# Patient Record
Sex: FEMALE | Race: WHITE | Employment: UNEMPLOYED | URBAN - METROPOLITAN AREA
[De-identification: names, ages, dates, MRNs, and addresses within clinical notes are randomized per-mention and may not be internally consistent; named-entity substitution may affect disease eponyms.]

---

## 2019-01-14 ENCOUNTER — HOSPITAL ENCOUNTER (INPATIENT)
Facility: CLINIC | Age: 39
LOS: 4 days | Discharge: HOME HEALTH | DRG: 919 | End: 2019-01-18
Attending: SURGERY | Admitting: SURGERY
Payer: COMMERCIAL

## 2019-01-14 ENCOUNTER — TRANSFERRED RECORDS (OUTPATIENT)
Dept: HEALTH INFORMATION MANAGEMENT | Facility: CLINIC | Age: 39
End: 2019-01-14

## 2019-01-14 ENCOUNTER — APPOINTMENT (OUTPATIENT)
Dept: MRI IMAGING | Facility: CLINIC | Age: 39
DRG: 919 | End: 2019-01-14
Attending: SURGERY
Payer: COMMERCIAL

## 2019-01-14 DIAGNOSIS — R52 PAIN: ICD-10-CM

## 2019-01-14 DIAGNOSIS — S36.13XA INJURY OF BILE DUCT, INITIAL ENCOUNTER: Primary | ICD-10-CM

## 2019-01-14 LAB
ABO + RH BLD: NORMAL
ABO + RH BLD: NORMAL
ALBUMIN SERPL-MCNC: 3.3 G/DL (ref 3.4–5)
ALP SERPL-CCNC: 95 U/L (ref 40–150)
ALT SERPL W P-5'-P-CCNC: 84 U/L (ref 0–50)
ANION GAP SERPL CALCULATED.3IONS-SCNC: 8 MMOL/L (ref 3–14)
AST SERPL W P-5'-P-CCNC: 74 U/L (ref 0–45)
BILIRUB SERPL-MCNC: 0.3 MG/DL (ref 0.2–1.3)
BLD GP AB SCN SERPL QL: NORMAL
BLOOD BANK CMNT PATIENT-IMP: NORMAL
BUN SERPL-MCNC: 12 MG/DL (ref 7–30)
CALCIUM SERPL-MCNC: 8.2 MG/DL (ref 8.5–10.1)
CHLORIDE SERPL-SCNC: 104 MMOL/L (ref 94–109)
CO2 SERPL-SCNC: 26 MMOL/L (ref 20–32)
CREAT SERPL-MCNC: 0.64 MG/DL (ref 0.52–1.04)
ERYTHROCYTE [DISTWIDTH] IN BLOOD BY AUTOMATED COUNT: 12.9 % (ref 10–15)
GFR SERPL CREATININE-BSD FRML MDRD: >90 ML/MIN/{1.73_M2}
GLUCOSE SERPL-MCNC: 110 MG/DL (ref 70–99)
HCT VFR BLD AUTO: 38.3 % (ref 35–47)
HGB BLD-MCNC: 12.5 G/DL (ref 11.7–15.7)
INR PPP: 0.94 (ref 0.86–1.14)
MAGNESIUM SERPL-MCNC: 1.8 MG/DL (ref 1.6–2.3)
MCH RBC QN AUTO: 28.7 PG (ref 26.5–33)
MCHC RBC AUTO-ENTMCNC: 32.6 G/DL (ref 31.5–36.5)
MCV RBC AUTO: 88 FL (ref 78–100)
PHOSPHATE SERPL-MCNC: 3.8 MG/DL (ref 2.5–4.5)
PLATELET # BLD AUTO: 261 10E9/L (ref 150–450)
POTASSIUM SERPL-SCNC: 3.9 MMOL/L (ref 3.4–5.3)
PROT SERPL-MCNC: 7.3 G/DL (ref 6.8–8.8)
RBC # BLD AUTO: 4.35 10E12/L (ref 3.8–5.2)
SODIUM SERPL-SCNC: 138 MMOL/L (ref 133–144)
SPECIMEN EXP DATE BLD: NORMAL
WBC # BLD AUTO: 14.3 10E9/L (ref 4–11)

## 2019-01-14 PROCEDURE — 25000128 H RX IP 250 OP 636: Performed by: SURGERY

## 2019-01-14 PROCEDURE — 85610 PROTHROMBIN TIME: CPT | Performed by: STUDENT IN AN ORGANIZED HEALTH CARE EDUCATION/TRAINING PROGRAM

## 2019-01-14 PROCEDURE — 83735 ASSAY OF MAGNESIUM: CPT | Performed by: STUDENT IN AN ORGANIZED HEALTH CARE EDUCATION/TRAINING PROGRAM

## 2019-01-14 PROCEDURE — 86901 BLOOD TYPING SEROLOGIC RH(D): CPT | Performed by: STUDENT IN AN ORGANIZED HEALTH CARE EDUCATION/TRAINING PROGRAM

## 2019-01-14 PROCEDURE — 25500064 ZZH RX 255 OP 636: Performed by: SURGERY

## 2019-01-14 PROCEDURE — 80053 COMPREHEN METABOLIC PANEL: CPT | Performed by: STUDENT IN AN ORGANIZED HEALTH CARE EDUCATION/TRAINING PROGRAM

## 2019-01-14 PROCEDURE — 85027 COMPLETE CBC AUTOMATED: CPT | Performed by: STUDENT IN AN ORGANIZED HEALTH CARE EDUCATION/TRAINING PROGRAM

## 2019-01-14 PROCEDURE — 86850 RBC ANTIBODY SCREEN: CPT | Performed by: STUDENT IN AN ORGANIZED HEALTH CARE EDUCATION/TRAINING PROGRAM

## 2019-01-14 PROCEDURE — 74183 MRI ABD W/O CNTR FLWD CNTR: CPT

## 2019-01-14 PROCEDURE — 84100 ASSAY OF PHOSPHORUS: CPT | Performed by: STUDENT IN AN ORGANIZED HEALTH CARE EDUCATION/TRAINING PROGRAM

## 2019-01-14 PROCEDURE — 25000132 ZZH RX MED GY IP 250 OP 250 PS 637: Performed by: SURGERY

## 2019-01-14 PROCEDURE — 36415 COLL VENOUS BLD VENIPUNCTURE: CPT | Performed by: STUDENT IN AN ORGANIZED HEALTH CARE EDUCATION/TRAINING PROGRAM

## 2019-01-14 PROCEDURE — 12000001 ZZH R&B MED SURG/OB UMMC

## 2019-01-14 PROCEDURE — 25000128 H RX IP 250 OP 636: Performed by: STUDENT IN AN ORGANIZED HEALTH CARE EDUCATION/TRAINING PROGRAM

## 2019-01-14 PROCEDURE — 86900 BLOOD TYPING SEROLOGIC ABO: CPT | Performed by: STUDENT IN AN ORGANIZED HEALTH CARE EDUCATION/TRAINING PROGRAM

## 2019-01-14 PROCEDURE — A9581 GADOXETATE DISODIUM INJ: HCPCS | Performed by: SURGERY

## 2019-01-14 RX ORDER — HYDROMORPHONE HYDROCHLORIDE 1 MG/ML
.3-.5 INJECTION, SOLUTION INTRAMUSCULAR; INTRAVENOUS; SUBCUTANEOUS
Status: DISCONTINUED | OUTPATIENT
Start: 2019-01-14 | End: 2019-01-18 | Stop reason: HOSPADM

## 2019-01-14 RX ORDER — NALOXONE HYDROCHLORIDE 0.4 MG/ML
.1-.4 INJECTION, SOLUTION INTRAMUSCULAR; INTRAVENOUS; SUBCUTANEOUS
Status: DISCONTINUED | OUTPATIENT
Start: 2019-01-14 | End: 2019-01-18 | Stop reason: HOSPADM

## 2019-01-14 RX ORDER — ONDANSETRON 2 MG/ML
4 INJECTION INTRAMUSCULAR; INTRAVENOUS EVERY 6 HOURS PRN
Status: DISCONTINUED | OUTPATIENT
Start: 2019-01-14 | End: 2019-01-18 | Stop reason: HOSPADM

## 2019-01-14 RX ORDER — SODIUM CHLORIDE, SODIUM LACTATE, POTASSIUM CHLORIDE, CALCIUM CHLORIDE 600; 310; 30; 20 MG/100ML; MG/100ML; MG/100ML; MG/100ML
1000 INJECTION, SOLUTION INTRAVENOUS CONTINUOUS
Status: DISCONTINUED | OUTPATIENT
Start: 2019-01-14 | End: 2019-01-17

## 2019-01-14 RX ORDER — ACETAMINOPHEN 325 MG/1
650 TABLET ORAL EVERY 4 HOURS PRN
Status: DISCONTINUED | OUTPATIENT
Start: 2019-01-14 | End: 2019-01-18 | Stop reason: HOSPADM

## 2019-01-14 RX ORDER — ONDANSETRON 4 MG/1
4 TABLET, ORALLY DISINTEGRATING ORAL EVERY 6 HOURS PRN
Status: DISCONTINUED | OUTPATIENT
Start: 2019-01-14 | End: 2019-01-18 | Stop reason: HOSPADM

## 2019-01-14 RX ORDER — LIDOCAINE 40 MG/G
CREAM TOPICAL
Status: DISCONTINUED | OUTPATIENT
Start: 2019-01-14 | End: 2019-01-18 | Stop reason: HOSPADM

## 2019-01-14 RX ADMIN — SODIUM CHLORIDE 100 ML: 9 INJECTION, SOLUTION INTRAVENOUS at 21:06

## 2019-01-14 RX ADMIN — HYDROMORPHONE HYDROCHLORIDE 0.3 MG: 1 INJECTION, SOLUTION INTRAMUSCULAR; INTRAVENOUS; SUBCUTANEOUS at 17:44

## 2019-01-14 RX ADMIN — ACETAMINOPHEN 650 MG: 325 TABLET, FILM COATED ORAL at 21:06

## 2019-01-14 RX ADMIN — SODIUM CHLORIDE, POTASSIUM CHLORIDE, SODIUM LACTATE AND CALCIUM CHLORIDE 1000 ML: 600; 310; 30; 20 INJECTION, SOLUTION INTRAVENOUS at 17:44

## 2019-01-14 RX ADMIN — HYDROMORPHONE HYDROCHLORIDE 0.5 MG: 1 INJECTION, SOLUTION INTRAMUSCULAR; INTRAVENOUS; SUBCUTANEOUS at 21:06

## 2019-01-14 RX ADMIN — GADOXETATE DISODIUM 10 ML: 181.43 INJECTION, SOLUTION INTRAVENOUS at 21:07

## 2019-01-14 ASSESSMENT — ACTIVITIES OF DAILY LIVING (ADL): ADLS_ACUITY_SCORE: 12

## 2019-01-14 NOTE — H&P
Surgical Oncology Admission History and Physical    Bryanna Taylor MRN# [id number]     Date of Admission: 2019    CC: concern for bile duct injury    HPI: 37 yo F with h/o abd pain x 6 years who underwent lap sheryl today at OSH, complicated by suspected bile duct injury.  Operative note describes significant inflammation and adherence to common vs right hepatic artery.  Concern for injury prompted cholangiogram, which showed no opacification of biliary tree.  Drain placed, gallbladder removed, transferred.    Past Medical History:    Symptomatic cholelithiasis  Cholestasis during pregnancy  Gestational diabetes  Obesity BMI 41  Hx H pylori     Past Surgical History:   section x 4    Allergies:  No known drug allergies    Medications:  Zolpidem 5 mg at bedtime prn  Ibuprofen  Iron    Social History:  Non-smoker  No etoh or drug use    Family History:  No bleeding/clotting disorders or difficulties with anesthesia    ROS:  10 point ROS reviewed and negative except as above.    Exam:  /89 (BP Location: Right arm)   Pulse 99   Temp 97.2  F (36.2  C) (Oral)   Resp 16   Wt 96.3 kg (212 lb 3.2 oz)   SpO2 100%   No acute distress, interactive  Nlb, on room air  Regular rate, regular rhythm peripheral pulses 2+  Obese; RUQ drain with bile tinged serosanguinous fluid; incisions c/d/i covered with dermabond  Extremities warm, well perfused  Alert, oriented    Labs: pending    Assessment: 38 year old female with suspected bile duct injury s/p lap sheryl    Plan:   -npo, ivf, pain control; ok for ice chips  -mrcp  -gi consult for ercp  -further interventions pending imaging findings     Discussed with Dr. Morillo.    Keyshawn Redmond MD  [email or web address]  [id number]    I saw this patient and reviewed pertinent history, labs and imaging. I spoke to outside surgical team. Has bile duct injury of uncertain extent. MRCP done last night is not revealing, although does show significant leak. GI consulted for ERCP to further assess  injury and consider stent placement. I explained to patient that some minor injuries can be treated with biliary stent and drainage, while more serious injuries require PTC and future reconstructive surgery. We will discuss further once we have better understanding of the exact nature of her injury. I spent 40 minutes on this case, 20 minutes face to face and 20 minutes on coordination of care.    Will also ask maternal fetal medicine to see - patient is breast feeding a one month old baby currently.

## 2019-01-14 NOTE — LETTER
[address] [facility]  [address] [address]  Phone: [phone]    January 18, 2019        Bryanna Taylor  [address] [facility] [address]          To whom it may concern:    Ms. Bryanna Taylor was hospitalized from 1/14/2019 to 1/18/2019. She underwent a procedure requiring placement of an abdominal drain. She will require help for her medical care at home. Please excuse her  [employer] from work for 2 weeks to assist her with these needs. Thank you for your time and help.        Please contact me for questions or concerns.      Sincerely,      Caren Graves MD  Pager: [phone]

## 2019-01-15 ENCOUNTER — ANESTHESIA (OUTPATIENT)
Dept: SURGERY | Facility: CLINIC | Age: 39
DRG: 919 | End: 2019-01-15
Payer: COMMERCIAL

## 2019-01-15 ENCOUNTER — OFFICE VISIT (OUTPATIENT)
Dept: INTERPRETER SERVICES | Facility: CLINIC | Age: 39
End: 2019-01-15

## 2019-01-15 ENCOUNTER — ANESTHESIA EVENT (OUTPATIENT)
Dept: SURGERY | Facility: CLINIC | Age: 39
DRG: 919 | End: 2019-01-15
Payer: COMMERCIAL

## 2019-01-15 ENCOUNTER — APPOINTMENT (OUTPATIENT)
Dept: GENERAL RADIOLOGY | Facility: CLINIC | Age: 39
DRG: 919 | End: 2019-01-15
Attending: SURGERY
Payer: COMMERCIAL

## 2019-01-15 LAB
ALBUMIN SERPL-MCNC: 2.9 G/DL (ref 3.4–5)
ALP SERPL-CCNC: 87 U/L (ref 40–150)
ALT SERPL W P-5'-P-CCNC: 62 U/L (ref 0–50)
ANION GAP SERPL CALCULATED.3IONS-SCNC: 7 MMOL/L (ref 3–14)
AST SERPL W P-5'-P-CCNC: 31 U/L (ref 0–45)
BILIRUB SERPL-MCNC: 0.6 MG/DL (ref 0.2–1.3)
BUN SERPL-MCNC: 9 MG/DL (ref 7–30)
CALCIUM SERPL-MCNC: 8.2 MG/DL (ref 8.5–10.1)
CHLORIDE SERPL-SCNC: 104 MMOL/L (ref 94–109)
CO2 SERPL-SCNC: 29 MMOL/L (ref 20–32)
CREAT SERPL-MCNC: 0.56 MG/DL (ref 0.52–1.04)
ERCP: NORMAL
ERYTHROCYTE [DISTWIDTH] IN BLOOD BY AUTOMATED COUNT: 13.1 % (ref 10–15)
GFR SERPL CREATININE-BSD FRML MDRD: >90 ML/MIN/{1.73_M2}
GLUCOSE SERPL-MCNC: 101 MG/DL (ref 70–99)
HCT VFR BLD AUTO: 36.1 % (ref 35–47)
HGB BLD-MCNC: 11.8 G/DL (ref 11.7–15.7)
MAGNESIUM SERPL-MCNC: 1.9 MG/DL (ref 1.6–2.3)
MCH RBC QN AUTO: 28.9 PG (ref 26.5–33)
MCHC RBC AUTO-ENTMCNC: 32.7 G/DL (ref 31.5–36.5)
MCV RBC AUTO: 89 FL (ref 78–100)
PHOSPHATE SERPL-MCNC: 3.6 MG/DL (ref 2.5–4.5)
PLATELET # BLD AUTO: 254 10E9/L (ref 150–450)
POTASSIUM SERPL-SCNC: 3.4 MMOL/L (ref 3.4–5.3)
PROT SERPL-MCNC: 6.9 G/DL (ref 6.8–8.8)
RADIOLOGIST FLAGS: ABNORMAL
RBC # BLD AUTO: 4.08 10E12/L (ref 3.8–5.2)
SODIUM SERPL-SCNC: 140 MMOL/L (ref 133–144)
WBC # BLD AUTO: 9.7 10E9/L (ref 4–11)

## 2019-01-15 PROCEDURE — 36000061 ZZH SURGERY LEVEL 3 W FLUORO 1ST 30 MIN - UMMC: Performed by: INTERNAL MEDICINE

## 2019-01-15 PROCEDURE — 85027 COMPLETE CBC AUTOMATED: CPT | Performed by: SURGERY

## 2019-01-15 PROCEDURE — T1013 SIGN LANG/ORAL INTERPRETER: HCPCS | Mod: U3

## 2019-01-15 PROCEDURE — 12000001 ZZH R&B MED SURG/OB UMMC

## 2019-01-15 PROCEDURE — 25000128 H RX IP 250 OP 636: Performed by: ANESTHESIOLOGY

## 2019-01-15 PROCEDURE — 25000132 ZZH RX MED GY IP 250 OP 250 PS 637: Performed by: SURGERY

## 2019-01-15 PROCEDURE — 25000125 ZZHC RX 250: Performed by: INTERNAL MEDICINE

## 2019-01-15 PROCEDURE — 37000009 ZZH ANESTHESIA TECHNICAL FEE, EACH ADDTL 15 MIN: Performed by: INTERNAL MEDICINE

## 2019-01-15 PROCEDURE — 36415 COLL VENOUS BLD VENIPUNCTURE: CPT | Performed by: SURGERY

## 2019-01-15 PROCEDURE — 71000016 ZZH RECOVERY PHASE 1 LEVEL 3 FIRST HR: Performed by: INTERNAL MEDICINE

## 2019-01-15 PROCEDURE — 80053 COMPREHEN METABOLIC PANEL: CPT | Performed by: SURGERY

## 2019-01-15 PROCEDURE — 25000125 ZZHC RX 250: Performed by: NURSE ANESTHETIST, CERTIFIED REGISTERED

## 2019-01-15 PROCEDURE — 83735 ASSAY OF MAGNESIUM: CPT | Performed by: SURGERY

## 2019-01-15 PROCEDURE — 36000059 ZZH SURGERY LEVEL 3 EA 15 ADDTL MIN UMMC: Performed by: INTERNAL MEDICINE

## 2019-01-15 PROCEDURE — 25500064 ZZH RX 255 OP 636: Performed by: INTERNAL MEDICINE

## 2019-01-15 PROCEDURE — 84100 ASSAY OF PHOSPHORUS: CPT | Performed by: SURGERY

## 2019-01-15 PROCEDURE — 40000170 ZZH STATISTIC PRE-PROCEDURE ASSESSMENT II: Performed by: INTERNAL MEDICINE

## 2019-01-15 PROCEDURE — 25000128 H RX IP 250 OP 636: Performed by: STUDENT IN AN ORGANIZED HEALTH CARE EDUCATION/TRAINING PROGRAM

## 2019-01-15 PROCEDURE — C1769 GUIDE WIRE: HCPCS | Performed by: INTERNAL MEDICINE

## 2019-01-15 PROCEDURE — 25000128 H RX IP 250 OP 636: Performed by: NURSE ANESTHETIST, CERTIFIED REGISTERED

## 2019-01-15 PROCEDURE — 0FJB8ZZ INSPECTION OF HEPATOBILIARY DUCT, VIA NATURAL OR ARTIFICIAL OPENING ENDOSCOPIC: ICD-10-PCS | Performed by: INTERNAL MEDICINE

## 2019-01-15 PROCEDURE — 37000008 ZZH ANESTHESIA TECHNICAL FEE, 1ST 30 MIN: Performed by: INTERNAL MEDICINE

## 2019-01-15 PROCEDURE — 40000279 XR SURGERY CARM FLUORO GREATER THAN 5 MIN W STILLS: Mod: TC

## 2019-01-15 PROCEDURE — 27210794 ZZH OR GENERAL SUPPLY STERILE: Performed by: INTERNAL MEDICINE

## 2019-01-15 PROCEDURE — BF101ZZ FLUOROSCOPY OF BILE DUCTS USING LOW OSMOLAR CONTRAST: ICD-10-PCS | Performed by: INTERNAL MEDICINE

## 2019-01-15 PROCEDURE — 25000128 H RX IP 250 OP 636: Performed by: SURGERY

## 2019-01-15 PROCEDURE — 25000566 ZZH SEVOFLURANE, EA 15 MIN: Performed by: INTERNAL MEDICINE

## 2019-01-15 PROCEDURE — C1726 CATH, BAL DIL, NON-VASCULAR: HCPCS | Performed by: INTERNAL MEDICINE

## 2019-01-15 RX ORDER — NALOXONE HYDROCHLORIDE 0.4 MG/ML
.1-.4 INJECTION, SOLUTION INTRAMUSCULAR; INTRAVENOUS; SUBCUTANEOUS
Status: DISCONTINUED | OUTPATIENT
Start: 2019-01-15 | End: 2019-01-16

## 2019-01-15 RX ORDER — NALOXONE HYDROCHLORIDE 0.4 MG/ML
.1-.4 INJECTION, SOLUTION INTRAMUSCULAR; INTRAVENOUS; SUBCUTANEOUS
Status: DISCONTINUED | OUTPATIENT
Start: 2019-01-15 | End: 2019-01-15

## 2019-01-15 RX ORDER — INDOMETHACIN 50 MG/1
SUPPOSITORY RECTAL PRN
Status: DISCONTINUED | OUTPATIENT
Start: 2019-01-15 | End: 2019-01-15 | Stop reason: HOSPADM

## 2019-01-15 RX ORDER — LIDOCAINE 40 MG/G
CREAM TOPICAL
Status: DISCONTINUED | OUTPATIENT
Start: 2019-01-15 | End: 2019-01-15 | Stop reason: HOSPADM

## 2019-01-15 RX ORDER — FENTANYL CITRATE 50 UG/ML
INJECTION, SOLUTION INTRAMUSCULAR; INTRAVENOUS PRN
Status: DISCONTINUED | OUTPATIENT
Start: 2019-01-15 | End: 2019-01-15

## 2019-01-15 RX ORDER — PROPOFOL 10 MG/ML
INJECTION, EMULSION INTRAVENOUS PRN
Status: DISCONTINUED | OUTPATIENT
Start: 2019-01-15 | End: 2019-01-15

## 2019-01-15 RX ORDER — ERTAPENEM 1 G/1
INJECTION, POWDER, LYOPHILIZED, FOR SOLUTION INTRAMUSCULAR; INTRAVENOUS PRN
Status: DISCONTINUED | OUTPATIENT
Start: 2019-01-15 | End: 2019-01-15

## 2019-01-15 RX ORDER — FLUMAZENIL 0.1 MG/ML
0.2 INJECTION, SOLUTION INTRAVENOUS
Status: ACTIVE | OUTPATIENT
Start: 2019-01-15 | End: 2019-01-16

## 2019-01-15 RX ORDER — LIDOCAINE 40 MG/G
CREAM TOPICAL
Status: CANCELLED | OUTPATIENT
Start: 2019-01-15

## 2019-01-15 RX ORDER — ONDANSETRON 2 MG/ML
4 INJECTION INTRAMUSCULAR; INTRAVENOUS EVERY 30 MIN PRN
Status: DISCONTINUED | OUTPATIENT
Start: 2019-01-15 | End: 2019-01-15 | Stop reason: HOSPADM

## 2019-01-15 RX ORDER — DEXAMETHASONE SODIUM PHOSPHATE 4 MG/ML
INJECTION, SOLUTION INTRA-ARTICULAR; INTRALESIONAL; INTRAMUSCULAR; INTRAVENOUS; SOFT TISSUE PRN
Status: DISCONTINUED | OUTPATIENT
Start: 2019-01-15 | End: 2019-01-15

## 2019-01-15 RX ORDER — FENTANYL CITRATE 50 UG/ML
25-50 INJECTION, SOLUTION INTRAMUSCULAR; INTRAVENOUS
Status: DISCONTINUED | OUTPATIENT
Start: 2019-01-15 | End: 2019-01-15 | Stop reason: HOSPADM

## 2019-01-15 RX ORDER — ONDANSETRON 2 MG/ML
INJECTION INTRAMUSCULAR; INTRAVENOUS PRN
Status: DISCONTINUED | OUTPATIENT
Start: 2019-01-15 | End: 2019-01-15

## 2019-01-15 RX ORDER — INDOMETHACIN 50 MG/1
100 SUPPOSITORY RECTAL
Status: CANCELLED | OUTPATIENT
Start: 2019-01-15

## 2019-01-15 RX ORDER — SODIUM CHLORIDE, SODIUM LACTATE, POTASSIUM CHLORIDE, CALCIUM CHLORIDE 600; 310; 30; 20 MG/100ML; MG/100ML; MG/100ML; MG/100ML
INJECTION, SOLUTION INTRAVENOUS CONTINUOUS
Status: DISCONTINUED | OUTPATIENT
Start: 2019-01-15 | End: 2019-01-15 | Stop reason: HOSPADM

## 2019-01-15 RX ORDER — ONDANSETRON 4 MG/1
4 TABLET, ORALLY DISINTEGRATING ORAL EVERY 30 MIN PRN
Status: DISCONTINUED | OUTPATIENT
Start: 2019-01-15 | End: 2019-01-15 | Stop reason: HOSPADM

## 2019-01-15 RX ORDER — HYDRALAZINE HYDROCHLORIDE 20 MG/ML
10 INJECTION INTRAMUSCULAR; INTRAVENOUS EVERY 6 HOURS PRN
Status: DISCONTINUED | OUTPATIENT
Start: 2019-01-15 | End: 2019-01-18 | Stop reason: HOSPADM

## 2019-01-15 RX ORDER — IOPAMIDOL 510 MG/ML
INJECTION, SOLUTION INTRAVASCULAR PRN
Status: DISCONTINUED | OUTPATIENT
Start: 2019-01-15 | End: 2019-01-15 | Stop reason: HOSPADM

## 2019-01-15 RX ORDER — SODIUM CHLORIDE, SODIUM LACTATE, POTASSIUM CHLORIDE, CALCIUM CHLORIDE 600; 310; 30; 20 MG/100ML; MG/100ML; MG/100ML; MG/100ML
INJECTION, SOLUTION INTRAVENOUS CONTINUOUS PRN
Status: DISCONTINUED | OUTPATIENT
Start: 2019-01-15 | End: 2019-01-15

## 2019-01-15 RX ADMIN — SODIUM CHLORIDE, POTASSIUM CHLORIDE, SODIUM LACTATE AND CALCIUM CHLORIDE: 600; 310; 30; 20 INJECTION, SOLUTION INTRAVENOUS at 19:08

## 2019-01-15 RX ADMIN — ACETAMINOPHEN 650 MG: 325 TABLET, FILM COATED ORAL at 08:35

## 2019-01-15 RX ADMIN — MIDAZOLAM 2 MG: 1 INJECTION INTRAMUSCULAR; INTRAVENOUS at 17:03

## 2019-01-15 RX ADMIN — ERTAPENEM SODIUM 1 G: 1 INJECTION, POWDER, LYOPHILIZED, FOR SOLUTION INTRAMUSCULAR; INTRAVENOUS at 17:35

## 2019-01-15 RX ADMIN — ONDANSETRON 4 MG: 2 INJECTION INTRAMUSCULAR; INTRAVENOUS at 17:57

## 2019-01-15 RX ADMIN — ROCURONIUM BROMIDE 50 MG: 10 INJECTION INTRAVENOUS at 17:17

## 2019-01-15 RX ADMIN — ACETAMINOPHEN 650 MG: 325 TABLET, FILM COATED ORAL at 12:34

## 2019-01-15 RX ADMIN — HYDROMORPHONE HYDROCHLORIDE 0.5 MG: 1 INJECTION, SOLUTION INTRAMUSCULAR; INTRAVENOUS; SUBCUTANEOUS at 08:35

## 2019-01-15 RX ADMIN — SUGAMMADEX 200 MG: 100 INJECTION, SOLUTION INTRAVENOUS at 18:00

## 2019-01-15 RX ADMIN — DEXAMETHASONE SODIUM PHOSPHATE 4 MG: 4 INJECTION, SOLUTION INTRA-ARTICULAR; INTRALESIONAL; INTRAMUSCULAR; INTRAVENOUS; SOFT TISSUE at 17:38

## 2019-01-15 RX ADMIN — PROPOFOL 120 MG: 10 INJECTION, EMULSION INTRAVENOUS at 17:17

## 2019-01-15 RX ADMIN — SODIUM CHLORIDE, POTASSIUM CHLORIDE, SODIUM LACTATE AND CALCIUM CHLORIDE: 600; 310; 30; 20 INJECTION, SOLUTION INTRAVENOUS at 17:02

## 2019-01-15 RX ADMIN — FENTANYL CITRATE 50 MCG: 50 INJECTION, SOLUTION INTRAMUSCULAR; INTRAVENOUS at 17:17

## 2019-01-15 RX ADMIN — SODIUM CHLORIDE, POTASSIUM CHLORIDE, SODIUM LACTATE AND CALCIUM CHLORIDE 1000 ML: 600; 310; 30; 20 INJECTION, SOLUTION INTRAVENOUS at 05:07

## 2019-01-15 RX ADMIN — HYDROMORPHONE HYDROCHLORIDE 0.5 MG: 1 INJECTION, SOLUTION INTRAMUSCULAR; INTRAVENOUS; SUBCUTANEOUS at 00:03

## 2019-01-15 ASSESSMENT — ENCOUNTER SYMPTOMS
DYSRHYTHMIAS: 0
SEIZURES: 0

## 2019-01-15 ASSESSMENT — ACTIVITIES OF DAILY LIVING (ADL)
ADLS_ACUITY_SCORE: 12
ADLS_ACUITY_SCORE: 13
ADLS_ACUITY_SCORE: 13
ADLS_ACUITY_SCORE: 12
ADLS_ACUITY_SCORE: 13

## 2019-01-15 ASSESSMENT — PAIN DESCRIPTION - DESCRIPTORS
DESCRIPTORS: ACHING
DESCRIPTORS: ACHING

## 2019-01-15 ASSESSMENT — LIFESTYLE VARIABLES: TOBACCO_USE: 0

## 2019-01-15 NOTE — PLAN OF CARE
Time:3882-0872   Reason for admission: POD #1 lap sheryl at OSH, c/b suspected common bile duct injury. Transferred from OSH to here for care.   VS: VSS on RA. Afebrile.   Activity: Up with assist x1, steady on feet. Calls appropriately.   Neuros: A&Ox4. Neuros intact. Upper sorbian speaking. C/o incisional pain managed with PRN PO Tylenol & PRN IV dilaudid.   Cardiac: WDL. Denies chest pain.   Respiratory: WDL. LS clear. Denies SOB. IS encouraged.   GI/: Voiding without difficulty. No BM on this shift, hypoactive BS, -gas. Denies nausea or vomiting.   Diet: NPO except ice chips and meds.   Skin: x4 lap sites closed with derma bond, no drainage noted, BRUNO. Scopolamine patch behind right ear.   Lines: Left PIV infusing NS at 100 mL/hr. Right LOREN with bile OP.   Plan: GI consult. Keep NPO. Pain management.   Will continue to monitor & follow POC.

## 2019-01-15 NOTE — PLAN OF CARE
VSS. Pain managed with tylenol and IV dilaudid. Went to OR for ERCP at 1400. Pumping and saving right now, per pharmacy okay to feed baby with IV dilaudid due to low dose, but Ilia said to wait for gynecology consult. Lap sites intact with dermabond. LOREN with bilious output. Voiding spontaneously. Indonesian speaking. Continue current POC.

## 2019-01-15 NOTE — PROGRESS NOTES
Admitted with bile duct injury during lap sheryl  MRCP unclear as to site of injury but non-visualization of distal duct on prelim reading  GI to see today for ERCP planning  Depending on site of injury, will plan further treatment options - either biliary stent and drainage, or PTC and future surgical correction in 6-8 weeks.  Discussed with patient in presence of .

## 2019-01-15 NOTE — ANESTHESIA PREPROCEDURE EVALUATION
Anesthesia Pre-Procedure Evaluation    Patient: Bryanna Taylor   MRN:     [id number] Gender:   female   Age:    38 year old :      1980        Preoperative Diagnosis: Bile Leak   Procedure(s):  ENDOSCOPIC RETROGRADE CHOLANGIOPANCREATOGRAM     No past medical history on file.   History reviewed. No pertinent surgical history.       Anesthesia Evaluation     . Pt has had prior anesthetic.     No history of anesthetic complications          ROS/MED HX    ENT/Pulmonary:  - neg pulmonary ROS    (-) tobacco use and asthma   Neurologic:  - neg neurologic ROS    (-) seizures   Cardiovascular:  - neg cardiovascular ROS      (-) arrhythmias and irregular heartbeat/palpitations   METS/Exercise Tolerance:  4 - Raking leaves, gardening   Hematologic:         Musculoskeletal:         GI/Hepatic:     (+) cholecystitis/cholelithiasis (c/b bile duct injury),      (-) GERD   Renal/Genitourinary:  - ROS Renal section negative    (-) renal disease   Endo:         Psychiatric:         Infectious Disease:         Malignancy:         Other:                         PHYSICAL EXAM:   Mental Status/Neuro:    Airway: Facies: Feasible  Mallampati: II  Mouth/Opening: Full  TM distance: > 6 cm  Neck ROM: Full   Respiratory: Auscultation: CTAB     Resp. Rate: Normal     Resp. Effort: Normal      CV: Rhythm: Regular  Heart: Normal Sounds  Pulses: Normal   Comments:      Dental: Normal                  Lab Results   Component Value Date    WBC 9.7 01/15/2019    HGB 11.8 01/15/2019    HCT 36.1 01/15/2019     01/15/2019     01/15/2019    POTASSIUM 3.4 01/15/2019    CHLORIDE 104 01/15/2019    CO2 29 01/15/2019    BUN 9 01/15/2019    CR 0.56 01/15/2019     (H) 01/15/2019    AZEB 8.2 (L) 01/15/2019    PHOS 3.6 01/15/2019    MAG 1.9 01/15/2019    ALBUMIN 2.9 (L) 01/15/2019    PROTTOTAL 6.9 01/15/2019    ALT 62 (H) 01/15/2019    AST 31 01/15/2019    ALKPHOS 87 01/15/2019    BILITOTAL 0.6 01/15/2019    INR 0.94 2019        Preop Vitals  BP Readings from Last 3 Encounters:   01/15/19 145/79    Pulse Readings from Last 3 Encounters:   01/15/19 70      Resp Readings from Last 3 Encounters:   01/15/19 16    SpO2 Readings from Last 3 Encounters:   01/15/19 96%      Temp Readings from Last 1 Encounters:   01/15/19 36.7  C (98  F) (Oral)    Ht Readings from Last 1 Encounters:   No data found for Ht      Wt Readings from Last 1 Encounters:   01/14/19 96.3 kg (212 lb 3.2 oz)    There is no height or weight on file to calculate BMI.     LDA:  Peripheral IV 01/14/19 Left Hand (Active)   Site Assessment WDL 1/15/2019 11:00 AM   Line Status Saline locked 1/15/2019 11:00 AM   Phlebitis Scale 0-->no symptoms 1/15/2019 11:00 AM   Infiltration Scale 0 1/15/2019 11:00 AM   Infiltration Site Treatment Method  None 1/15/2019 11:00 AM   Extravasation? No 1/15/2019 11:00 AM   Number of days: 1       Closed/Suction Drain 1 Right Abdomen Bulb (Active)   Site Description WD 1/15/2019  8:45 AM   Dressing Status Normal: Clean, Dry & Intact 1/15/2019  8:45 AM   Drainage Appearance Bile 1/15/2019 12:00 PM   Status To bulb suction 1/15/2019 12:00 PM   Output (ml) 15 ml 1/15/2019 12:00 PM   Number of days: 1            Assessment:   ASA SCORE: 1       Documentation: H&P complete; Consents complete   Proceeding: Proceed without further delay     Plan:   Anes. Type:  General                          PONV Management:Adult Risk Factors: Female     CONSENT: Direct conversation   Plan and risks discussed with: Patient          Comments for Plan/Consent:  GETA. PIVx1. Standard ASA monitors. IV opioids. PACU postop    Risks and benefits of anesthetic discussed with patient including sore throat, voice hoarseness, injury to vocal cords, throat, mouth, teeth, tongue, and lips from intubation; nausea/vomiting.    Presented opportunity to answer patient and family questions. Questions addressed.                           Lionel Roberts MD

## 2019-01-15 NOTE — PROGRESS NOTES
Brief Consult Note    Patient is a 39 yo approximately 1 month postpartum and breastfeeding admitted from an outside hospital POD#1 s/p laparoscopic cholecystectomy at an outside hospital that was complicated by a suspected bile duct injury.  Patient is undergoing ERCP with general anesthesia and had questions regarding breastfeeding.    Would recommended that the patient pump and dump her breastmilk for 24 hours after undergoing general anesthesia.  Spoke with team regarding postoperative medications - uncertain now but will be dictated based on findings in surgery (antibiotics).  If there are further questions regarding postop medications feel free to page if you have additional concerns.    Heydi Dean MD   OB/GYN PGY4    GYN Pager [phone]  After hours pager [phone]

## 2019-01-15 NOTE — PLAN OF CARE
/81 (BP Location: Right arm)   Pulse 89   Temp 99.2  F (37.3  C) (Oral)   Resp 16   Wt 96.3 kg (212 lb 3.2 oz)   SpO2 99%     Time: 6561-8992.   Reason for admission: POD #0 lap sheryl at OSH, c/b suspected common bile duct injury. Transferred from OSH to here for care.   VS: VSS on RA with O2 sats in high 90s. Afebrile.   Activity: Up with assist x1, steady on feet. Calls appropriately.   Neuros: A&Ox4. Neuros intact. Yoruba speaking. C/o incisional pain managed with PRN PO Tylenol & PRN IV dilaudid.   Cardiac: WDL. HR stable in 80s. Denies chest pain.   Respiratory: WDL. LS clear. Denies SOB. IS encouraged.   GI/: Voiding without difficulty. No BM on this shift, hypoactive BS, -gas. Denies nausea or vomiting.   Diet: NPO except ice chips and meds.   Skin: x4 lap sites closed with derma bond, no drainage noted, BRUNO. Scopolamine patch behind right ear.   Lines: Left PIV infusing NS at 100 mL/hr. Right LOREN with bile OP.   Labs: WBC 14.3, ALT 84, AST 74. MRI completed.   New changes this shift: Admitted from OSH for common bile duct injury. MRI completed on this shift. Admissions completed with .   Plan: GI consult. Keep NPO. Pain management.   Will continue to monitor & follow POC.

## 2019-01-15 NOTE — PROGRESS NOTES
Surgical Oncology Progress Note    Bryanna Taylor  [id number]    No acute overnight events. Afebrile. Doing well on room air. Pain control adequate. No nausea or emesis. Up with assistance. Voiding independently.     Temp:  [97.2  F (36.2  C)-99.2  F (37.3  C)] 97.4  F (36.3  C)  Pulse:  [74-99] 74  Resp:  [14-18] 14  BP: (115-165)/(69-91) 115/69  SpO2:  [96 %-100 %] 96 %    Intake/Output Summary (Last 24 hours) at 1/15/2019 0631  Last data filed at 1/15/2019 0507  Gross per 24 hour   Intake 526.67 ml   Output 1810 ml   Net -1283.33 ml     NAD, resting comfortably  non-labored breathing on room air  soft, appropriately tender, non-distended  incisions CDI without erythema/drainage  LOREN drain with bilious output   warm and well perfused   alert, oriented    Yesterday: WBC 14.3, Tbili 0.3, AST 74, ALT 84, Cr 0.64  Labs pending today    38 year old female POD 1 s/p lap sheryl for symptomatic cholelithiasis complicated by likely bile leak, controlled with LOREN drain. MRCP confirms suspicion for bile leak, potentially of the CBD at the level of cystic duct.     GI consult to consider ERCP   LOREN drain to bulb suction  Follow up AM labs, pending  NPO, maintenance IV fluids  PO and IV pain medications  Ambulate QID, SCDs in bed    Seen with chief who will discuss with staff.    Caren Graves MD  General Surgery PGY-3

## 2019-01-16 ENCOUNTER — ANESTHESIA EVENT (OUTPATIENT)
Dept: SURGERY | Facility: CLINIC | Age: 39
DRG: 919 | End: 2019-01-16
Payer: COMMERCIAL

## 2019-01-16 ENCOUNTER — APPOINTMENT (OUTPATIENT)
Dept: INTERVENTIONAL RADIOLOGY/VASCULAR | Facility: CLINIC | Age: 39
DRG: 919 | End: 2019-01-16
Attending: SURGERY
Payer: COMMERCIAL

## 2019-01-16 ENCOUNTER — OFFICE VISIT (OUTPATIENT)
Dept: INTERPRETER SERVICES | Facility: CLINIC | Age: 39
End: 2019-01-16

## 2019-01-16 ENCOUNTER — ANESTHESIA (OUTPATIENT)
Dept: SURGERY | Facility: CLINIC | Age: 39
DRG: 919 | End: 2019-01-16
Payer: COMMERCIAL

## 2019-01-16 ENCOUNTER — OFFICE VISIT (OUTPATIENT)
Dept: INTERPRETER SERVICES | Facility: CLINIC | Age: 39
End: 2019-01-16
Payer: COMMERCIAL

## 2019-01-16 LAB
GLUCOSE BLDC GLUCOMTR-MCNC: 91 MG/DL (ref 70–99)
HCG SERPL QL: NEGATIVE

## 2019-01-16 PROCEDURE — 25000128 H RX IP 250 OP 636: Performed by: NURSE ANESTHETIST, CERTIFIED REGISTERED

## 2019-01-16 PROCEDURE — 37000008 ZZH ANESTHESIA TECHNICAL FEE, 1ST 30 MIN

## 2019-01-16 PROCEDURE — 25000128 H RX IP 250 OP 636: Performed by: SURGERY

## 2019-01-16 PROCEDURE — C1769 GUIDE WIRE: HCPCS

## 2019-01-16 PROCEDURE — 25500064 ZZH RX 255 OP 636: Performed by: RADIOLOGY

## 2019-01-16 PROCEDURE — 27210905 ZZH KIT CR7

## 2019-01-16 PROCEDURE — 40000170 ZZH STATISTIC PRE-PROCEDURE ASSESSMENT II

## 2019-01-16 PROCEDURE — 36481 INSERTION OF CATHETER VEIN: CPT

## 2019-01-16 PROCEDURE — 84703 CHORIONIC GONADOTROPIN ASSAY: CPT | Performed by: RADIOLOGY

## 2019-01-16 PROCEDURE — T1013 SIGN LANG/ORAL INTERPRETER: HCPCS | Mod: U3

## 2019-01-16 PROCEDURE — 25000128 H RX IP 250 OP 636: Performed by: RADIOLOGY

## 2019-01-16 PROCEDURE — 71000014 ZZH RECOVERY PHASE 1 LEVEL 2 FIRST HR

## 2019-01-16 PROCEDURE — 25000132 ZZH RX MED GY IP 250 OP 250 PS 637: Performed by: SURGERY

## 2019-01-16 PROCEDURE — 25000566 ZZH SEVOFLURANE, EA 15 MIN

## 2019-01-16 PROCEDURE — 47532 INJECTION FOR CHOLANGIOGRAM: CPT

## 2019-01-16 PROCEDURE — 37000009 ZZH ANESTHESIA TECHNICAL FEE, EACH ADDTL 15 MIN

## 2019-01-16 PROCEDURE — 25000132 ZZH RX MED GY IP 250 OP 250 PS 637: Performed by: ANESTHESIOLOGY

## 2019-01-16 PROCEDURE — 27210912 ZZH NEEDLE CR8

## 2019-01-16 PROCEDURE — 25000125 ZZHC RX 250: Performed by: NURSE ANESTHETIST, CERTIFIED REGISTERED

## 2019-01-16 PROCEDURE — 25000128 H RX IP 250 OP 636: Performed by: STUDENT IN AN ORGANIZED HEALTH CARE EDUCATION/TRAINING PROGRAM

## 2019-01-16 PROCEDURE — BF101ZZ FLUOROSCOPY OF BILE DUCTS USING LOW OSMOLAR CONTRAST: ICD-10-PCS | Performed by: RADIOLOGY

## 2019-01-16 PROCEDURE — 99231 SBSQ HOSP IP/OBS SF/LOW 25: CPT | Performed by: PHYSICIAN ASSISTANT

## 2019-01-16 PROCEDURE — 25000128 H RX IP 250 OP 636: Performed by: ANESTHESIOLOGY

## 2019-01-16 PROCEDURE — 27210907 ZZH KIT CR9

## 2019-01-16 PROCEDURE — 36415 COLL VENOUS BLD VENIPUNCTURE: CPT | Performed by: RADIOLOGY

## 2019-01-16 PROCEDURE — 00000146 ZZHCL STATISTIC GLUCOSE BY METER IP

## 2019-01-16 PROCEDURE — 12000001 ZZH R&B MED SURG/OB UMMC

## 2019-01-16 PROCEDURE — 27210732 ZZH ACCESSORY CR1

## 2019-01-16 RX ORDER — OXYCODONE HYDROCHLORIDE 5 MG/1
5 TABLET ORAL EVERY 4 HOURS PRN
Status: DISCONTINUED | OUTPATIENT
Start: 2019-01-16 | End: 2019-01-17

## 2019-01-16 RX ORDER — SODIUM CHLORIDE, SODIUM LACTATE, POTASSIUM CHLORIDE, CALCIUM CHLORIDE 600; 310; 30; 20 MG/100ML; MG/100ML; MG/100ML; MG/100ML
INJECTION, SOLUTION INTRAVENOUS CONTINUOUS
Status: DISCONTINUED | OUTPATIENT
Start: 2019-01-16 | End: 2019-01-16 | Stop reason: HOSPADM

## 2019-01-16 RX ORDER — GABAPENTIN 300 MG/1
300 CAPSULE ORAL ONCE
Status: COMPLETED | OUTPATIENT
Start: 2019-01-16 | End: 2019-01-16

## 2019-01-16 RX ORDER — ACETAMINOPHEN 325 MG/1
975 TABLET ORAL ONCE
Status: COMPLETED | OUTPATIENT
Start: 2019-01-16 | End: 2019-01-16

## 2019-01-16 RX ORDER — MEPERIDINE HYDROCHLORIDE 25 MG/ML
12.5 INJECTION INTRAMUSCULAR; INTRAVENOUS; SUBCUTANEOUS EVERY 5 MIN PRN
Status: DISCONTINUED | OUTPATIENT
Start: 2019-01-16 | End: 2019-01-16 | Stop reason: HOSPADM

## 2019-01-16 RX ORDER — NICOTINE POLACRILEX 4 MG
15-30 LOZENGE BUCCAL
Status: DISCONTINUED | OUTPATIENT
Start: 2019-01-16 | End: 2019-01-16 | Stop reason: HOSPADM

## 2019-01-16 RX ORDER — NICOTINE POLACRILEX 4 MG
15-30 LOZENGE BUCCAL
Status: DISCONTINUED | OUTPATIENT
Start: 2019-01-16 | End: 2019-01-18 | Stop reason: HOSPADM

## 2019-01-16 RX ORDER — DEXAMETHASONE SODIUM PHOSPHATE 4 MG/ML
INJECTION, SOLUTION INTRA-ARTICULAR; INTRALESIONAL; INTRAMUSCULAR; INTRAVENOUS; SOFT TISSUE PRN
Status: DISCONTINUED | OUTPATIENT
Start: 2019-01-16 | End: 2019-01-16

## 2019-01-16 RX ORDER — FENTANYL CITRATE 50 UG/ML
25-50 INJECTION, SOLUTION INTRAMUSCULAR; INTRAVENOUS
Status: DISCONTINUED | OUTPATIENT
Start: 2019-01-16 | End: 2019-01-16 | Stop reason: HOSPADM

## 2019-01-16 RX ORDER — LIDOCAINE HYDROCHLORIDE 20 MG/ML
INJECTION, SOLUTION INFILTRATION; PERINEURAL PRN
Status: DISCONTINUED | OUTPATIENT
Start: 2019-01-16 | End: 2019-01-16

## 2019-01-16 RX ORDER — DEXTROSE MONOHYDRATE 25 G/50ML
25-50 INJECTION, SOLUTION INTRAVENOUS
Status: DISCONTINUED | OUTPATIENT
Start: 2019-01-16 | End: 2019-01-16 | Stop reason: HOSPADM

## 2019-01-16 RX ORDER — HYDROMORPHONE HYDROCHLORIDE 1 MG/ML
.3-.5 INJECTION, SOLUTION INTRAMUSCULAR; INTRAVENOUS; SUBCUTANEOUS EVERY 5 MIN PRN
Status: DISCONTINUED | OUTPATIENT
Start: 2019-01-16 | End: 2019-01-16 | Stop reason: HOSPADM

## 2019-01-16 RX ORDER — IODIXANOL 320 MG/ML
100 INJECTION, SOLUTION INTRAVASCULAR ONCE
Status: COMPLETED | OUTPATIENT
Start: 2019-01-16 | End: 2019-01-16

## 2019-01-16 RX ORDER — NALOXONE HYDROCHLORIDE 0.4 MG/ML
.1-.4 INJECTION, SOLUTION INTRAMUSCULAR; INTRAVENOUS; SUBCUTANEOUS
Status: DISCONTINUED | OUTPATIENT
Start: 2019-01-16 | End: 2019-01-16

## 2019-01-16 RX ORDER — ONDANSETRON 4 MG/1
4 TABLET, ORALLY DISINTEGRATING ORAL EVERY 30 MIN PRN
Status: DISCONTINUED | OUTPATIENT
Start: 2019-01-16 | End: 2019-01-16 | Stop reason: HOSPADM

## 2019-01-16 RX ORDER — ESMOLOL HYDROCHLORIDE 10 MG/ML
INJECTION INTRAVENOUS PRN
Status: DISCONTINUED | OUTPATIENT
Start: 2019-01-16 | End: 2019-01-16

## 2019-01-16 RX ORDER — ONDANSETRON 2 MG/ML
4 INJECTION INTRAMUSCULAR; INTRAVENOUS EVERY 30 MIN PRN
Status: DISCONTINUED | OUTPATIENT
Start: 2019-01-16 | End: 2019-01-16 | Stop reason: HOSPADM

## 2019-01-16 RX ORDER — AMPICILLIN AND SULBACTAM 2; 1 G/1; G/1
3 INJECTION, POWDER, FOR SOLUTION INTRAMUSCULAR; INTRAVENOUS
Status: COMPLETED | OUTPATIENT
Start: 2019-01-16 | End: 2019-01-16

## 2019-01-16 RX ORDER — PROPOFOL 10 MG/ML
INJECTION, EMULSION INTRAVENOUS PRN
Status: DISCONTINUED | OUTPATIENT
Start: 2019-01-16 | End: 2019-01-16

## 2019-01-16 RX ORDER — DEXTROSE MONOHYDRATE 25 G/50ML
25-50 INJECTION, SOLUTION INTRAVENOUS
Status: DISCONTINUED | OUTPATIENT
Start: 2019-01-16 | End: 2019-01-18 | Stop reason: HOSPADM

## 2019-01-16 RX ORDER — LABETALOL HYDROCHLORIDE 5 MG/ML
10 INJECTION, SOLUTION INTRAVENOUS
Status: DISCONTINUED | OUTPATIENT
Start: 2019-01-16 | End: 2019-01-16 | Stop reason: HOSPADM

## 2019-01-16 RX ORDER — FENTANYL CITRATE 50 UG/ML
INJECTION, SOLUTION INTRAMUSCULAR; INTRAVENOUS PRN
Status: DISCONTINUED | OUTPATIENT
Start: 2019-01-16 | End: 2019-01-16

## 2019-01-16 RX ADMIN — LIDOCAINE HYDROCHLORIDE 100 MG: 20 INJECTION, SOLUTION INFILTRATION; PERINEURAL at 14:43

## 2019-01-16 RX ADMIN — PROPOFOL 200 MG: 10 INJECTION, EMULSION INTRAVENOUS at 14:45

## 2019-01-16 RX ADMIN — SODIUM CHLORIDE, POTASSIUM CHLORIDE, SODIUM LACTATE AND CALCIUM CHLORIDE: 600; 310; 30; 20 INJECTION, SOLUTION INTRAVENOUS at 14:26

## 2019-01-16 RX ADMIN — FENTANYL CITRATE 50 MCG: 50 INJECTION, SOLUTION INTRAMUSCULAR; INTRAVENOUS at 17:55

## 2019-01-16 RX ADMIN — ACETAMINOPHEN 650 MG: 325 TABLET, FILM COATED ORAL at 08:55

## 2019-01-16 RX ADMIN — HYDROMORPHONE HYDROCHLORIDE 0.3 MG: 1 INJECTION, SOLUTION INTRAMUSCULAR; INTRAVENOUS; SUBCUTANEOUS at 05:33

## 2019-01-16 RX ADMIN — IODIXANOL 60 ML: 320 INJECTION, SOLUTION INTRAVASCULAR at 17:48

## 2019-01-16 RX ADMIN — SODIUM CHLORIDE, POTASSIUM CHLORIDE, SODIUM LACTATE AND CALCIUM CHLORIDE: 600; 310; 30; 20 INJECTION, SOLUTION INTRAVENOUS at 17:45

## 2019-01-16 RX ADMIN — GABAPENTIN 300 MG: 300 CAPSULE ORAL at 14:18

## 2019-01-16 RX ADMIN — FENTANYL CITRATE 50 MCG: 50 INJECTION, SOLUTION INTRAMUSCULAR; INTRAVENOUS at 15:50

## 2019-01-16 RX ADMIN — ONDANSETRON 4 MG: 2 INJECTION INTRAMUSCULAR; INTRAVENOUS at 15:03

## 2019-01-16 RX ADMIN — FENTANYL CITRATE 50 MCG: 50 INJECTION, SOLUTION INTRAMUSCULAR; INTRAVENOUS at 15:14

## 2019-01-16 RX ADMIN — FENTANYL CITRATE 50 MCG: 50 INJECTION, SOLUTION INTRAMUSCULAR; INTRAVENOUS at 17:29

## 2019-01-16 RX ADMIN — SODIUM CHLORIDE, POTASSIUM CHLORIDE, SODIUM LACTATE AND CALCIUM CHLORIDE 1000 ML: 600; 310; 30; 20 INJECTION, SOLUTION INTRAVENOUS at 05:31

## 2019-01-16 RX ADMIN — AMPICILLIN SODIUM AND SULBACTAM SODIUM 3 G: 2; 1 INJECTION, POWDER, FOR SOLUTION INTRAMUSCULAR; INTRAVENOUS at 14:37

## 2019-01-16 RX ADMIN — FENTANYL CITRATE 50 MCG: 50 INJECTION, SOLUTION INTRAMUSCULAR; INTRAVENOUS at 16:43

## 2019-01-16 RX ADMIN — ROCURONIUM BROMIDE 50 MG: 10 INJECTION INTRAVENOUS at 14:45

## 2019-01-16 RX ADMIN — ACETAMINOPHEN 975 MG: 325 TABLET, FILM COATED ORAL at 14:19

## 2019-01-16 RX ADMIN — FENTANYL CITRATE 50 MCG: 50 INJECTION, SOLUTION INTRAMUSCULAR; INTRAVENOUS at 14:43

## 2019-01-16 RX ADMIN — ESMOLOL HYDROCHLORIDE 30 MG: 10 INJECTION, SOLUTION INTRAVENOUS at 17:54

## 2019-01-16 RX ADMIN — DEXAMETHASONE SODIUM PHOSPHATE 6 MG: 4 INJECTION, SOLUTION INTRA-ARTICULAR; INTRALESIONAL; INTRAMUSCULAR; INTRAVENOUS; SOFT TISSUE at 15:03

## 2019-01-16 RX ADMIN — SUGAMMADEX 200 MG: 100 INJECTION, SOLUTION INTRAVENOUS at 17:43

## 2019-01-16 ASSESSMENT — ENCOUNTER SYMPTOMS
DYSRHYTHMIAS: 0
SEIZURES: 0

## 2019-01-16 ASSESSMENT — ACTIVITIES OF DAILY LIVING (ADL)
ADLS_ACUITY_SCORE: 13
ADLS_ACUITY_SCORE: 12

## 2019-01-16 ASSESSMENT — LIFESTYLE VARIABLES: TOBACCO_USE: 0

## 2019-01-16 ASSESSMENT — PAIN DESCRIPTION - DESCRIPTORS: DESCRIPTORS: ACHING

## 2019-01-16 NOTE — PLAN OF CARE
[id number] Pt arrived from PACU at 2005. Healing abdominal incision with dermabond. Denies pain and nausea. LOREN with bilious output. PIV infusing LR at 100 ml/hr. Voids spontaneously, has not voided since arrival to floor from surgery. On clear liquid diet, NPO at 0000 for possible IR procedure tomorrow.  Up with assist x1. VSS on 2 L O2 NC. Lithuanian speaking, bedside  used after arrival to .

## 2019-01-16 NOTE — PROGRESS NOTES
Documentation of Face to Face and Certification for Home Health Services    I certify that patient: Bryanna Taylor is under my care and that I, or a nurse practitioner or physician's assistant working with me, had a face-to-face encounter that meets the physician face-to-face encounter requirements with this patient on: January 16, 2019.    This encounter with the patient was in whole, or in part, for the following medical condition, which is the primary reason for home health care: Bile duct injury during a Lap Choley surgical procedure at [facility] resulting in a transfer to the [facility] for repair.    I certify that, based on my findings, the following services are medically necessary home health services: Skilled RN cares in home setting to assist with multiple drain cares including recording of drain outputs and drain cares per MD orders and prescribed lab draws.    My clinical findings support the need for the above services because: patient is overwhelmed with hospitalization in two [facility] and will benefit form ongoing skilled education for prescribed drain placements to assist with healing.      Further, I certify that my clinical findings support that this patient is homebound deconditioned from multiple hospitalizations.     Based on the above findings. I certify that this patient is confined to the home and needs intermittent skilled nursing.      The patient is under my care, and I have initiated the establishment of the plan of care.  This patient will be followed by a physician who will periodically review the plan of care.  Physician/Provider to provide follow up care: No Ref-Primary, Physician/Primary Care MD to be determined prior to discharge from the acute care hospital setting.  The discharging MD team will sign home care orders until primary MD care is established.    Attending hospital physician (the Medicare certified [facility] provider): Dr. Michael Flanagan  MARGOT Morillo.Physician Signature: See electronic signature associated with these discharge orders.    Date: 1/16/2019

## 2019-01-16 NOTE — PROGRESS NOTES
Surgical Oncology Progress Note    Interval History:  No acute events overnight. Afebrile. Pain controlled.     Physical Exam:   Temp:  [96.8  F (36  C)-99.7  F (37.6  C)] 96.8  F (36  C)  Pulse:  [65-80] 73  Heart Rate:  [64-76] 67  Resp:  [14-24] 20  BP: (112-135)/(6-89) 120/68  SpO2:  [95 %-100 %] 97 %  General: Alert, oriented, appears comfortable, NAD.  Respiratory: breathing non labored  Abdomen: Abdomen is soft, non-tender, non-distended. Drain with bilious output.     Data:   All laboratory and imaging data in the past 24 hours reviewed  I/O last 3 completed shifts:  In: 3086.67 [P.O.:280; I.V.:2806.67]  Out: 2165 [Urine:1800; Drains:365]  Recent Labs   Lab Test 01/15/19  0654 01/14/19  1745   WBC 9.7 14.3*   HGB 11.8 12.5    261   INR  --  0.94      Recent Labs   Lab Test 01/15/19  0654 01/14/19  1745    138   POTASSIUM 3.4 3.9   CHLORIDE 104 104   CO2 29 26   BUN 9 12   CR 0.56 0.64   ANIONGAP 7 8   AZEB 8.2* 8.2*   * 110*     Recent Labs   Lab Test 01/15/19  0654   PROTTOTAL 6.9   ALBUMIN 2.9*   BILITOTAL 0.6   ALKPHOS 87   AST 31   ALT 62*     Assessment and Plan:     38 year old female POD 2 s/p lap sheryl for symptomatic cholelithiasis at OSH complicated by likely bile leak, controlled with LOREN drain. Transferred to [facility] 1/14. MRCP 1/14 confirms suspicion for bile leak. ERCP 1/15 demonstrated transected common bile duct with distal end clipped.     -IR PTC today  -LOREN drain to bulb suction  -NPO, maintenance IV fluids  -IV pain medications  -Ambulate QID, SCDs in bed  -pump and dump for 24 hours after general anesthesia.     Seen with Chief resident who will discuss with Staff.     Alycia Patel PA-C   Surgical Oncology

## 2019-01-16 NOTE — OP NOTE
ERCP 01/15/2019  5:16 PM [facility], [address] [place] [address] [phone]     Endoscopy Department   _______________________________________________________________________________   Patient Name: Bryanna Taylor     Procedure Date: 1/15/2019 5:16 PM   MRN: [id number]                       Account Number: [id number]   YOB: 1980              Admit Type: Inpatient   Age: 38                                Gender: Female   Note Status: Finalized                Attending MD: Ryan Costa MD   Total Sedation Time:                     _______________________________________________________________________________       Procedure:           ERCP   Indications:         Bile leak, Treatment of bile leak   Providers:           Ryan Costa MD, Jennifer Vanderheyden   Patient Profile:     Ms Taylor is a 39yo woman with evidence of a bile duct                        injury and or leak on MRCP obtained following complex                        cholecystectomy. With ongiong drain output, she proceeds                        to management by ERCP.   Referring MD:        Michael Morillo MD   Requesting Provider: Nestor Reis MD   Medicines:           General Anesthesia, Invanz 1 g IV   Complications:       No immediate complications.   _______________________________________________________________________________   Procedure:           Pre-Anesthesia Assessment:                        - Prior to the procedure, a History and Physical was                        performed, and patient medications and allergies were                        reviewed. The patient is competent. The risks and                        benefits of the procedure and the sedation options and                        risks were discussed with the patient. All questions                        were answered and informed consent was obtained. Patient                        identification  and proposed procedure were verified by                        the nurse in the pre-procedure area. Mental Status                        Examination: alert and oriented. Airway Examination:                        Mallampati Class II (the uvula but not tonsillar pillars                        visualized). Respiratory Examination: clear to                        auscultation. CV Examination: normal. ASA Grade                        Assessment: II - A patient with mild systemic disease.                        After reviewing the risks and benefits, the patient was                        deemed in satisfactory condition to undergo the                        procedure. The anesthesia plan was to use general                        anesthesia. Immediately prior to administration of                        medications, the patient was re-assessed for adequacy to                        receive sedatives. The heart rate, respiratory rate,                        oxygen saturations, blood pressure, adequacy of                        pulmonary ventilation, and response to care were                        monitored throughout the procedure. The physical status                        of the patient was re-assessed after the procedure.                        After obtaining informed consent, the scope was passed                        under direct vision. Throughout the procedure, the                        patient's blood pressure, pulse, and oxygen saturations                        were monitored continuously. The duodenoscope was                        introduced through the mouth, and used to inject                        contrast into and used to inject contrast into the bile                        duct. The ERCP was accomplished without difficulty. The                        patient tolerated the procedure well.                                                                                     Findings:        A  film  "demonstrated numerous surgical clips as well as the biliary        drain. Limited white light views of the foregut were unremarkable        including those of the ampulla. Initial shallow cannulation and gentle        contrast infusion with the sphincterotome began to fill pancreatic duct        in the head and was discontinued. With this orientation in mind the bile        duct was deeply cannulated with the short-nosed traction sphincterotome        in concert with an 0.025\" Visiglide wire. Contrast was injected and I        personally interpreted the bile duct images. Ductal flow of contrast was        adequate, image quality was adequate and contrast extended only to the        common duct at the level of surgical clips. A 4 mm biliary        sphincterotomy was made with a monofilament traction (standard)        sphincterotome using ERBE electrocautery. There was no        post-sphincterotomy bleeding. The bile duct was deeply cannulated with        the 12 mm balloon over the wire and contrast was injected under        pressure. Again no contrast was found either extravsating or reaching        the upstream biliary system.                                                                                     Impression:          - Uncomplicated biliary sphincterotomy                        - Complete common duct obstruction by surgical clips                        perhaps following upstream intraoperative transection   Recommendation:      - Standard inpatient general anesthesia recovery with                        return to the floor when appropriate                        - Case reviewed with our pancreaticobiliary surgeron Dr Morillo; IR consultation for consideration of                        percutaneous transhepatic internal external drain                        - Avoid antithrombotics for at least three days                        - The findings and recommendations were discussed with "                        the patient and their family                                                                                       electronically signed by SUSHMA Costa

## 2019-01-16 NOTE — PROGRESS NOTES
1/16/2019 Gastroenterology Brief Note    Chart reviewed  ERCP showing transected CBD with distal clip therefore stent could not be placed  Patient not seen, down in IR getting PTC  Per notes doing well    Discussed with primary team  GI will follow peripherally, call with questions    Above in collaboration with Dr. Liu Cuellar PA-C  Advanced Endoscopy/Pancreaticobiliary Service  Pager *[phone]

## 2019-01-16 NOTE — PLAN OF CARE
VSS. Pain managed with tylenol. Went to OR for drain placement at 1200. Can breastfeed baby 24 hours after anesthesia. Lap sites intact with dermabond. LOREN with bilious output. Voiding spontaneously. Israeli speaking. In OR as of 1530. Continue current POC.

## 2019-01-16 NOTE — PLAN OF CARE
A&O. VSS on 2L. Denied pain until 0530 and then controlled with PRN dilaudid. Denies nausea. NPO at 0000 for possible IR procedure in AM. LOREN with moderate output and stripped. Good urine output overnight. PIV infusing LR at 100ml/hr. Japanese speaking with  apt in AM. Up with assist X1. Continue with plan of care.

## 2019-01-16 NOTE — ANESTHESIA PREPROCEDURE EVALUATION
Anesthesia Pre-Procedure Evaluation    Patient: Bryanna Taylor   MRN:     [id number] Gender:   female   Age:    38 year old :      1980        Preoperative Diagnosis: Bile Leak   Procedure(s):  ENDOSCOPIC RETROGRADE CHOLANGIOPANCREATOGRAM     No past medical history on file.   Past Surgical History:   Procedure Laterality Date     ENDOSCOPIC RETROGRADE CHOLANGIOPANCREATOGRAM N/A 1/15/2019    Procedure: ENDOSCOPIC RETROGRADE CHOLANGIOPANCREATOGRAM, biliary sphincterotomy;  Surgeon: Ryan Costa MD;  Location: [facility] OR          Anesthesia Evaluation     . Pt has had prior anesthetic.     No history of anesthetic complications          ROS/MED HX    ENT/Pulmonary:  - neg pulmonary ROS    (-) tobacco use and asthma   Neurologic:  - neg neurologic ROS    (-) seizures   Cardiovascular:  - neg cardiovascular ROS      (-) arrhythmias and irregular heartbeat/palpitations   METS/Exercise Tolerance:  4 - Raking leaves, gardening   Hematologic:         Musculoskeletal:         GI/Hepatic:     (+) cholecystitis/cholelithiasis (c/b bile duct injury),      (-) GERD   Renal/Genitourinary:  - ROS Renal section negative    (-) renal disease   Endo:         Psychiatric:         Infectious Disease:         Malignancy:         Other:                         PHYSICAL EXAM:   Mental Status/Neuro: A/A/O   Airway: Facies: Feasible  Mallampati: II  Mouth/Opening: Full  TM distance: > 6 cm  Neck ROM: Full   Respiratory: Auscultation: CTAB     Resp. Rate: Normal     Resp. Effort: Normal      CV: Rhythm: Regular  Heart: Normal Sounds  Pulses: Normal   Comments:      Dental: Normal                  Lab Results   Component Value Date    WBC 9.7 01/15/2019    HGB 11.8 01/15/2019    HCT 36.1 01/15/2019     01/15/2019     01/15/2019    POTASSIUM 3.4 01/15/2019    CHLORIDE 104 01/15/2019    CO2 29 01/15/2019    BUN 9 01/15/2019    CR 0.56 01/15/2019     (H) 01/15/2019    AZEB 8.2 (L) 01/15/2019    PHOS 3.6  01/15/2019    MAG 1.9 01/15/2019    ALBUMIN 2.9 (L) 01/15/2019    PROTTOTAL 6.9 01/15/2019    ALT 62 (H) 01/15/2019    AST 31 01/15/2019    ALKPHOS 87 01/15/2019    BILITOTAL 0.6 01/15/2019    INR 0.94 01/14/2019       Preop Vitals  BP Readings from Last 3 Encounters:   01/16/19 120/68    Pulse Readings from Last 3 Encounters:   01/15/19 73      Resp Readings from Last 3 Encounters:   01/16/19 20    SpO2 Readings from Last 3 Encounters:   01/16/19 97%      Temp Readings from Last 1 Encounters:   01/16/19 36  C (96.8  F) (Oral)    Ht Readings from Last 1 Encounters:   No data found for Ht      Wt Readings from Last 1 Encounters:   01/14/19 96.3 kg (212 lb 3.2 oz)    There is no height or weight on file to calculate BMI.     LDA:  Peripheral IV 01/14/19 Left Hand (Active)   Site Assessment WDL 1/16/2019 12:00 AM   Line Status Infusing 1/16/2019 12:00 AM   Phlebitis Scale 0-->no symptoms 1/16/2019 12:00 AM   Infiltration Scale 0 1/16/2019 12:00 AM   Infiltration Site Treatment Method  None 1/15/2019 11:00 AM   Extravasation? No 1/16/2019 12:00 AM   Number of days: 2       Closed/Suction Drain 1 Right Abdomen Bulb (Active)   Site Description UTV 1/16/2019  1:00 AM   Dressing Status Normal: Clean, Dry & Intact 1/16/2019  1:00 AM   Dressing Change Due 01/15/19 1/15/2019  8:20 PM   Drainage Appearance Bile 1/16/2019  5:40 AM   Status To bulb suction 1/16/2019  5:40 AM   Output (ml) 60 ml 1/16/2019  5:40 AM   Number of days: 2            Assessment:   ASA SCORE: 1       Documentation: H&P complete; Consents complete   Proceeding: Proceed without further delay  Tobacco Use:  NO Active use of Tobacco/UNKNOWN Tobacco use status     Plan:   Anes. Type:  General   Pre-Induction: Midazolam IV   Induction:  IV (Standard)   Airway: Oral ETT   Access/Monitoring: PIV   Maintenance: Balanced   Emergence: Recovery Site (PACU/ICU)   Logistics: Observation/Admission     PONV Management:  Adult Risk Factors: Female,  Non-Smoker  Prevention: Ondansetron; Dexamethasone     CONSENT: Direct conversation   Plan and risks discussed with: Patient          Comments for Plan/Consent:  GETA. PIVx1. Standard ASA monitors. IV opioids. PACU postop    Risks and benefits of anesthetic discussed with patient including sore throat, voice hoarseness, injury to vocal cords, throat, mouth, teeth, tongue, and lips from intubation; nausea/vomiting.    Presented opportunity to answer patient and family questions. Questions addressed.      MD Jeremiah Qureshi MD

## 2019-01-16 NOTE — ANESTHESIA CARE TRANSFER NOTE
Patient: Bryanna Taylor    Procedure(s):  ENDOSCOPIC RETROGRADE CHOLANGIOPANCREATOGRAM, biliary sphincterotomy    Diagnosis: Bile Leak  Diagnosis Additional Information: No value filed.    Anesthesia Type:   No value filed.     Note:  Airway :Nasal Cannula  Patient transferred to:PACU  Comments: Pt to recovery room.  Spontaneous respirations.   Report given to RN.  Handoff Report: Identifed the Patient, Identified the Reponsible Provider, Reviewed the pertinent medical history, Discussed the surgical course, Reviewed Intra-OP anesthesia mangement and issues during anesthesia, Set expectations for post-procedure period and Allowed opportunity for questions and acknowledgement of understanding      Vitals: (Last set prior to Anesthesia Care Transfer)    CRNA VITALS  1/15/2019 1736 - 1/15/2019 1811      1/15/2019             Resp Rate (observed):  31  (Abnormal)                 Electronically Signed By: PERLA Haile CRNA  January 15, 2019  6:11 PM

## 2019-01-16 NOTE — OR NURSING
Dr. Nation called and notified patient is ready to transition to inpatient bed. Dr. Nation okay with patient moving up to bed.

## 2019-01-16 NOTE — CONSULTS
"Social Work Services Progress Note    Hospital Day: 2  Date of Initial Social Work Evaluation:  Not completed  Collaborated with:  Pt at bedside, ,  Financial Services, and bedside nurse.    Data:  SW consulted for financial/insurance questions. Pt is 38 year old Bolivian speaking female who was admitted following lap sheryl procedure w/ suspected bile duct injury.      SW met with Pt, professional , and bedside nurse to discuss current insurance. Pt got on [employer] when she became pregnant. Pt had her baby in 2017 and was informed by [facility] that her insurance will term \"2 months\" following the birth of her baby. Pt anticipates she will not have insurance at the end of 2019. Pt continues to have discharge and follow up needs that will likely succeed February due to likely bile duct injury. SW made referral to  Financial Services to assess for insurance options. Per  Counseling, [employer] confirmed Pt's MA will term 2-28-19 and per [employer] policy individuals cannot apply until after the policy has . Pt will be advised to follow up with [facility] office (pt states she has meet with financial counselors off [facility] office) when in the community.     Intervention:  Financial Counseling    Assessment:  Pt was pleasant and open to SW visit. Pt has been in contact with [facility] assistance primarily at the [facility] office.     Plan:    Anticipated Disposition:  Home with services    Barriers to d/c plan:  Medical stability    Follow Up:  SW to f/u & assist PRN.    NETO Muhammad, DIANNE  7C Surgical/Oncology Unit   Phone: [phone]  Pager: [phone]        "

## 2019-01-16 NOTE — ANESTHESIA POSTPROCEDURE EVALUATION
Anesthesia POST Procedure Evaluation    Patient: Bryanna Taylor   MRN:     [id number] Gender:   female   Age:    38 year old :      1980        Preoperative Diagnosis: Bile Leak   Procedure(s):  ENDOSCOPIC RETROGRADE CHOLANGIOPANCREATOGRAM, biliary sphincterotomy   Postop Comments: No value filed.       Anesthesia Type:  General    Reportable Event: NO     PAIN: Uncomplicated   Sign Out status: Comfortable, Well controlled pain     PONV: No PONV   Sign Out status:  No Nausea or Vomiting     Neuro/Psych: Uneventful perioperative course   Sign Out Status: Preoperative baseline; Age appropriate mentation     Airway/Resp.: Uneventful perioperative course   Sign Out Status: Non labored breathing, age appropriate RR; Resp. Status within EXPECTED Parameters     CV: Uneventful perioperative course   Sign Out status: Appropriate BP and perfusion indices; Appropriate HR/Rhythm     Disposition:   Sign Out in:  PACU  Disposition:  Floor  Recovery Course: Uneventful  Follow-Up: Not required           Last Anesthesia Record Vitals:  CRNA VITALS  1/15/2019 1736 - 1/15/2019 1836      1/15/2019             Resp Rate (observed):  12          Last PACU/Preop Vitals:  Vitals:    01/15/19 1820 01/15/19 1830 01/15/19 1900   BP: 124/74 125/67 135/89   Pulse:   80   Resp: 14 16 20   Temp:  37.5  C (99.5  F) 37.6  C (99.7  F)   SpO2: 100% 100% 100%         Electronically Signed By: Yesica Nation MD, January 15, 2019, 7:13 PM

## 2019-01-16 NOTE — PROGRESS NOTES
No acute events overnight  ERCP shows transected CBD with distal clip - therefor no stent able to be placed    I spoke to YUDY Paredes this morning to arrange PTC placement    All discussed with patient with     Plan is for PTC and delayed reconstruction. Will likely delay 6-8 weeks to let inflammation settle down. Also asked socia work to see to discus insurance questions and possible need for home nurse visits to help with drain management, labs, an possible fluids.    Patient voiced clear understanding.

## 2019-01-17 ENCOUNTER — OFFICE VISIT (OUTPATIENT)
Dept: INTERPRETER SERVICES | Facility: CLINIC | Age: 39
End: 2019-01-17
Payer: COMMERCIAL

## 2019-01-17 LAB
ALBUMIN SERPL-MCNC: 2.4 G/DL (ref 3.4–5)
ALP SERPL-CCNC: 108 U/L (ref 40–150)
ALT SERPL W P-5'-P-CCNC: 82 U/L (ref 0–50)
ANION GAP SERPL CALCULATED.3IONS-SCNC: 7 MMOL/L (ref 3–14)
AST SERPL W P-5'-P-CCNC: 52 U/L (ref 0–45)
BILIRUB SERPL-MCNC: 0.9 MG/DL (ref 0.2–1.3)
BUN SERPL-MCNC: 12 MG/DL (ref 7–30)
CALCIUM SERPL-MCNC: 8.1 MG/DL (ref 8.5–10.1)
CHLORIDE SERPL-SCNC: 106 MMOL/L (ref 94–109)
CO2 SERPL-SCNC: 28 MMOL/L (ref 20–32)
CREAT SERPL-MCNC: 0.55 MG/DL (ref 0.52–1.04)
ERYTHROCYTE [DISTWIDTH] IN BLOOD BY AUTOMATED COUNT: 13.1 % (ref 10–15)
GFR SERPL CREATININE-BSD FRML MDRD: >90 ML/MIN/{1.73_M2}
GLUCOSE SERPL-MCNC: 82 MG/DL (ref 70–99)
HCT VFR BLD AUTO: 33.1 % (ref 35–47)
HGB BLD-MCNC: 10.6 G/DL (ref 11.7–15.7)
MAGNESIUM SERPL-MCNC: 1.8 MG/DL (ref 1.6–2.3)
MCH RBC QN AUTO: 28.6 PG (ref 26.5–33)
MCHC RBC AUTO-ENTMCNC: 32 G/DL (ref 31.5–36.5)
MCV RBC AUTO: 90 FL (ref 78–100)
PHOSPHATE SERPL-MCNC: 3.8 MG/DL (ref 2.5–4.5)
PLATELET # BLD AUTO: 259 10E9/L (ref 150–450)
POTASSIUM SERPL-SCNC: 3.3 MMOL/L (ref 3.4–5.3)
PROT SERPL-MCNC: 6.2 G/DL (ref 6.8–8.8)
RBC # BLD AUTO: 3.7 10E12/L (ref 3.8–5.2)
SODIUM SERPL-SCNC: 141 MMOL/L (ref 133–144)
WBC # BLD AUTO: 9.6 10E9/L (ref 4–11)

## 2019-01-17 PROCEDURE — 84100 ASSAY OF PHOSPHORUS: CPT | Performed by: STUDENT IN AN ORGANIZED HEALTH CARE EDUCATION/TRAINING PROGRAM

## 2019-01-17 PROCEDURE — 25000128 H RX IP 250 OP 636: Performed by: SURGERY

## 2019-01-17 PROCEDURE — 36415 COLL VENOUS BLD VENIPUNCTURE: CPT | Performed by: STUDENT IN AN ORGANIZED HEALTH CARE EDUCATION/TRAINING PROGRAM

## 2019-01-17 PROCEDURE — 80053 COMPREHEN METABOLIC PANEL: CPT | Performed by: STUDENT IN AN ORGANIZED HEALTH CARE EDUCATION/TRAINING PROGRAM

## 2019-01-17 PROCEDURE — 25000132 ZZH RX MED GY IP 250 OP 250 PS 637: Performed by: ANESTHESIOLOGY

## 2019-01-17 PROCEDURE — 25000132 ZZH RX MED GY IP 250 OP 250 PS 637: Performed by: SURGERY

## 2019-01-17 PROCEDURE — 83735 ASSAY OF MAGNESIUM: CPT | Performed by: STUDENT IN AN ORGANIZED HEALTH CARE EDUCATION/TRAINING PROGRAM

## 2019-01-17 PROCEDURE — 12000001 ZZH R&B MED SURG/OB UMMC

## 2019-01-17 PROCEDURE — T1013 SIGN LANG/ORAL INTERPRETER: HCPCS | Mod: U3

## 2019-01-17 PROCEDURE — 85027 COMPLETE CBC AUTOMATED: CPT | Performed by: STUDENT IN AN ORGANIZED HEALTH CARE EDUCATION/TRAINING PROGRAM

## 2019-01-17 PROCEDURE — 25000128 H RX IP 250 OP 636: Performed by: STUDENT IN AN ORGANIZED HEALTH CARE EDUCATION/TRAINING PROGRAM

## 2019-01-17 RX ORDER — SODIUM CHLORIDE, SODIUM LACTATE, POTASSIUM CHLORIDE, CALCIUM CHLORIDE 600; 310; 30; 20 MG/100ML; MG/100ML; MG/100ML; MG/100ML
1000 INJECTION, SOLUTION INTRAVENOUS CONTINUOUS
Status: DISCONTINUED | OUTPATIENT
Start: 2019-01-17 | End: 2019-01-18 | Stop reason: HOSPADM

## 2019-01-17 RX ORDER — OXYCODONE HYDROCHLORIDE 5 MG/1
5-10 TABLET ORAL EVERY 4 HOURS PRN
Status: DISCONTINUED | OUTPATIENT
Start: 2019-01-17 | End: 2019-01-18 | Stop reason: HOSPADM

## 2019-01-17 RX ADMIN — SODIUM CHLORIDE, POTASSIUM CHLORIDE, SODIUM LACTATE AND CALCIUM CHLORIDE 1000 ML: 600; 310; 30; 20 INJECTION, SOLUTION INTRAVENOUS at 01:07

## 2019-01-17 RX ADMIN — HYDROMORPHONE HYDROCHLORIDE 0.3 MG: 1 INJECTION, SOLUTION INTRAMUSCULAR; INTRAVENOUS; SUBCUTANEOUS at 20:55

## 2019-01-17 RX ADMIN — SODIUM CHLORIDE, POTASSIUM CHLORIDE, SODIUM LACTATE AND CALCIUM CHLORIDE 1000 ML: 600; 310; 30; 20 INJECTION, SOLUTION INTRAVENOUS at 10:51

## 2019-01-17 RX ADMIN — ACETAMINOPHEN 650 MG: 325 TABLET, FILM COATED ORAL at 13:08

## 2019-01-17 RX ADMIN — ACETAMINOPHEN 650 MG: 325 TABLET, FILM COATED ORAL at 01:38

## 2019-01-17 RX ADMIN — ACETAMINOPHEN 650 MG: 325 TABLET, FILM COATED ORAL at 09:03

## 2019-01-17 RX ADMIN — HYDROMORPHONE HYDROCHLORIDE 0.3 MG: 1 INJECTION, SOLUTION INTRAMUSCULAR; INTRAVENOUS; SUBCUTANEOUS at 01:35

## 2019-01-17 RX ADMIN — ACETAMINOPHEN 650 MG: 325 TABLET, FILM COATED ORAL at 19:54

## 2019-01-17 RX ADMIN — OXYCODONE HYDROCHLORIDE 5 MG: 5 TABLET ORAL at 13:08

## 2019-01-17 RX ADMIN — OXYCODONE HYDROCHLORIDE 5 MG: 5 TABLET ORAL at 09:03

## 2019-01-17 RX ADMIN — OXYCODONE HYDROCHLORIDE 5 MG: 5 TABLET ORAL at 19:54

## 2019-01-17 ASSESSMENT — PAIN DESCRIPTION - DESCRIPTORS
DESCRIPTORS: ACHING;SORE
DESCRIPTORS: SHARP;SORE
DESCRIPTORS: ACHING;SORE
DESCRIPTORS: BURNING
DESCRIPTORS: BURNING;DISCOMFORT

## 2019-01-17 ASSESSMENT — ACTIVITIES OF DAILY LIVING (ADL)
ADLS_ACUITY_SCORE: 14
ADLS_ACUITY_SCORE: 12
ADLS_ACUITY_SCORE: 13
ADLS_ACUITY_SCORE: 12
ADLS_ACUITY_SCORE: 13
ADLS_ACUITY_SCORE: 15

## 2019-01-17 NOTE — ANESTHESIA CARE TRANSFER NOTE
Patient: Bryanna Taylor    Procedure(s):  Percutaneous Transhepatic Cholangiography    Diagnosis: Bile Duct Injury  Diagnosis Additional Information: No value filed.    Anesthesia Type:   No value filed.     Note:  Airway :Nasal Cannula  Patient transferred to:PACU  Comments: VSS Report to Rn  Handoff Report: Identifed the Patient, Identified the Reponsible Provider, Reviewed the pertinent medical history, Discussed the surgical course, Reviewed Intra-OP anesthesia mangement and issues during anesthesia, Set expectations for post-procedure period and Allowed opportunity for questions and acknowledgement of understanding      Vitals: (Last set prior to Anesthesia Care Transfer)    CRNA VITALS  1/16/2019 1740 - 1/16/2019 1810      1/16/2019             Pulse:  85    Ht Rate:  84    SpO2:  97 %    Resp Rate (observed):  13                Electronically Signed By: PERLA Ventura CRNA  January 16, 2019  6:10 PM

## 2019-01-17 NOTE — PROGRESS NOTES
Care Coordinator - Discharge Planning    Admission Date/Time:  1/14/2019  Attending MD:  Michael Morillo MD     Data  Date of initial CC assessment:  Patient has been followed since admission upon transfer from H.  Per Dr. Morillo, he is requesting skilled home care RN follow up to assist patient with drain care management in home setting for patient with a bile duct injury during a lap choley procedure.  Patient's primary language is Paraguayan and a  was used by this writer for all transition planning which including arrangement of home care services and arrangement of establishment of a Primary Care Provider.  Chart reviewed, discussed with interdisciplinary team.   Patient was admitted for:   1. Injury of bile duct, initial encounter         Assessment   After meeting with Ms. Azeem Taylor at bedside with the use of a  and inquiring with her about a choice of home care agency in her home area, the following home care plans of care were arranged [facility] and includes establishment of primary care:      Please fax discharge orders to [facility] in place of [facility]    Ph: [phone]    Fax: [phone]    Skilled home care RN for initial home safety evaluation and 1-3 times a week to evaluate medication management, nutrition and hydration evaluation, endurance evaluation, and general status evaluation after discharge from the acute care hospital setting.       Skilled home care RN to assist with management and education reinforcement with abdominal drain cares per MD orders.  Please reinforce with patient to record drain outputs and to bring the recorded outputs with her to her follow up doctors appointments.     Skilled home care agency to provide a  with all home care visits.  Thank you.     Establishment of Primary Care Provider Appointment     [facility]  [facility]   [id number]     Clinic phone number- [phone]     Name of new Primary Care Provider: JOSSE Roman     Clinic Appointment Date:  Monday January 21, 2019     Time: 3:00 p.m.     Please bring your ID, Insurance Card and Medication List with you to your appointment.    Coordination of Care and Referrals: Provided patient/family with options for Home care agency of choice in home area and under patients insurance plan.  Plan  Anticipated Discharge Date:  To be determined, likely tomorrow per MD team.  Anticipated Discharge Plan:  As above and all discharge information to be reviewed with patient by her discharging bedside RN with the use of a  on the day of discharge.    CTS Handoff completed:  (Clinic Letter) To be sent.    Juanita Cartagena, B.S.N., P.H.N.,R.N.         Pager     Addendum:  Discharging MD team will have to sign home care paperwork until establishment of Primary Provider is established.    Addendum:  [facility] phoned to state that secondary to staffing, they have referred patient to [facility].

## 2019-01-17 NOTE — PROGRESS NOTES
Surgical Oncology Progress Note    Bryanna Taylor  [id number]    PTC unsuccessful yesterday due to non-dilated biliary ducts. No acute issues overnight. Afebrile. Pain is near LOREN drain site, adequately controlled. No nausea or emesis. Voiding and ambulating independently.    Temp:  [96.2  F (35.7  C)-98.2  F (36.8  C)] 97  F (36.1  C)  Pulse:  [61-91] 61  Heart Rate:  [60-92] 60  Resp:  [16-26] 16  BP: (112-134)/(57-85) 122/74  SpO2:  [92 %-97 %] 97 %    Intake/Output Summary (Last 24 hours) at 1/17/2019 0842  Last data filed at 1/17/2019 0659  Gross per 24 hour   Intake 3690 ml   Output 1791 ml   Net 1899 ml     NAD, resting comfortably  non-labored breathing on 1 L   soft, non tender, non distended   LOREN drain with bilious/serosanguinous output  warm and well perfused, no edema  alert, oriented, non-focal    WBC 9.6  TBILI 0.9  AST 52  ALT 82  CR 0.55  K 3.3    38 year old female POD 3 s/p lap sheryl for symptomatic cholelithiasis at outside facility c/b transected common bile duct, controlled with drain.     Advance diet as tolerated  Wean 1 L oxygen, encourage IS  LOREN drain to bulb suction - will switch to larger bag  PRN pain medications (encourage PO)  Ambulate QID, SCDs in bed  Discharge home tomorrow  Repeat MRCP in 10 days      Caren Graves MD  General Surgery PGY-3

## 2019-01-17 NOTE — PLAN OF CARE
VSS. Pain managed with tylenol. Went to OR for drain placement at 1200. Can breastfeed baby 24 hours after anesthesia. Lap sites intact with dermabond. LOREN with copious bilious output. Voiding spontaneously. Romansh speaking. Returned from OR at 1900. Declining pain medication. Ambulated from cart to bed. Clear liquid diet. Continue current POC.

## 2019-01-17 NOTE — ANESTHESIA POSTPROCEDURE EVALUATION
Anesthesia POST Procedure Evaluation    Patient: Bryanna Taylor   MRN:     [id number] Gender:   female   Age:    38 year old :      1980        Preoperative Diagnosis: Bile Duct Injury   Procedure(s):  Percutaneous Transhepatic Cholangiography   Postop Comments: No value filed.       Anesthesia Type:  General    Reportable Event: NO     PAIN: Uncomplicated   Sign Out status: Comfortable, Well controlled pain     PONV: No PONV   Sign Out status:  No Nausea or Vomiting     Neuro/Psych: Uneventful perioperative course   Sign Out Status: Preoperative baseline; Age appropriate mentation     Airway/Resp.: Uneventful perioperative course   Sign Out Status: Non labored breathing, age appropriate RR; Resp. Status within EXPECTED Parameters     CV: Uneventful perioperative course   Sign Out status: Appropriate BP and perfusion indices; Appropriate HR/Rhythm     Disposition:   Sign Out in:  PACU  Disposition:  Floor  Recovery Course: Uneventful  Follow-Up: Not required           Last Anesthesia Record Vitals:  CRNA VITALS  2019 1740 - 2019 1823      2019             Pulse:  85    Ht Rate:  84    SpO2:  97 %    Resp Rate (observed):  13          Last PACU/Preop Vitals:  Vitals:    19 0626 19 1320 19 1810   BP: 120/68 127/85 119/81   Pulse:   91   Resp:  16   Temp: 36  C (96.8  F) 36.7  C (98.1  F) 36.8  C (98.2  F)   SpO2: 97% 97% 92%         Electronically Signed By: Bob Jenkins MD, 2019, 6:23 PM

## 2019-01-17 NOTE — PLAN OF CARE
POD #3 lap sheryl from OSH, POD #1 S/P ERCP. East Timorese speaking patient,  present this morning.  phone at bedside. Up with assist of 1. Ambulating with staff. Pain managed with prn oxycodone and tylenol, with good relief. Lap site CDI/BRUNO. Bowel sound audible. Tolerating regular diet-denies nausea and vomiting. Lap site mid umbilical area. IVF  to 30 ml/hr. Right drain to gravity, putting large bilious drainage. PLAN: to continue with the care plan. Pain and drain management. Possible discharge to home tomorrow if medically stable.

## 2019-01-17 NOTE — PROGRESS NOTES
Documentation of Face to Face and Certification for Home Health Services     I certify that patient: Bryanna Taylor is under my care and that I, or a nurse practitioner or physician's assistant working with me, had a face-to-face encounter that meets the physician face-to-face encounter requirements with this patient on: January 17, 2019.     This encounter with the patient was in whole, or in part, for the following medical condition, which is the primary reason for home health care: Bile duct injury during a Lap Choley surgical procedure at [facility] resulting in a transfer to the [facility] for repair.     I certify that, based on my findings, the following services are medically necessary home health services: Skilled RN cares in home setting to assist with multiple drain cares including recording of drain outputs and drain cares per MD orders and prescribed lab draws.     My clinical findings support the need for the above services because: patient is overwhelmed with hospitalization in two [facility] and will benefit form ongoing skilled education for prescribed drain placements to assist with healing.       Further, I certify that my clinical findings support that this patient is homebound deconditioned from multiple hospitalizations.      Based on the above findings. I certify that this patient is confined to the home and needs intermittent skilled nursing.       The patient is under my care, and I have initiated the establishment of the plan of care.  This patient will be followed by a physician who will periodically review the plan of care.  Physician/Provider to provide follow up care: No Ref-Primary, Physician/Primary Care MD to be determined prior to discharge from the acute care hospital setting.  The discharging MD team will sign home care orders until primary MD care is established.  New Primary Provider, JOSSE Roman.  Appointment date to establish primary care  will be Monday January 21, 2109 at 3:00 p.m.     Attending hospital physician (the Medicare certified [facility] provider): Dr. Michael Morillo M.D.Physician Signature: See electronic signature associated with these discharge orders.     Date: 1/17/2019

## 2019-01-17 NOTE — PROGRESS NOTES
No acute events  PTC not possible last night despite significant effort  Remains AVSS  Drain with 390 bile yesterday    Will allow diet today  discontinue planning for tomorrow with drain in place  WIll plan for Follow-up in 2 weeks with repeat MRCP to assess if intrahepatic bile ducts are more apparent - if so, will re-attempt PTC effort at that time    Discussed plan for delayed repair of bile duct injury approximately 8 weeks from now. Magdy pictures to demonstrate current injury. She voiced clear understanding.

## 2019-01-17 NOTE — PLAN OF CARE
Healing abdominal incision CDI with dermabond. +bowel sounds. Pt denies passing gas, no nausea. LOREN with large amounts of bilious output. LOREN dressing saturated, changed x1. Pt reporting right abdominal pain near the LOREN, pain managed with Dilaudid x1 and PO Tylenol. Voiding spontaneously adequate amounts. On clear liquid diet. SBA. VSS on 1 L O2 NC. Luxembourgish speaking,  phone used overnight.  appointment at 0830 today.

## 2019-01-18 VITALS
DIASTOLIC BLOOD PRESSURE: 85 MMHG | BODY MASS INDEX: 49.11 KG/M2 | HEIGHT: 55 IN | TEMPERATURE: 97.8 F | OXYGEN SATURATION: 98 % | SYSTOLIC BLOOD PRESSURE: 140 MMHG | RESPIRATION RATE: 18 BRPM | WEIGHT: 212.2 LBS | HEART RATE: 64 BPM

## 2019-01-18 LAB
ALBUMIN SERPL-MCNC: 2.5 G/DL (ref 3.4–5)
ALP SERPL-CCNC: 137 U/L (ref 40–150)
ALT SERPL W P-5'-P-CCNC: 117 U/L (ref 0–50)
ANION GAP SERPL CALCULATED.3IONS-SCNC: 6 MMOL/L (ref 3–14)
AST SERPL W P-5'-P-CCNC: 79 U/L (ref 0–45)
BILIRUB SERPL-MCNC: 0.9 MG/DL (ref 0.2–1.3)
BUN SERPL-MCNC: 12 MG/DL (ref 7–30)
CALCIUM SERPL-MCNC: 8.1 MG/DL (ref 8.5–10.1)
CHLORIDE SERPL-SCNC: 104 MMOL/L (ref 94–109)
CO2 SERPL-SCNC: 28 MMOL/L (ref 20–32)
CREAT SERPL-MCNC: 0.64 MG/DL (ref 0.52–1.04)
ERYTHROCYTE [DISTWIDTH] IN BLOOD BY AUTOMATED COUNT: 13.5 % (ref 10–15)
GFR SERPL CREATININE-BSD FRML MDRD: >90 ML/MIN/{1.73_M2}
GLUCOSE SERPL-MCNC: 96 MG/DL (ref 70–99)
HCT VFR BLD AUTO: 36.9 % (ref 35–47)
HGB BLD-MCNC: 11.7 G/DL (ref 11.7–15.7)
MAGNESIUM SERPL-MCNC: 1.8 MG/DL (ref 1.6–2.3)
MCH RBC QN AUTO: 28.7 PG (ref 26.5–33)
MCHC RBC AUTO-ENTMCNC: 31.7 G/DL (ref 31.5–36.5)
MCV RBC AUTO: 91 FL (ref 78–100)
PHOSPHATE SERPL-MCNC: 4.2 MG/DL (ref 2.5–4.5)
PLATELET # BLD AUTO: 285 10E9/L (ref 150–450)
POTASSIUM SERPL-SCNC: 4.1 MMOL/L (ref 3.4–5.3)
PROT SERPL-MCNC: 6.5 G/DL (ref 6.8–8.8)
RBC # BLD AUTO: 4.07 10E12/L (ref 3.8–5.2)
SODIUM SERPL-SCNC: 138 MMOL/L (ref 133–144)
WBC # BLD AUTO: 9.8 10E9/L (ref 4–11)

## 2019-01-18 PROCEDURE — 25000132 ZZH RX MED GY IP 250 OP 250 PS 637: Performed by: STUDENT IN AN ORGANIZED HEALTH CARE EDUCATION/TRAINING PROGRAM

## 2019-01-18 PROCEDURE — 84100 ASSAY OF PHOSPHORUS: CPT | Performed by: STUDENT IN AN ORGANIZED HEALTH CARE EDUCATION/TRAINING PROGRAM

## 2019-01-18 PROCEDURE — 80053 COMPREHEN METABOLIC PANEL: CPT | Performed by: STUDENT IN AN ORGANIZED HEALTH CARE EDUCATION/TRAINING PROGRAM

## 2019-01-18 PROCEDURE — 83735 ASSAY OF MAGNESIUM: CPT | Performed by: STUDENT IN AN ORGANIZED HEALTH CARE EDUCATION/TRAINING PROGRAM

## 2019-01-18 PROCEDURE — 85027 COMPLETE CBC AUTOMATED: CPT | Performed by: STUDENT IN AN ORGANIZED HEALTH CARE EDUCATION/TRAINING PROGRAM

## 2019-01-18 PROCEDURE — 36415 COLL VENOUS BLD VENIPUNCTURE: CPT | Performed by: STUDENT IN AN ORGANIZED HEALTH CARE EDUCATION/TRAINING PROGRAM

## 2019-01-18 PROCEDURE — 99231 SBSQ HOSP IP/OBS SF/LOW 25: CPT | Performed by: PHYSICIAN ASSISTANT

## 2019-01-18 PROCEDURE — 25000132 ZZH RX MED GY IP 250 OP 250 PS 637: Performed by: SURGERY

## 2019-01-18 RX ORDER — OXYCODONE HYDROCHLORIDE 5 MG/1
5-10 TABLET ORAL EVERY 4 HOURS PRN
Qty: 30 TABLET | Refills: 0 | Status: ON HOLD | OUTPATIENT
Start: 2019-01-18 | End: 2019-02-06

## 2019-01-18 RX ORDER — AMOXICILLIN 250 MG
1 CAPSULE ORAL 2 TIMES DAILY PRN
Qty: 30 TABLET | Refills: 0 | Status: ON HOLD | OUTPATIENT
Start: 2019-01-18 | End: 2019-03-16

## 2019-01-18 RX ORDER — ACETAMINOPHEN 325 MG/1
650 TABLET ORAL EVERY 4 HOURS PRN
Qty: 50 TABLET | Refills: 0 | Status: ON HOLD | OUTPATIENT
Start: 2019-01-18 | End: 2019-03-16

## 2019-01-18 RX ADMIN — ACETAMINOPHEN 650 MG: 325 TABLET, FILM COATED ORAL at 09:12

## 2019-01-18 RX ADMIN — OXYCODONE HYDROCHLORIDE 10 MG: 5 TABLET ORAL at 01:06

## 2019-01-18 RX ADMIN — OXYCODONE HYDROCHLORIDE 10 MG: 5 TABLET ORAL at 11:59

## 2019-01-18 RX ADMIN — OXYCODONE HYDROCHLORIDE 10 MG: 5 TABLET ORAL at 06:58

## 2019-01-18 ASSESSMENT — ACTIVITIES OF DAILY LIVING (ADL)
ADLS_ACUITY_SCORE: 14

## 2019-01-18 NOTE — PLAN OF CARE
Right side abdominal drain CDI with brown drainage. Samoan speaking. Pt and  taught to empty, record drainage, and care for drain with  present. Pt's questions were answered and she verbalized understanding. Pain well controlled with oxycodone. VSS. Tolerating regular diet. Voiding without difficulty. Ambulating independently. Discharge instructions given via . Pt discharged home with .

## 2019-01-18 NOTE — PROGRESS NOTES
Surgical Oncology Progress Note    Interval History:  No acute events overnight. Denies pain this morning. Denies nausea.      Physical Exam:   Temp:  [97.5  F (36.4  C)-99.1  F (37.3  C)] 97.8  F (36.6  C)  Pulse:  [64-79] 64  Heart Rate:  [64-77] 64  Resp:  [18-29] 18  BP: (118-140)/(67-85) 140/85  SpO2:  [85 %-99 %] 98 %  General: Alert, oriented, appears comfortable, NAD.  Respiratory: breathing non labored  Abdomen: Abdomen is soft, non-tender, non-distended. Drain with bilious output.     Data:   All laboratory and imaging data in the past 24 hours reviewed  I/O last 3 completed shifts:  In: 1888 [P.O.:860; I.V.:1028]  Out: 2085 [Urine:1500; Drains:585]  Recent Labs   Lab Test 01/17/19  0740 01/15/19  0654 01/14/19  1745   WBC 9.6 9.7 14.3*   HGB 10.6* 11.8 12.5    254 261   INR  --   --  0.94      Recent Labs   Lab Test 01/17/19  0740 01/15/19  0654 01/14/19  1745    140 138   POTASSIUM 3.3* 3.4 3.9   CHLORIDE 106 104 104   CO2 28 29 26   BUN 12 9 12   CR 0.55 0.56 0.64   ANIONGAP 7 7 8   AZEB 8.1* 8.2* 8.2*   GLC 82 101* 110*     Recent Labs   Lab Test 01/17/19  0740   PROTTOTAL 6.2*   ALBUMIN 2.4*   BILITOTAL 0.9   ALKPHOS 108   AST 52*   ALT 82*     Assessment and Plan:     38 year old female POD 4 s/p lap sheryl for symptomatic cholelithiasis at outside facility c/b transected common bile duct, controlled with drain.      Regular diet  Wean oxygen, encourage IS  LOREN drain to bulb suction  PRN oxycodone and tylenol for pain  Ambulate QID, SCDs in bed  Discharge home today  Repeat MRCP and follow up with Dr. Morillo in 10 days    Seen with Chief resident who will discuss with Staff.     Alycia Patel PA-C   Surgical Oncology

## 2019-01-18 NOTE — PLAN OF CARE
Assumed care of pt [id number]. VSS. Pt denies pain. Tolerating regular diet. Abdominal incision CDI. R abdominal drain with bilious output draining to gravity. Up with SBA. Pt resting comfortably. Possible discharge home tomorrow.

## 2019-01-18 NOTE — PLAN OF CARE
1930-2330:  Slovenian speaking, utilized phone  for communication this shift.  Patient complained of burning pain in upper abdomen.  Tachypnea (RR 29) and shallow breathing, likely due to pain.  Oxycodone, Tylenol, and IV Dilaudid administered with reported decrease, but RR only down to 24.  MD notified, order for increased oxycodone dose received.  Patient would like to receive 2 tabs next time oxycodone is due.  Incentive spirometer performed with fair effort, but only able to get it up to 500 due to pain.  Education and encouragement provided.  Right drain with clear brown drainage.  Up in room with stand-by assist.  Voided adequate amount.  Reports positive flatus.  Tolerating small amounts of regular diet, denies nausea.  PIV infusing.  Breast pump at bedside, but patient declined to pump stating she didn't need to.

## 2019-01-18 NOTE — DISCHARGE SUMMARY
[facility] Discharge Summary    Bryanna Taylor MRN# [id number]   Age: 38 year old YOB: 1980     Date of Admission:  1/14/2019  Date of Discharge::  1/18/2019  Admitting Physician:  Michael Morillo MD  Discharge Physician:  Michael Morillo MD     PCP:  No Ref-Primary, Physician    Disposition: Patient discharged to home in stable condition.    Admission Diagnosis:  Bile duct injury  Symptomatic cholelithiasis  Gestational diabetes  Obesity  H. Pylori    Discharge Diagnosis:  Common bile duct leak   Symptomatic cholelithiasis  Gestational diabetes  Obesity  H. Pylori    Discharge medications  Current Discharge Medication List      START taking these medications    Details   acetaminophen (TYLENOL) 325 MG tablet Take 2 tablets (650 mg) by mouth every 4 hours as needed for mild pain  Qty: 50 tablet, Refills: 0    Associated Diagnoses: Pain      oxyCODONE (ROXICODONE) 5 MG tablet Take 1-2 tablets (5-10 mg) by mouth every 4 hours as needed for moderate to severe pain  Qty: 30 tablet, Refills: 0    Associated Diagnoses: Pain           Follow up, Special Instructions:  After Care     Future Labs/Procedures    Diet     Comments:    Follow this diet upon discharge: regular diet    Discharge Instructions     Comments:    If your travel plans upon discharge include prolonged periods of sitting (a lengthy car or plane ride), it is highly beneficial to get up and walk at least once per hour to help prevent swelling and blood clots.     You may shower, let warm soapy water run over incision and pat dry. Do not submerge, soak, or scrub.  Take incentive spirometer home for continued frequent use    You will be discharged with narcotic pain medications. Please take them only as needed and do not operate a car or heavy machinery for 24 hours after taking them.  Narcotic pain medications like oxycodone are constipating. Please ensure that you are drinking adequate amounts of fluids  "and taking stool softeners while you are taking narcotics. Take Miralax as needed for constipation.     Do not drive until you can with stand pressing the brake pedal quickly and fully without pain and you are not distracted by pain.     Call for fever greater than 101.5, chills, red skin around incision, drainage from incision, increased swelling from the incision, bleeding from the incision that is not controlled with light pressure, inability to tolerate diet,new nausea/vomiting or other new/worsening symptoms.   You may also call the surgical oncology nurse care coordinator from 8:00am-4:30pm ONI Perez at [phone]. For after hours questions or concerns you can contact the on-call surgical oncology resident (nights and weekends [phone] and ask \"I would like to page the Surgical Oncology Resident on call.\"). In emergencies, call [phone]    Follow Up:  Follow up in clinic with Dr. Morillo in 10-14 day. Will need MRCP and labs. You should be called to make an appointment within 3 business days. If you are not contacted, call [phone] to make an appointment.    Tubes and drains     Comments:    You are going home with the following tubes or drains: LOREN drain. Record daily output.        Procedures:  none    Consultations:  GI PANCREATICOBILIARY ADULT IP CONSULT  GYNECOLOGY IP CONSULT  INTERVENTIONAL RADIOLOGY ADULT/PEDS IP CONSULT  SOCIAL WORK IP CONSULT    Brief HPI:  Bryanna Taylor is a 38 year old female who underwent a laparoscopic cholecystectomy 1/14/19 at an outside hospital that was complicated by a suspected bile duct injury. Cholangiogram showed no opacification of the biliary tree. A drain was placed, the gallbladder was removed and she was transferred to [facility].     Hospital Course:  The patient was admitted and had an MRCP on 1/14 that demonstrated a bile leak from the common bile duct at the level of the cystic duct. She had an ERCP on 1/15 that demonstrated a transected common bile " duct with the distal end clipped. On 1/16 Interventional Radiology attempted biliary drain placement but this was unsuccessful on several attempts due to nondilated system. She was advanced to a regular diet. Her leak was well managed with her surgical drain. She was discharged with the surgical drain and will follow up in 10-14 days with Dr. Morillo with an MRCP.     Alycia Patel PA-C

## 2019-01-18 NOTE — PROVIDER NOTIFICATION
Notified Dr. Maria Guadalupe Guthrie at 2128 regarding tachypnea.  RR 29 at the start of my shift.  Patient seems to be taking shallow breaths due to pain. Reports pain in upper abdomen, described as burning.  Treated her with oxycodone and Tylenol, then later IV Dilaudid.  Now RR 24.  Attempted to have patient use incentive spirometer, with fair effort but was only able to get it up to 500 due to pain in upper abdomen.  Patient does not report sudden increase or change in pain.    Orders were obtained for increase in oxycodone dose.

## 2019-01-18 NOTE — PLAN OF CARE
D/I: Pt is alert and oriented x 4, vitally stable on room air. Cook Islander speaking.  Abdominal pain is controlled with Oxycodone x3 this shift. Pt was desating to 89% when sleeping. Was placed on 1.5L and then sats 95%. IS use was enforce and after IS use oxygen saturation increased to 99%. IS education reinforced.  Right drain with clear brown drainage.  Up in room with stand-by assist. Reports positive flatus.  Tolerating small amounts of regular diet, denies nausea.  PIV infusing. Pt is a breastfeeding mother. Breast pump available.  P: Continue to monitor pt and follow plan of care

## 2019-01-21 ENCOUNTER — CARE COORDINATION (OUTPATIENT)
Dept: SURGERY | Facility: CLINIC | Age: 39
End: 2019-01-21

## 2019-01-21 DIAGNOSIS — S36.13XD INJURY OF BILE DUCT, SUBSEQUENT ENCOUNTER: Primary | ICD-10-CM

## 2019-01-21 NOTE — PROGRESS NOTES
Post Op Discharge Call    Surgery: Gallbladder drain placed.     Discharge Date:  1/18/19    Immediate Concerns: None at this time.     Pain: No issues with pain at this time.     Drains: Draining well, no concerns at this time. She reports drain is still draining and she is flushing.     Diet: Regular, tolerating well. Denies nausea, vomiting.     Activity: No difficulty with ADLs reported. Patient up independently at home.     Post op/follow up plans: Post op appointment to be scheduled with MRI prior to appointment with Dr. Morillo. Informed her she will be called to confirm these appointments.     Patient has our direct number for any questions or concerns that may arise.      Oriana Perez, RN, BSN  Care Coordinator - Dr. Morillo  [phone]

## 2019-02-02 ENCOUNTER — APPOINTMENT (OUTPATIENT)
Dept: MRI IMAGING | Facility: CLINIC | Age: 39
DRG: 446 | End: 2019-02-02
Payer: COMMERCIAL

## 2019-02-02 ENCOUNTER — HOSPITAL ENCOUNTER (INPATIENT)
Facility: CLINIC | Age: 39
LOS: 4 days | Discharge: HOME HEALTH | DRG: 446 | End: 2019-02-06
Attending: EMERGENCY MEDICINE | Admitting: SURGERY
Payer: COMMERCIAL

## 2019-02-02 ENCOUNTER — TELEPHONE (OUTPATIENT)
Dept: SURGERY | Facility: CLINIC | Age: 39
End: 2019-02-02

## 2019-02-02 DIAGNOSIS — K83.1 BILIARY OBSTRUCTION (H): ICD-10-CM

## 2019-02-02 DIAGNOSIS — S36.13XA INJURY OF BILE DUCT, INITIAL ENCOUNTER: Primary | ICD-10-CM

## 2019-02-02 PROBLEM — K91.81 INJURY OF COMMON BILE DUCT DURING OPERATIVE PROCEDURE: Status: ACTIVE | Noted: 2019-02-02

## 2019-02-02 LAB
ALBUMIN SERPL-MCNC: 3.3 G/DL (ref 3.4–5)
ALP SERPL-CCNC: 781 U/L (ref 40–150)
ALT SERPL W P-5'-P-CCNC: 533 U/L (ref 0–50)
ANION GAP SERPL CALCULATED.3IONS-SCNC: 9 MMOL/L (ref 3–14)
AST SERPL W P-5'-P-CCNC: 384 U/L (ref 0–45)
BASOPHILS # BLD AUTO: 0 10E9/L (ref 0–0.2)
BASOPHILS NFR BLD AUTO: 0.6 %
BILIRUB SERPL-MCNC: 3.3 MG/DL (ref 0.2–1.3)
BUN SERPL-MCNC: 7 MG/DL (ref 7–30)
CALCIUM SERPL-MCNC: 9.1 MG/DL (ref 8.5–10.1)
CHLORIDE SERPL-SCNC: 104 MMOL/L (ref 94–109)
CO2 SERPL-SCNC: 26 MMOL/L (ref 20–32)
CREAT SERPL-MCNC: 0.59 MG/DL (ref 0.52–1.04)
DIFFERENTIAL METHOD BLD: NORMAL
EOSINOPHIL # BLD AUTO: 0.1 10E9/L (ref 0–0.7)
EOSINOPHIL NFR BLD AUTO: 1.5 %
ERYTHROCYTE [DISTWIDTH] IN BLOOD BY AUTOMATED COUNT: 13.2 % (ref 10–15)
GFR SERPL CREATININE-BSD FRML MDRD: >90 ML/MIN/{1.73_M2}
GLUCOSE SERPL-MCNC: 100 MG/DL (ref 70–99)
HCT VFR BLD AUTO: 38.3 % (ref 35–47)
HGB BLD-MCNC: 12.8 G/DL (ref 11.7–15.7)
IMM GRANULOCYTES # BLD: 0 10E9/L (ref 0–0.4)
IMM GRANULOCYTES NFR BLD: 0.4 %
LIPASE SERPL-CCNC: 237 U/L (ref 73–393)
LYMPHOCYTES # BLD AUTO: 1.4 10E9/L (ref 0.8–5.3)
LYMPHOCYTES NFR BLD AUTO: 18.9 %
MCH RBC QN AUTO: 29 PG (ref 26.5–33)
MCHC RBC AUTO-ENTMCNC: 33.4 G/DL (ref 31.5–36.5)
MCV RBC AUTO: 87 FL (ref 78–100)
MONOCYTES # BLD AUTO: 0.5 10E9/L (ref 0–1.3)
MONOCYTES NFR BLD AUTO: 7.1 %
NEUTROPHILS # BLD AUTO: 5.1 10E9/L (ref 1.6–8.3)
NEUTROPHILS NFR BLD AUTO: 71.5 %
NRBC # BLD AUTO: 0 10*3/UL
NRBC BLD AUTO-RTO: 0 /100
PLATELET # BLD AUTO: 361 10E9/L (ref 150–450)
POTASSIUM SERPL-SCNC: 3.1 MMOL/L (ref 3.4–5.3)
PROT SERPL-MCNC: 8.2 G/DL (ref 6.8–8.8)
RBC # BLD AUTO: 4.42 10E12/L (ref 3.8–5.2)
SODIUM SERPL-SCNC: 139 MMOL/L (ref 133–144)
WBC # BLD AUTO: 7.2 10E9/L (ref 4–11)

## 2019-02-02 PROCEDURE — 25000132 ZZH RX MED GY IP 250 OP 250 PS 637: Performed by: EMERGENCY MEDICINE

## 2019-02-02 PROCEDURE — 40000559 ZZH STATISTIC FAILED PERIPHERAL IV START

## 2019-02-02 PROCEDURE — 74183 MRI ABD W/O CNTR FLWD CNTR: CPT

## 2019-02-02 PROCEDURE — 25500064 ZZH RX 255 OP 636: Performed by: RADIOLOGY

## 2019-02-02 PROCEDURE — 25800025 ZZH RX 258: Performed by: NEUROLOGICAL SURGERY

## 2019-02-02 PROCEDURE — 25000128 H RX IP 250 OP 636: Performed by: NEUROLOGICAL SURGERY

## 2019-02-02 PROCEDURE — 40000141 ZZH STATISTIC PERIPHERAL IV START W/O US GUIDANCE

## 2019-02-02 PROCEDURE — 99285 EMERGENCY DEPT VISIT HI MDM: CPT | Mod: 25 | Performed by: EMERGENCY MEDICINE

## 2019-02-02 PROCEDURE — 80053 COMPREHEN METABOLIC PANEL: CPT | Performed by: EMERGENCY MEDICINE

## 2019-02-02 PROCEDURE — 25000132 ZZH RX MED GY IP 250 OP 250 PS 637: Performed by: NEUROLOGICAL SURGERY

## 2019-02-02 PROCEDURE — 85025 COMPLETE CBC W/AUTO DIFF WBC: CPT | Performed by: EMERGENCY MEDICINE

## 2019-02-02 PROCEDURE — A9585 GADOBUTROL INJECTION: HCPCS | Performed by: RADIOLOGY

## 2019-02-02 PROCEDURE — 83690 ASSAY OF LIPASE: CPT | Performed by: EMERGENCY MEDICINE

## 2019-02-02 PROCEDURE — 99285 EMERGENCY DEPT VISIT HI MDM: CPT | Mod: Z6 | Performed by: EMERGENCY MEDICINE

## 2019-02-02 PROCEDURE — 12000001 ZZH R&B MED SURG/OB UMMC

## 2019-02-02 RX ORDER — ACETAMINOPHEN 325 MG/1
650 TABLET ORAL EVERY 4 HOURS PRN
Status: DISCONTINUED | OUTPATIENT
Start: 2019-02-02 | End: 2019-02-02

## 2019-02-02 RX ORDER — DEXTROSE MONOHYDRATE, SODIUM CHLORIDE, AND POTASSIUM CHLORIDE 50; 1.49; 4.5 G/1000ML; G/1000ML; G/1000ML
INJECTION, SOLUTION INTRAVENOUS CONTINUOUS
Status: DISCONTINUED | OUTPATIENT
Start: 2019-02-02 | End: 2019-02-06 | Stop reason: HOSPADM

## 2019-02-02 RX ORDER — PIPERACILLIN SODIUM, TAZOBACTAM SODIUM 4; .5 G/20ML; G/20ML
4.5 INJECTION, POWDER, LYOPHILIZED, FOR SOLUTION INTRAVENOUS EVERY 6 HOURS
Status: DISCONTINUED | OUTPATIENT
Start: 2019-02-02 | End: 2019-02-05

## 2019-02-02 RX ORDER — GADOBUTROL 604.72 MG/ML
0.1 INJECTION INTRAVENOUS ONCE
Status: COMPLETED | OUTPATIENT
Start: 2019-02-02 | End: 2019-02-02

## 2019-02-02 RX ORDER — OXYCODONE HYDROCHLORIDE 5 MG/1
5-10 TABLET ORAL
Status: DISCONTINUED | OUTPATIENT
Start: 2019-02-02 | End: 2019-02-06 | Stop reason: HOSPADM

## 2019-02-02 RX ORDER — OXYCODONE HYDROCHLORIDE 5 MG/1
5 TABLET ORAL ONCE
Status: COMPLETED | OUTPATIENT
Start: 2019-02-02 | End: 2019-02-02

## 2019-02-02 RX ORDER — HYDROMORPHONE HCL/0.9% NACL/PF 0.2MG/0.2
0.2 SYRINGE (ML) INTRAVENOUS
Status: DISCONTINUED | OUTPATIENT
Start: 2019-02-02 | End: 2019-02-06 | Stop reason: HOSPADM

## 2019-02-02 RX ORDER — NALOXONE HYDROCHLORIDE 0.4 MG/ML
.1-.4 INJECTION, SOLUTION INTRAMUSCULAR; INTRAVENOUS; SUBCUTANEOUS
Status: DISCONTINUED | OUTPATIENT
Start: 2019-02-02 | End: 2019-02-06 | Stop reason: HOSPADM

## 2019-02-02 RX ORDER — ONDANSETRON 4 MG/1
4 TABLET, ORALLY DISINTEGRATING ORAL EVERY 6 HOURS PRN
Status: DISCONTINUED | OUTPATIENT
Start: 2019-02-02 | End: 2019-02-06 | Stop reason: HOSPADM

## 2019-02-02 RX ORDER — ONDANSETRON 2 MG/ML
4 INJECTION INTRAMUSCULAR; INTRAVENOUS EVERY 6 HOURS PRN
Status: DISCONTINUED | OUTPATIENT
Start: 2019-02-02 | End: 2019-02-06 | Stop reason: HOSPADM

## 2019-02-02 RX ADMIN — POTASSIUM CHLORIDE, DEXTROSE MONOHYDRATE AND SODIUM CHLORIDE: 150; 5; 450 INJECTION, SOLUTION INTRAVENOUS at 20:19

## 2019-02-02 RX ADMIN — Medication 0.2 MG: at 20:27

## 2019-02-02 RX ADMIN — OXYCODONE HYDROCHLORIDE 5 MG: 5 TABLET ORAL at 15:59

## 2019-02-02 RX ADMIN — OXYCODONE HYDROCHLORIDE 5 MG: 5 TABLET ORAL at 22:36

## 2019-02-02 RX ADMIN — GADOBUTROL 9.6 ML: 604.72 INJECTION INTRAVENOUS at 14:10

## 2019-02-02 RX ADMIN — PIPERACILLIN AND TAZOBACTAM 4.5 G: 4; .5 INJECTION, POWDER, FOR SOLUTION INTRAVENOUS at 20:21

## 2019-02-02 ASSESSMENT — ENCOUNTER SYMPTOMS
ABDOMINAL PAIN: 1
NAUSEA: 0
FEVER: 0
DIARRHEA: 0
CHILLS: 0
VOMITING: 0

## 2019-02-02 ASSESSMENT — MIFFLIN-ST. JEOR: SCORE: 1332.7

## 2019-02-02 ASSESSMENT — ACTIVITIES OF DAILY LIVING (ADL): ADLS_ACUITY_SCORE: 14

## 2019-02-02 NOTE — ED NOTES
[facility], [place]   ED Nurse to Floor Handoff     Bryanna Taylor is a 38 year old female who speaks Arabic and lives with a spouse,  in a home  They arrived in the ED by car from home    ED Chief Complaint: Catheter Problem    ED Dx;   Final diagnoses:   Biliary obstruction         Needed?: Yes    Allergies: No Known Allergies.  Past Medical Hx: History reviewed. No pertinent past medical history.   Baseline Mental status: WDL  Current Mental Status changes: at basesline    Infection present or suspected this encounter: no  Sepsis suspected: No  Isolation type: No active isolations     Activity level - Baseline/Home:  Independent  Activity Level - Current:   Stand with Assist    Bariatric equipment needed?: No    In the ED these meds were given:   Medications   gadobutrol (GADAVIST) injection 9.62 mL (9.6 mLs Intravenous Given 2/2/19 1410)   oxyCODONE (ROXICODONE) tablet 5 mg (5 mg Oral Given 2/2/19 4699)       Drips running?  No    Home pump  No    Current LDAs  Peripheral IV 02/02/19 Right Lower forearm (Active)   Site Assessment WDL 2/2/2019 11:36 AM   Line Status Checked every 1-2 hour;Saline locked 2/2/2019 11:36 AM   Number of days: 0       Closed/Suction Drain 1 Right Abdomen Bulb (Active)   Number of days: 19       Incision/Surgical Site 01/14/19 Midline Abdomen (Active)   Number of days: 19       Labs results:   Labs Ordered and Resulted from Time of ED Arrival Up to the Time of Departure from the ED   COMPREHENSIVE METABOLIC PANEL - Abnormal; Notable for the following components:       Result Value    Potassium 3.1 (*)     Glucose 100 (*)     Bilirubin Total 3.3 (*)     Albumin 3.3 (*)     Alkaline Phosphatase 781 (*)      (*)      (*)     All other components within normal limits   CBC WITH PLATELETS DIFFERENTIAL   LIPASE       Imaging Studies:   Recent Results (from the past 24 hour(s))   MR Abdomen MRCP w/o & w Contrast    Narrative    MRCP  Without and With Contrast    CLINICAL HISTORY: Abn ERCP; hx of recent IR stent placement, RUQ pain  and no drainage    DATE: 2/2/2019 3:30 PM    TECHNIQUE:     Images were acquired with and without intravenous gadolinium contrast  through the upper abdomen. The following MR images were acquired  without intravenous contrast: TrueFISP, multiplanar T2-weighted, axial  T1 in/out of phase, T2-weighted MRCP images, axial diffusion-weighted  and axial apparent diffusion coefficient. T1-weighted images were  obtained before contrast at the multiple time points following  contrast injection. 3-D reformatted images were generated by the  technologist. Contrast dose: 9.6CC GADAVIST    Comparison study: MR liver 1/14/2018, biliary drain placement  fluoroscopic images 1/16/2018    FINDINGS:    Biliary Tree: Dilation of the intrahepatic bile ducts, most prominent  centrally. There is truncation of the common bile duct on the MRCP  images, which measures proximally 2 cm, and is never seen contiguous  with the distal common bile duct at the level of the pancreatic head,  compatible with common bile duct ligation.    Pancreas: Normal    Liver: No focal hepatic lesion. Diffuse heterogeneous T2 signal,  diffusion restriction, enhancement compatible with hepatic  inflammation in a pattern suggestive of cholangitis. There is a  minimal amount of fluid inferior to the liver at the site of the tip  of the abdominal drain, which is inserted through the right lateral  abdominal wall. There is also trace fluid in the right upper paracolic  gutter. Diffuse extensive periportal edema.    Gallbladder: Surgically absent    Spleen: Normal    Kidneys: Normal    Adrenal glands: Normal    Bowel: Normal caliber.    Lymph nodes: No lymphadenopathy    Blood vessels: The portal veins, hepatic veins, and hepatic artery are  patent. No aortic aneurysm.    Lung bases: Dependent atelectasis.    Bones and soft tissues: No worrisome osseous  lesion.    Mesentery and abdominal wall: Unremarkable    Ascites: Minimal fluid inferior to the liver and right paracolic  gutter as described above.      Impression    IMPRESSION:   1. Increased intrahepatic biliary dilation in comparison to the prior  exams. The common bile duct appears truncated after a 2 cm segment,  and is never seen to be contiguous with the distal common bile duct  visualized at the level of the pancreatic head compatible with common  bile duct ligation.  2. The surgical drain inserted through the right lateral abdominal  wall is positioned with the tip along the inferior margin of the liver  at the cholecystectomy bed, and there is minimal fluid seen at this  location and along the right paracolic gutter.  3. Heterogeneous enhancement of the liver with periportal edema and  inflammation suspicious for cholangitis.    I have personally reviewed the examination and initial interpretation  and I agree with the findings.    KELSEY WALLIS MD       Recent vital signs:   /74   Pulse 71   Temp 98.8  F (37.1  C) (Oral)   Resp 20   Wt 96.2 kg (212 lb)   LMP  (LMP Unknown)   SpO2 97%   BMI 55.12 kg/m      Cardiac Rhythm: Normal Sinus  Pt needs tele? No  Skin/wound Issues: none. Drain issue - bili drain is not having output since yesterday. Fluid backing up causing pain for pt.     Code Status: Full Code    Pain control: fair    Nausea control: pt had none    Abnormal labs/tests/findings requiring intervention: see results    Family present during ED course? Yes   Family Comments/Social Situation comments:     Tasks needing completion: None    Akash Bah, RN  [phone] [facility]

## 2019-02-02 NOTE — TELEPHONE ENCOUNTER
I received a page from patient. She reports recurrence of abdominal pain. Her biliary drain has stopped putting out, no output since she last emptied it yesterday afternoon and she is now experiencing RUQ and abdominal pain requiring po narcotic. She hasn't taken any narcotic pain since discharge and has been doing well until last night when the pain recurred. She denies fevers, N/V.  Of note patient is s/p lap sheryl for symptomatic cholelithiasis at outside facility on   c/b transected common bile duct, controlled with drain. She was discharged with the surgical drain and will follow up in 10-14 days with Dr. Morillo with an MRCP.   Patient was advised to come to [facility] ED for further evaluation and she verbalized understanding.      Cosmo Gaston MD   General Surgery, (PGY2)  [phone]

## 2019-02-02 NOTE — H&P
Surgery Consult    CC: abdominal pain; decreased drain output    HPI: 38F known to hpb service after admission from OSH on 1/14 due to intra-operative bile duct injury during lap cholecystectomy.  Drain was placed, patient was transferred.  Imaging showed injury at level of cystic duct.  PTC attempted but unable to perform due to small size of bile ducts, did demonstrate patient common hepatic duct.  Discharged 1/18 on abx with plan to f/u with Dr. Morillo with repeat MRCP.  Patient called careline today with concerns of decreased drain output since yesterday afternoon along with increased abdominal pain and decreased appetite.  No nausea, vomiting, fevers, chills, chest pain, shortness of breath.  Has not noticed change in stool color or yellowing of eyes.    PMH  none    PSH  Past Surgical History:   Procedure Laterality Date     ENDOSCOPIC RETROGRADE CHOLANGIOPANCREATOGRAM N/A 1/15/2019    Procedure: ENDOSCOPIC RETROGRADE CHOLANGIOPANCREATOGRAM, biliary sphincterotomy;  Surgeon: Ryan Costa MD;  Location: [facility] OR     IR BILIARY DRAIN PLACEMENT  1/16/2019   lap sheryl 1/14    Soc:  Social History     Socioeconomic History     Marital status:      Spouse name: Not on file     Number of children: Not on file     Years of education: Not on file     Highest education level: Not on file   Social Needs     Financial resource strain: Not on file     Food insecurity - worry: Not on file     Food insecurity - inability: Not on file     Transportation needs - medical: Not on file     Transportation needs - non-medical: Not on file   Occupational History     Not on file   Tobacco Use     Smoking status: Never Smoker     Smokeless tobacco: Never Used   Substance and Sexual Activity     Alcohol use: Not on file     Drug use: Not on file     Sexual activity: Not on file   Other Topics Concern     Parent/sibling w/ CABG, MI or angioplasty before 65F 55M? Not Asked   Social History Narrative     Not on file        Fam:  No family hx of bleeding or clotting disorders or difficulties with anesthesia    All:   No Known Allergies    Meds:  No current facility-administered medications for this encounter.      Current Outpatient Medications   Medication     acetaminophen (TYLENOL) 325 MG tablet     senna-docusate (SENOKOT-S/PERICOLACE) 8.6-50 MG tablet       ROS:  10 point ROS is negative except as noted in HPI    PE:  /72   Pulse 93   Temp 98.8  F (37.1  C) (Oral)   Resp 20   Wt 96.2 kg (212 lb)   LMP  (LMP Unknown)   SpO2 98%   BMI 55.12 kg/m      NAD  Eomi, mildly icteric sclerae  NLB  RRR  Obese, abd soft with minimal RUQ and epigastric tenderness; drain in RUQ with bilious drainage in tubing.  Lap sherly incision sites c/d/i.  Ext wwp    Labs:     WBC 7.2  Hgb 12.8  Plt 361    K 3.1    Tbili 3.3 (0.9 on 1/18)  Alk phos 781 (137)   (117)   (79)    Imaging:   mrcp - increased IH biliary dilation; cbd truncated after 2cm and not continguous with distal cbd.  Inflammation concerning for choangitis    A/p: 38F with cbd injury during lap sheryl on 1/14, s/p attempted ptc tube placement, discharged with surgical drain in place on 1/18, now with occluded duct and intrahepatic biliary dilation    -admit to surgical oncology  -npo, ivf, pain control  -zosyn  --no plans for surgical reconstruction for ~6 weeks; IR consult placed for ptc tube; discussed with IR staff; timing tbd    Discussed with Dr. James, on call staff    Keyshawn Redmond MD  [email or web address]  [phone]

## 2019-02-02 NOTE — ED TRIAGE NOTES
ED TRIAGE    Medical / Trauma C/o:  38-yr female patient - presenting to ED for eval of drain/catheter issue; requesting a Haitian-.    Duration of C/o:  Today    Contributing Factors / Concerning HX:  See HX    Significant Med's / Tx's:  See med's    Febrile / Afebrile:  Afebrile    Patient Vitals for the past 24 hrs:   BP Temp Temp src Pulse Heart Rate Resp SpO2 Weight   02/02/19 1112 119/72 98.8  F (37.1  C) Oral 93 93 20 98 % 96.2 kg (212 lb)       Constantin Samaniego  February 2, 2019  11:15 AM

## 2019-02-02 NOTE — ED PROVIDER NOTES
[place] EMERGENCY DEPARTMENT ([facility])  2/02/19   History     Chief Complaint   Patient presents with     Catheter Problem     The history is provided by the patient and medical records. A  was used (Official iPad ).     Bryanna Taylor is a 38 year old female who underwent a laparoscopic cholecystectomy on 1/14/2019 at an outside hospital that was complicated by a suspected bile duct injury.  Cholangiogram showed no opacification of the biliary tree, a drain was placed, the gallbladder was removed and she was transferred here to [facility].  The patient was hospitalized here from 1/14/2019 to 1/18/2019.  The patient had an MRCP on 1/14/2019 that demonstrated a bile leak from the common bile duct at the level of the cystic duct.  ERCP on 1/15/2019 demonstrated a transected common bile duct with the distal end clipped.  On 1/16/2019 IR attempted biliary drain placement, but was unsuccessful on several attempts due to nondilated system.  The patient's leak was well managed with her surgical drain.  She was discharged with a surgical drain in place and instructed to follow-up in 10-14 days with Dr. Morillo with an MRCP.    The patient presents to the Emergency Department today for evaluation of stopped drainage from her surgical drain since 3 PM yesterday with associated abdominal pain.  The patient reports that she has had good drainage of the surgical drain since it was placed; however, the drainage stopped yesterday at 3 PM and she subsequently developed abdominal pain.  She denies any drainage surrounding the insertion site.  She denies any fevers, chills, nausea, vomiting or diarrhea.  She states that she took Tylenol this morning at approximately 8 AM and she does not have any abdominal pain currently in the Emergency Department.  Patient is not currently on any antibiotics.  The patient did contact her surgical team prior to arrival.    I have reviewed the  Medications, Allergies, Past Medical and Surgical History, and Social History in the Epic system.    History reviewed. No pertinent past medical history.    Past Surgical History:   Procedure Laterality Date     ENDOSCOPIC RETROGRADE CHOLANGIOPANCREATOGRAM N/A 1/15/2019    Procedure: ENDOSCOPIC RETROGRADE CHOLANGIOPANCREATOGRAM, biliary sphincterotomy;  Surgeon: Ryan Costa MD;  Location: [facility] OR     IR BILIARY DRAIN PLACEMENT  1/16/2019       No family history on file.    Social History     Tobacco Use     Smoking status: Never Smoker     Smokeless tobacco: Never Used   Substance Use Topics     Alcohol use: Not on file       No current facility-administered medications for this encounter.      Current Outpatient Medications   Medication     acetaminophen (TYLENOL) 325 MG tablet     senna-docusate (SENOKOT-S/PERICOLACE) 8.6-50 MG tablet      No Known Allergies      Review of Systems   Constitutional: Negative for chills and fever.   Gastrointestinal: Positive for abdominal pain. Negative for diarrhea, nausea and vomiting.        Positive for stopped drainage from surgical drain   Skin:        Negative for drainage surrounding insertion site   All other systems reviewed and are negative.      Physical Exam   BP: 119/72  Pulse: 93  Heart Rate: 93  Temp: 98.8  F (37.1  C)  Resp: 20  Weight: 96.2 kg (212 lb)  SpO2: 98 %      Physical Exam  Physical Exam   Constitutional: oriented to person, place, and time. appears well-developed and well-nourished.   HENT:   Head: Normocephalic and atraumatic.   Neck: Normal range of motion.   Pulmonary/Chest: Effort normal. No respiratory distress.   Cardiac: No murmurs, rubs, gallops. RRR.  Abdominal: Site of catheter without erythema, drainage.  Minimal right upper quadrant tenderness palpation without guarding.  Abdomen soft, nondistended. No rebound tenderness.  MSK: Long bones without deformity or evidence of trauma  Neurological: alert and oriented to person, place, and  time.   Skin: Skin is warm and dry.   Psychiatric:  normal mood and affect.  behavior is normal. Thought content normal.     ED Course   11:36 AM  The patient was seen and examined by Rafa Woo MD in Room ED21.        Procedures  Results for orders placed or performed during the hospital encounter of 02/02/19   MR Abdomen MRCP w/o & w Contrast    Narrative    MRCP Without and With Contrast    CLINICAL HISTORY: Abn ERCP; hx of recent IR stent placement, RUQ pain  and no drainage    DATE: 2/2/2019 3:30 PM    TECHNIQUE:     Images were acquired with and without intravenous gadolinium contrast  through the upper abdomen. The following MR images were acquired  without intravenous contrast: TrueFISP, multiplanar T2-weighted, axial  T1 in/out of phase, T2-weighted MRCP images, axial diffusion-weighted  and axial apparent diffusion coefficient. T1-weighted images were  obtained before contrast at the multiple time points following  contrast injection. 3-D reformatted images were generated by the  technologist. Contrast dose: 9.6CC GADAVIST    Comparison study: MR liver 1/14/2018, biliary drain placement  fluoroscopic images 1/16/2018    FINDINGS:    Biliary Tree: Dilation of the intrahepatic bile ducts, most prominent  centrally. There is truncation of the common bile duct on the MRCP  images, which measures proximally 2 cm, and is never seen contiguous  with the distal common bile duct at the level of the pancreatic head,  compatible with common bile duct ligation.    Pancreas: Normal    Liver: No focal hepatic lesion. Diffuse heterogeneous T2 signal,  diffusion restriction, enhancement compatible with hepatic  inflammation in a pattern suggestive of cholangitis. There is a  minimal amount of fluid inferior to the liver at the site of the tip  of the abdominal drain, which is inserted through the right lateral  abdominal wall. There is also trace fluid in the right upper paracolic  gutter. Diffuse extensive periportal  edema.    Gallbladder: Surgically absent    Spleen: Normal    Kidneys: Normal    Adrenal glands: Normal    Bowel: Normal caliber.    Lymph nodes: No lymphadenopathy    Blood vessels: The portal veins, hepatic veins, and hepatic artery are  patent. No aortic aneurysm.    Lung bases: Dependent atelectasis.    Bones and soft tissues: No worrisome osseous lesion.    Mesentery and abdominal wall: Unremarkable    Ascites: Minimal fluid inferior to the liver and right paracolic  gutter as described above.      Impression    IMPRESSION:   1. Increased intrahepatic biliary dilation in comparison to the prior  exams. The common bile duct appears truncated after a 2 cm segment,  and is never seen to be contiguous with the distal common bile duct  visualized at the level of the pancreatic head compatible with common  bile duct ligation.  2. The surgical drain inserted through the right lateral abdominal  wall is positioned with the tip along the inferior margin of the liver  at the cholecystectomy bed, and there is minimal fluid seen at this  location and along the right paracolic gutter.  3. Heterogeneous enhancement of the liver with periportal edema and  inflammation suspicious for cholangitis.    I have personally reviewed the examination and initial interpretation  and I agree with the findings.    KELSEY WALLIS MD   CBC with platelets differential   Result Value Ref Range    WBC 7.2 4.0 - 11.0 10e9/L    RBC Count 4.42 3.8 - 5.2 10e12/L    Hemoglobin 12.8 11.7 - 15.7 g/dL    Hematocrit 38.3 35.0 - 47.0 %    MCV 87 78 - 100 fl    MCH 29.0 26.5 - 33.0 pg    MCHC 33.4 31.5 - 36.5 g/dL    RDW 13.2 10.0 - 15.0 %    Platelet Count 361 150 - 450 10e9/L    Diff Method Automated Method     % Neutrophils 71.5 %    % Lymphocytes 18.9 %    % Monocytes 7.1 %    % Eosinophils 1.5 %    % Basophils 0.6 %    % Immature Granulocytes 0.4 %    Nucleated RBCs 0 0 /100    Absolute Neutrophil 5.1 1.6 - 8.3 10e9/L    Absolute Lymphocytes 1.4  0.8 - 5.3 10e9/L    Absolute Monocytes 0.5 0.0 - 1.3 10e9/L    Absolute Eosinophils 0.1 0.0 - 0.7 10e9/L    Absolute Basophils 0.0 0.0 - 0.2 10e9/L    Abs Immature Granulocytes 0.0 0 - 0.4 10e9/L    Absolute Nucleated RBC 0.0    Comprehensive metabolic panel   Result Value Ref Range    Sodium 139 133 - 144 mmol/L    Potassium 3.1 (L) 3.4 - 5.3 mmol/L    Chloride 104 94 - 109 mmol/L    Carbon Dioxide 26 20 - 32 mmol/L    Anion Gap 9 3 - 14 mmol/L    Glucose 100 (H) 70 - 99 mg/dL    Urea Nitrogen 7 7 - 30 mg/dL    Creatinine 0.59 0.52 - 1.04 mg/dL    GFR Estimate >90 >60 mL/min/[1.73_m2]    GFR Estimate If Black >90 >60 mL/min/[1.73_m2]    Calcium 9.1 8.5 - 10.1 mg/dL    Bilirubin Total 3.3 (H) 0.2 - 1.3 mg/dL    Albumin 3.3 (L) 3.4 - 5.0 g/dL    Protein Total 8.2 6.8 - 8.8 g/dL    Alkaline Phosphatase 781 (H) 40 - 150 U/L     (HH) 0 - 50 U/L     (H) 0 - 45 U/L   Lipase   Result Value Ref Range    Lipase 237 73 - 393 U/L            Labs Ordered and Resulted from Time of ED Arrival Up to the Time of Departure from the ED - No data to display         Assessments & Plan (with Medical Decision Making)   MDM  Patient presenting with decreased drainage from biliary drain.  Recently had a cholecystectomy with complication of common duct injury.  Patient has no pain here, she is afebrile and appears well.  Minimal right upper quadrant pain.  She does not anything for pain at this point, will reassess.  Interventional radiology paged on arrival.    Re eval: Patient will be admitted to surgery due to MRCP findings of continuing obstruction.  Labs do show elevation of LFTs and including alk phos.  They will discussed with interventional radiology as well.  Patient agrees with this plan.  I have reviewed the nursing notes.    I have reviewed the findings, diagnosis, plan and need for follow up with the patient.       Medication List      ASK your doctor about these medications    oxyCODONE 5 MG tablet  Commonly  known as:  ROXICODONE  5-10 mg, Oral, EVERY 4 HOURS PRN  Ask about: Should I take this medication?            Final diagnoses:   Biliary obstruction     I, Roberto Mueller, am serving as a trained medical scribe to document services personally performed by Rafa Woo MD, based on the provider's statements to me.   IRafa MD, was physically present and have reviewed and verified the accuracy of this note documented by Roberto Mueller.    2/2/2019   [facility], [place], EMERGENCY DEPARTMENT     Rafa Woo MD  02/02/19 4559

## 2019-02-03 LAB
ALBUMIN SERPL-MCNC: 2.8 G/DL (ref 3.4–5)
ALP SERPL-CCNC: 745 U/L (ref 40–150)
ALT SERPL W P-5'-P-CCNC: 433 U/L (ref 0–50)
ANION GAP SERPL CALCULATED.3IONS-SCNC: 7 MMOL/L (ref 3–14)
AST SERPL W P-5'-P-CCNC: 249 U/L (ref 0–45)
BILIRUB SERPL-MCNC: 5.1 MG/DL (ref 0.2–1.3)
BUN SERPL-MCNC: 5 MG/DL (ref 7–30)
CALCIUM SERPL-MCNC: 8.1 MG/DL (ref 8.5–10.1)
CHLORIDE SERPL-SCNC: 104 MMOL/L (ref 94–109)
CO2 SERPL-SCNC: 28 MMOL/L (ref 20–32)
CREAT SERPL-MCNC: 0.56 MG/DL (ref 0.52–1.04)
ERYTHROCYTE [DISTWIDTH] IN BLOOD BY AUTOMATED COUNT: 13.4 % (ref 10–15)
GFR SERPL CREATININE-BSD FRML MDRD: >90 ML/MIN/{1.73_M2}
GLUCOSE SERPL-MCNC: 101 MG/DL (ref 70–99)
HCT VFR BLD AUTO: 35.6 % (ref 35–47)
HGB BLD-MCNC: 11.7 G/DL (ref 11.7–15.7)
INR PPP: 1.09 (ref 0.86–1.14)
MCH RBC QN AUTO: 28.8 PG (ref 26.5–33)
MCHC RBC AUTO-ENTMCNC: 32.9 G/DL (ref 31.5–36.5)
MCV RBC AUTO: 88 FL (ref 78–100)
PLATELET # BLD AUTO: 322 10E9/L (ref 150–450)
POTASSIUM SERPL-SCNC: 3.4 MMOL/L (ref 3.4–5.3)
PROT SERPL-MCNC: 7 G/DL (ref 6.8–8.8)
RBC # BLD AUTO: 4.06 10E12/L (ref 3.8–5.2)
SODIUM SERPL-SCNC: 139 MMOL/L (ref 133–144)
WBC # BLD AUTO: 8.3 10E9/L (ref 4–11)

## 2019-02-03 PROCEDURE — 85027 COMPLETE CBC AUTOMATED: CPT | Performed by: NEUROLOGICAL SURGERY

## 2019-02-03 PROCEDURE — 25800025 ZZH RX 258: Performed by: NEUROLOGICAL SURGERY

## 2019-02-03 PROCEDURE — 80053 COMPREHEN METABOLIC PANEL: CPT | Performed by: NEUROLOGICAL SURGERY

## 2019-02-03 PROCEDURE — 36415 COLL VENOUS BLD VENIPUNCTURE: CPT | Performed by: NEUROLOGICAL SURGERY

## 2019-02-03 PROCEDURE — 12000001 ZZH R&B MED SURG/OB UMMC

## 2019-02-03 PROCEDURE — 25000128 H RX IP 250 OP 636: Performed by: NEUROLOGICAL SURGERY

## 2019-02-03 PROCEDURE — 85610 PROTHROMBIN TIME: CPT | Performed by: NEUROLOGICAL SURGERY

## 2019-02-03 PROCEDURE — 25000132 ZZH RX MED GY IP 250 OP 250 PS 637: Performed by: NEUROLOGICAL SURGERY

## 2019-02-03 RX ADMIN — POTASSIUM CHLORIDE, DEXTROSE MONOHYDRATE AND SODIUM CHLORIDE: 150; 5; 450 INJECTION, SOLUTION INTRAVENOUS at 06:17

## 2019-02-03 RX ADMIN — OXYCODONE HYDROCHLORIDE 5 MG: 5 TABLET ORAL at 01:52

## 2019-02-03 RX ADMIN — Medication 0.2 MG: at 00:27

## 2019-02-03 RX ADMIN — PIPERACILLIN AND TAZOBACTAM 4.5 G: 4; .5 INJECTION, POWDER, FOR SOLUTION INTRAVENOUS at 15:02

## 2019-02-03 RX ADMIN — OXYCODONE HYDROCHLORIDE 5 MG: 5 TABLET ORAL at 02:15

## 2019-02-03 RX ADMIN — POTASSIUM CHLORIDE, DEXTROSE MONOHYDRATE AND SODIUM CHLORIDE: 150; 5; 450 INJECTION, SOLUTION INTRAVENOUS at 17:21

## 2019-02-03 RX ADMIN — PIPERACILLIN AND TAZOBACTAM 4.5 G: 4; .5 INJECTION, POWDER, FOR SOLUTION INTRAVENOUS at 01:53

## 2019-02-03 RX ADMIN — PIPERACILLIN AND TAZOBACTAM 4.5 G: 4; .5 INJECTION, POWDER, FOR SOLUTION INTRAVENOUS at 21:06

## 2019-02-03 RX ADMIN — OXYCODONE HYDROCHLORIDE 5 MG: 5 TABLET ORAL at 19:18

## 2019-02-03 RX ADMIN — PIPERACILLIN AND TAZOBACTAM 4.5 G: 4; .5 INJECTION, POWDER, FOR SOLUTION INTRAVENOUS at 07:03

## 2019-02-03 ASSESSMENT — ACTIVITIES OF DAILY LIVING (ADL)
ADLS_ACUITY_SCORE: 12

## 2019-02-03 NOTE — PROGRESS NOTES
"Surgical Oncology Progress Note    No events; pain controlled     Vitals:  BP 99/59 (BP Location: Left arm)   Pulse 71   Temp 95.5  F (35.3  C) (Oral)   Resp 18   Ht 1.346 m (4' 5\")   Wt 84.7 kg (186 lb 12.8 oz)   LMP  (LMP Unknown)   SpO2 98%   BMI 46.75 kg/m      Physical Exam:  Gen: AAOx3, NAD  Pulm: Non-labored breathing  Abd: Obese, incisions healing without signs of infection; RUQ drain with bilious output    Drain with 50cc out since midnight    Assessment: 38F with cbd injury during lap sheryl on 1/14, s/p attempted ptc tube placement, discharged with surgical drain in place on 1/18, now with occluded duct and intrahepatic biliary dilation    Tbili 5.1 <- 3.3  WBC normal    Plan:  Continue npo, ivf, pain control  Zosyn  Ir consult placed for ptc    Seen, discussed with Dr. James.    Keyshawn Redmond MD  [phone]  [phone]          "

## 2019-02-03 NOTE — PLAN OF CARE
Arrived to floor 7B approximately 1900 today. Speaks Croatian; iPad  used to settle patient.  Chief complain is pain, medicated with PRN oxycodone and dilaudid with good effect. Right side abdominal drain in place, no output, substance in tubing appears thin, clear, green/yellow.  Diet is NPO x meds and ice chips. PIV in right AC infusing IV abx intermittently and IVMF per MAR. Continue POC.

## 2019-02-03 NOTE — PLAN OF CARE
Vital signs:  Temp: 98.3  F (36.8  C) Temp src: Oral BP: 104/66 Heart Rate: 78 Resp: 18 SpO2: 95 % O2 Device: None (Room air)     A&Ox4. Afebrile VSS on RA. LS clear, denies SOB. Pt c/o RUQ pain oxycodone & IV dilaudid given with adequate relief. NPO except for ice chips/meds, denies nausea. MIVF infusing at 100 ml/hr. Zosyn Q 6 hrs. Voiding not saving. Abd soft round, +BS, +flatus, no BM. R side abd drain with 30 ml output. Up ad peggy, independent in room. Pt appears to be resting between cares, uses call light appropriately. Pt is calm & cooperative with cares. Continue POC.

## 2019-02-04 LAB
ALBUMIN SERPL-MCNC: 3 G/DL (ref 3.4–5)
ALP SERPL-CCNC: 673 U/L (ref 40–150)
ALT SERPL W P-5'-P-CCNC: 403 U/L (ref 0–50)
ANION GAP SERPL CALCULATED.3IONS-SCNC: 7 MMOL/L (ref 3–14)
AST SERPL W P-5'-P-CCNC: 201 U/L (ref 0–45)
BILIRUB SERPL-MCNC: 4.6 MG/DL (ref 0.2–1.3)
BUN SERPL-MCNC: 5 MG/DL (ref 7–30)
CALCIUM SERPL-MCNC: 8.7 MG/DL (ref 8.5–10.1)
CHLORIDE SERPL-SCNC: 105 MMOL/L (ref 94–109)
CO2 SERPL-SCNC: 28 MMOL/L (ref 20–32)
CREAT SERPL-MCNC: 0.58 MG/DL (ref 0.52–1.04)
ERYTHROCYTE [DISTWIDTH] IN BLOOD BY AUTOMATED COUNT: 13.5 % (ref 10–15)
GFR SERPL CREATININE-BSD FRML MDRD: >90 ML/MIN/{1.73_M2}
GLUCOSE SERPL-MCNC: 107 MG/DL (ref 70–99)
HCT VFR BLD AUTO: 37.8 % (ref 35–47)
HGB BLD-MCNC: 12.1 G/DL (ref 11.7–15.7)
MAGNESIUM SERPL-MCNC: 1.8 MG/DL (ref 1.6–2.3)
MCH RBC QN AUTO: 28.7 PG (ref 26.5–33)
MCHC RBC AUTO-ENTMCNC: 32 G/DL (ref 31.5–36.5)
MCV RBC AUTO: 90 FL (ref 78–100)
PHOSPHATE SERPL-MCNC: 3.3 MG/DL (ref 2.5–4.5)
PLATELET # BLD AUTO: 327 10E9/L (ref 150–450)
POTASSIUM SERPL-SCNC: 3.6 MMOL/L (ref 3.4–5.3)
PROT SERPL-MCNC: 7.2 G/DL (ref 6.8–8.8)
RBC # BLD AUTO: 4.22 10E12/L (ref 3.8–5.2)
SODIUM SERPL-SCNC: 140 MMOL/L (ref 133–144)
WBC # BLD AUTO: 4.2 10E9/L (ref 4–11)

## 2019-02-04 PROCEDURE — 25800025 ZZH RX 258: Performed by: NEUROLOGICAL SURGERY

## 2019-02-04 PROCEDURE — 25000132 ZZH RX MED GY IP 250 OP 250 PS 637: Performed by: STUDENT IN AN ORGANIZED HEALTH CARE EDUCATION/TRAINING PROGRAM

## 2019-02-04 PROCEDURE — 80053 COMPREHEN METABOLIC PANEL: CPT | Performed by: STUDENT IN AN ORGANIZED HEALTH CARE EDUCATION/TRAINING PROGRAM

## 2019-02-04 PROCEDURE — 83735 ASSAY OF MAGNESIUM: CPT | Performed by: STUDENT IN AN ORGANIZED HEALTH CARE EDUCATION/TRAINING PROGRAM

## 2019-02-04 PROCEDURE — 85027 COMPLETE CBC AUTOMATED: CPT | Performed by: STUDENT IN AN ORGANIZED HEALTH CARE EDUCATION/TRAINING PROGRAM

## 2019-02-04 PROCEDURE — 12000001 ZZH R&B MED SURG/OB UMMC

## 2019-02-04 PROCEDURE — 84100 ASSAY OF PHOSPHORUS: CPT | Performed by: STUDENT IN AN ORGANIZED HEALTH CARE EDUCATION/TRAINING PROGRAM

## 2019-02-04 PROCEDURE — 25000128 H RX IP 250 OP 636: Performed by: NEUROLOGICAL SURGERY

## 2019-02-04 PROCEDURE — 36415 COLL VENOUS BLD VENIPUNCTURE: CPT | Performed by: STUDENT IN AN ORGANIZED HEALTH CARE EDUCATION/TRAINING PROGRAM

## 2019-02-04 PROCEDURE — 25000132 ZZH RX MED GY IP 250 OP 250 PS 637: Performed by: NEUROLOGICAL SURGERY

## 2019-02-04 RX ORDER — ACETAMINOPHEN 325 MG/1
650 TABLET ORAL EVERY 4 HOURS PRN
Status: DISCONTINUED | OUTPATIENT
Start: 2019-02-04 | End: 2019-02-06 | Stop reason: HOSPADM

## 2019-02-04 RX ADMIN — ACETAMINOPHEN 650 MG: 325 TABLET, FILM COATED ORAL at 21:27

## 2019-02-04 RX ADMIN — OXYCODONE HYDROCHLORIDE 5 MG: 5 TABLET ORAL at 07:25

## 2019-02-04 RX ADMIN — PIPERACILLIN AND TAZOBACTAM 4.5 G: 4; .5 INJECTION, POWDER, FOR SOLUTION INTRAVENOUS at 02:54

## 2019-02-04 RX ADMIN — PIPERACILLIN AND TAZOBACTAM 4.5 G: 4; .5 INJECTION, POWDER, FOR SOLUTION INTRAVENOUS at 08:09

## 2019-02-04 RX ADMIN — POTASSIUM CHLORIDE, DEXTROSE MONOHYDRATE AND SODIUM CHLORIDE: 150; 5; 450 INJECTION, SOLUTION INTRAVENOUS at 13:02

## 2019-02-04 RX ADMIN — PIPERACILLIN AND TAZOBACTAM 4.5 G: 4; .5 INJECTION, POWDER, FOR SOLUTION INTRAVENOUS at 19:42

## 2019-02-04 RX ADMIN — ACETAMINOPHEN 650 MG: 325 TABLET, FILM COATED ORAL at 16:19

## 2019-02-04 RX ADMIN — PIPERACILLIN AND TAZOBACTAM 4.5 G: 4; .5 INJECTION, POWDER, FOR SOLUTION INTRAVENOUS at 13:02

## 2019-02-04 RX ADMIN — OXYCODONE HYDROCHLORIDE 5 MG: 5 TABLET ORAL at 00:35

## 2019-02-04 ASSESSMENT — ACTIVITIES OF DAILY LIVING (ADL)
ADLS_ACUITY_SCORE: 12

## 2019-02-04 NOTE — CONSULTS
Patient is on IR schedule 2/5/2019 at 0900 for a PTC with biliary drain placement with offsite anesthesia.   Labs WNL for procedure. HCG quant pending.  Orders for NPO, scrubs and antibiotics have been entered.  Consent will be done prior to procedure.     Please contact the IR charge RN at [phone] for estimated time of procedure.     Case discussed with Dr. León and Dr. Paredes from IR and surg onc team- Dr. Graves. 38 year old [place]ian speaking female with CBD injury during lap sheryl 1/14, s/p attempted PTC on 1/16/2019 failed due to non-dilated nature of biliary system. Pt presented to hospital with abdominal pain and decreased drain output. MRCP from 2/2/19 shows intrahepatic bile ducts centrally. Plan for 2nd attempt at PTC placement with Dr. Paredes on 2/5.    Melody Lerner, ANTONY, APRN  Interventional Radiology  Pager: [phone]

## 2019-02-04 NOTE — PLAN OF CARE
Neuro: alert and oriented, denies numbness and tingling  GI/: voiding not saving  Diet:NPO with ice chips and meds  Incisions/Drains: bile drain to gravity  Activity: up independently  Pain: complained of pain x 1, gave oxycodone with good relief  Plan: continue with plan of care

## 2019-02-04 NOTE — PLAN OF CARE
"/70 (BP Location: Left arm)   Pulse 62   Temp 96.8  F (36  C) (Oral)   Resp 18   Ht 1.346 m (4' 5\")   Wt 84.7 kg (186 lb 12.8 oz)   LMP  (LMP Unknown)   SpO2 96%   BMI 46.75 kg/m       Shift: 1527-9220    Neuro: A&O X 4  Cardiac: WNL  Respiratory: lung sounds clear, 96% on RA  GI/: AUOP not saving, no bm, +bs, +gas  Diet/Appetite: tolerated clears for part of the day, NPO @ midnight for procedure tomorrow  Skin: incisions from OSH CDI  LDA: PTC tube to gravity, output less than 100 mL this shift  Activity: up ad peggy  Pain: c/o pain, gave tylenol X 1 with relief    Plan: IR tomorrow @ 0900    "

## 2019-02-04 NOTE — PROGRESS NOTES
Surgical Oncology Progress Note    Bryanna Taylor  [id number]    No acute overnight events. Pain controlled with current regimen. Passing flatus, no BM. No nausea or emesis. Voiding and ambulating independently.    Temp:  [95.5  F (35.3  C)-97.5  F (36.4  C)] 96.5  F (35.8  C)  Pulse:  [55-62] 55  Heart Rate:  [59-64] 59  Resp:  [16-18] 18  BP: ()/(59-75) 110/66  SpO2:  [96 %-100 %] 96 %    Intake/Output Summary (Last 24 hours) at 2/4/2019 0849  Last data filed at 2/3/2019 2100  Gross per 24 hour   Intake 0 ml   Output 100 ml   Net -100 ml     NAD, resting comfortably  non-labored breathing on room air  Soft, mildly tender, incisions CDI without erythema/drainage  RUQ drain with bilious output  alert, oriented    Labs for a.m. pending  Tbili 3.3 -> 5.1 yesterday  WBC 8.3    38 year old Kosovan speaking female with CBD injury during lap sheryl on 1/14, s/p attempted ptc tube placement, discharged with surgical drain in place on 1/18, now with occluded duct and intrahepatic biliary dilation.    NPO for procedure, maintenance IVF  Zosyn for antibiotic coverage  IR consult for PTC     Seen with staff    Caren Graves MD  General Surgery PGY-3

## 2019-02-04 NOTE — PLAN OF CARE
VSS. Afebrile. Pain controlled. Up ad peggy. Voiding not saving. NPO. Abdominal drain with serous drainage. Dressing changed. Lap sites intact.  in during assessments. All questions answered. Pt offered ipad  services as needed. Offers no complaints at this time. Will continue with POC.

## 2019-02-04 NOTE — CONSULTS
IR Brief Consult Note    Our team was contacted about a non-urgent PTC. Consult service to review on Monday morning. Discussed with Dr. Villarreal.    Jeremias Weber MD  IR Fellow.

## 2019-02-04 NOTE — DISCHARGE INSTRUCTIONS
________________________________________________________  Discharge RN please fax discharge orders to home care agency: [facility]  ________________________________________________________

## 2019-02-04 NOTE — CONSULTS
Consult Date:  2019      HISTORY OF PRESENT ILLNESS:  Bryanna Taylor is a 38-year-old woman who presented to the emergency room with increased abdominal pain.  On  she had a laparoscopically, but had an intraoperative bile duct injury.  She had a drain placed and was transferred and was under the care of Dr. Michael Morillo.  Her imaging at that time showed an injury at the level of the cystic duct.  At that time, they tried to perform a percutaneous transhepatic cholangiography, but the ducts were not dilated yet.  So she was discharged to home but is going to follow up with Dr. Morillo this week; however, she had increased abdominal pain and no output from her drain.  She did not have any nausea, vomiting or chills.  She has not noticed any change in the color of her stool or eyes.      PAST MEDICAL HISTORY:  Significant for no other major medical problems.      PHYSICAL EXAMINATION:     GENERAL:  On physical examination, she is not acutely ill.  She has no evidence of jaundice.  VITAL SIGNS:   She is afebrile.  Vital signs are stable vital signs.     ABDOMEN: She has a healed midline incision from her laparoscopy.  A drain is in place and does have a small amount of bilious output.  She does not have any evidence of deep tenderness.        LABORATORY DATA:   Her labs on admission showed a white count of 7.2, total bilirubin of 3.3.        PLAN:  Plan is to admit her to the hospital to the Surgical Oncology Service.  We will obtain an MRCP and continue her antibiotics.         MARIA DE JESUS OSBORNE MD             D: 2019   T: 2019   MT: RONDA      Name:     BRYANNA SIMON   MRN:      [id number]        Account:       [id number]   :      1980           Consult Date:  2019      Document: [id number]       cc: [facility] Surgery Billing

## 2019-02-05 ENCOUNTER — ANESTHESIA EVENT (OUTPATIENT)
Dept: SURGERY | Facility: CLINIC | Age: 39
DRG: 446 | End: 2019-02-05
Payer: COMMERCIAL

## 2019-02-05 ENCOUNTER — APPOINTMENT (OUTPATIENT)
Dept: INTERVENTIONAL RADIOLOGY/VASCULAR | Facility: CLINIC | Age: 39
DRG: 446 | End: 2019-02-05
Payer: COMMERCIAL

## 2019-02-05 ENCOUNTER — ANESTHESIA (OUTPATIENT)
Dept: SURGERY | Facility: CLINIC | Age: 39
DRG: 446 | End: 2019-02-05
Payer: COMMERCIAL

## 2019-02-05 LAB
ALBUMIN SERPL-MCNC: 3 G/DL (ref 3.4–5)
ALP SERPL-CCNC: 621 U/L (ref 40–150)
ALT SERPL W P-5'-P-CCNC: 414 U/L (ref 0–50)
ANION GAP SERPL CALCULATED.3IONS-SCNC: 8 MMOL/L (ref 3–14)
AST SERPL W P-5'-P-CCNC: 224 U/L (ref 0–45)
B-HCG SERPL-ACNC: <1 IU/L (ref 0–5)
BILIRUB SERPL-MCNC: 4.4 MG/DL (ref 0.2–1.3)
BUN SERPL-MCNC: 3 MG/DL (ref 7–30)
CALCIUM SERPL-MCNC: 8.8 MG/DL (ref 8.5–10.1)
CHLORIDE SERPL-SCNC: 105 MMOL/L (ref 94–109)
CO2 SERPL-SCNC: 28 MMOL/L (ref 20–32)
CREAT SERPL-MCNC: 0.61 MG/DL (ref 0.52–1.04)
ERYTHROCYTE [DISTWIDTH] IN BLOOD BY AUTOMATED COUNT: 13.6 % (ref 10–15)
GFR SERPL CREATININE-BSD FRML MDRD: >90 ML/MIN/{1.73_M2}
GLUCOSE SERPL-MCNC: 101 MG/DL (ref 70–99)
GRAM STN SPEC: NORMAL
GRAM STN SPEC: NORMAL
HCT VFR BLD AUTO: 35 % (ref 35–47)
HGB BLD-MCNC: 11.4 G/DL (ref 11.7–15.7)
HGB BLD-MCNC: 11.8 G/DL (ref 11.7–15.7)
MAGNESIUM SERPL-MCNC: 1.8 MG/DL (ref 1.6–2.3)
MCH RBC QN AUTO: 28.6 PG (ref 26.5–33)
MCHC RBC AUTO-ENTMCNC: 32.6 G/DL (ref 31.5–36.5)
MCV RBC AUTO: 88 FL (ref 78–100)
PHOSPHATE SERPL-MCNC: 3.6 MG/DL (ref 2.5–4.5)
PLATELET # BLD AUTO: 308 10E9/L (ref 150–450)
POTASSIUM SERPL-SCNC: 3.6 MMOL/L (ref 3.4–5.3)
PROT SERPL-MCNC: 7 G/DL (ref 6.8–8.8)
RBC # BLD AUTO: 3.98 10E12/L (ref 3.8–5.2)
SODIUM SERPL-SCNC: 142 MMOL/L (ref 133–144)
SPECIMEN SOURCE: NORMAL
WBC # BLD AUTO: 3.9 10E9/L (ref 4–11)

## 2019-02-05 PROCEDURE — 47542 DILATE BILIARY DUCT/AMPULLA: CPT

## 2019-02-05 PROCEDURE — 25000128 H RX IP 250 OP 636: Performed by: NEUROLOGICAL SURGERY

## 2019-02-05 PROCEDURE — 0F9530Z DRAINAGE OF RIGHT HEPATIC DUCT WITH DRAINAGE DEVICE, PERCUTANEOUS APPROACH: ICD-10-PCS | Performed by: RADIOLOGY

## 2019-02-05 PROCEDURE — 27210805 ZZH SHEATH CR4

## 2019-02-05 PROCEDURE — 25000128 H RX IP 250 OP 636: Performed by: NURSE PRACTITIONER

## 2019-02-05 PROCEDURE — 84702 CHORIONIC GONADOTROPIN TEST: CPT | Performed by: NURSE PRACTITIONER

## 2019-02-05 PROCEDURE — 25000566 ZZH SEVOFLURANE, EA 15 MIN

## 2019-02-05 PROCEDURE — 87070 CULTURE OTHR SPECIMN AEROBIC: CPT | Performed by: SURGERY

## 2019-02-05 PROCEDURE — 85018 HEMOGLOBIN: CPT | Performed by: STUDENT IN AN ORGANIZED HEALTH CARE EDUCATION/TRAINING PROGRAM

## 2019-02-05 PROCEDURE — 36415 COLL VENOUS BLD VENIPUNCTURE: CPT | Performed by: STUDENT IN AN ORGANIZED HEALTH CARE EDUCATION/TRAINING PROGRAM

## 2019-02-05 PROCEDURE — 25000128 H RX IP 250 OP 636: Performed by: NURSE ANESTHETIST, CERTIFIED REGISTERED

## 2019-02-05 PROCEDURE — 25000125 ZZHC RX 250: Performed by: RADIOLOGY

## 2019-02-05 PROCEDURE — 12000001 ZZH R&B MED SURG/OB UMMC

## 2019-02-05 PROCEDURE — 25500064 ZZH RX 255 OP 636: Performed by: RADIOLOGY

## 2019-02-05 PROCEDURE — 27210732 ZZH ACCESSORY CR1

## 2019-02-05 PROCEDURE — 87075 CULTR BACTERIA EXCEPT BLOOD: CPT | Performed by: SURGERY

## 2019-02-05 PROCEDURE — C1769 GUIDE WIRE: HCPCS

## 2019-02-05 PROCEDURE — 25000132 ZZH RX MED GY IP 250 OP 250 PS 637: Performed by: STUDENT IN AN ORGANIZED HEALTH CARE EDUCATION/TRAINING PROGRAM

## 2019-02-05 PROCEDURE — 37000009 ZZH ANESTHESIA TECHNICAL FEE, EACH ADDTL 15 MIN

## 2019-02-05 PROCEDURE — 47533 PLMT BILIARY DRAINAGE CATH: CPT

## 2019-02-05 PROCEDURE — 36415 COLL VENOUS BLD VENIPUNCTURE: CPT | Performed by: NURSE PRACTITIONER

## 2019-02-05 PROCEDURE — 25000128 H RX IP 250 OP 636: Performed by: ANESTHESIOLOGY

## 2019-02-05 PROCEDURE — C1729 CATH, DRAINAGE: HCPCS

## 2019-02-05 PROCEDURE — 40000170 ZZH STATISTIC PRE-PROCEDURE ASSESSMENT II

## 2019-02-05 PROCEDURE — 25000132 ZZH RX MED GY IP 250 OP 250 PS 637: Performed by: NEUROLOGICAL SURGERY

## 2019-02-05 PROCEDURE — 25000128 H RX IP 250 OP 636: Performed by: STUDENT IN AN ORGANIZED HEALTH CARE EDUCATION/TRAINING PROGRAM

## 2019-02-05 PROCEDURE — 37000008 ZZH ANESTHESIA TECHNICAL FEE, 1ST 30 MIN

## 2019-02-05 PROCEDURE — 27210905 ZZH KIT CR7

## 2019-02-05 PROCEDURE — C1887 CATHETER, GUIDING: HCPCS

## 2019-02-05 PROCEDURE — 71000016 ZZH RECOVERY PHASE 1 LEVEL 3 FIRST HR

## 2019-02-05 PROCEDURE — 80053 COMPREHEN METABOLIC PANEL: CPT | Performed by: STUDENT IN AN ORGANIZED HEALTH CARE EDUCATION/TRAINING PROGRAM

## 2019-02-05 PROCEDURE — 87205 SMEAR GRAM STAIN: CPT | Performed by: SURGERY

## 2019-02-05 PROCEDURE — 27210740 ZZH ACCESSORY CR3

## 2019-02-05 PROCEDURE — 27210912 ZZH NEEDLE CR8

## 2019-02-05 PROCEDURE — 84100 ASSAY OF PHOSPHORUS: CPT | Performed by: STUDENT IN AN ORGANIZED HEALTH CARE EDUCATION/TRAINING PROGRAM

## 2019-02-05 PROCEDURE — 85027 COMPLETE CBC AUTOMATED: CPT | Performed by: STUDENT IN AN ORGANIZED HEALTH CARE EDUCATION/TRAINING PROGRAM

## 2019-02-05 PROCEDURE — 25000125 ZZHC RX 250: Performed by: NURSE ANESTHETIST, CERTIFIED REGISTERED

## 2019-02-05 PROCEDURE — 00000146 ZZHCL STATISTIC GLUCOSE BY METER IP

## 2019-02-05 PROCEDURE — 83735 ASSAY OF MAGNESIUM: CPT | Performed by: STUDENT IN AN ORGANIZED HEALTH CARE EDUCATION/TRAINING PROGRAM

## 2019-02-05 RX ORDER — LIDOCAINE HYDROCHLORIDE 10 MG/ML
1-30 INJECTION, SOLUTION EPIDURAL; INFILTRATION; INTRACAUDAL; PERINEURAL
Status: COMPLETED | OUTPATIENT
Start: 2019-02-05 | End: 2019-02-05

## 2019-02-05 RX ORDER — PIPERACILLIN SODIUM, TAZOBACTAM SODIUM 3; .375 G/15ML; G/15ML
3.38 INJECTION, POWDER, LYOPHILIZED, FOR SOLUTION INTRAVENOUS EVERY 6 HOURS
Status: DISCONTINUED | OUTPATIENT
Start: 2019-02-05 | End: 2019-02-06

## 2019-02-05 RX ORDER — DEXTROSE MONOHYDRATE 25 G/50ML
25-50 INJECTION, SOLUTION INTRAVENOUS
Status: DISCONTINUED | OUTPATIENT
Start: 2019-02-05 | End: 2019-02-06 | Stop reason: HOSPADM

## 2019-02-05 RX ORDER — IODIXANOL 320 MG/ML
100 INJECTION, SOLUTION INTRAVASCULAR ONCE
Status: COMPLETED | OUTPATIENT
Start: 2019-02-05 | End: 2019-02-05

## 2019-02-05 RX ORDER — ONDANSETRON 4 MG/1
4 TABLET, ORALLY DISINTEGRATING ORAL EVERY 30 MIN PRN
Status: DISCONTINUED | OUTPATIENT
Start: 2019-02-05 | End: 2019-02-05 | Stop reason: HOSPADM

## 2019-02-05 RX ORDER — NALOXONE HYDROCHLORIDE 0.4 MG/ML
.1-.4 INJECTION, SOLUTION INTRAMUSCULAR; INTRAVENOUS; SUBCUTANEOUS
Status: DISCONTINUED | OUTPATIENT
Start: 2019-02-05 | End: 2019-02-05 | Stop reason: HOSPADM

## 2019-02-05 RX ORDER — DEXAMETHASONE SODIUM PHOSPHATE 4 MG/ML
INJECTION, SOLUTION INTRA-ARTICULAR; INTRALESIONAL; INTRAMUSCULAR; INTRAVENOUS; SOFT TISSUE PRN
Status: DISCONTINUED | OUTPATIENT
Start: 2019-02-05 | End: 2019-02-05

## 2019-02-05 RX ORDER — ONDANSETRON 2 MG/ML
4 INJECTION INTRAMUSCULAR; INTRAVENOUS EVERY 30 MIN PRN
Status: DISCONTINUED | OUTPATIENT
Start: 2019-02-05 | End: 2019-02-05 | Stop reason: HOSPADM

## 2019-02-05 RX ORDER — PROPOFOL 10 MG/ML
INJECTION, EMULSION INTRAVENOUS PRN
Status: DISCONTINUED | OUTPATIENT
Start: 2019-02-05 | End: 2019-02-05

## 2019-02-05 RX ORDER — AMPICILLIN AND SULBACTAM 2; 1 G/1; G/1
3 INJECTION, POWDER, FOR SOLUTION INTRAMUSCULAR; INTRAVENOUS
Status: COMPLETED | OUTPATIENT
Start: 2019-02-05 | End: 2019-02-05

## 2019-02-05 RX ORDER — LIDOCAINE 40 MG/G
CREAM TOPICAL
Status: DISCONTINUED | OUTPATIENT
Start: 2019-02-05 | End: 2019-02-05 | Stop reason: HOSPADM

## 2019-02-05 RX ORDER — FENTANYL CITRATE 50 UG/ML
25-50 INJECTION, SOLUTION INTRAMUSCULAR; INTRAVENOUS EVERY 5 MIN PRN
Status: DISCONTINUED | OUTPATIENT
Start: 2019-02-05 | End: 2019-02-05 | Stop reason: HOSPADM

## 2019-02-05 RX ORDER — HYDROMORPHONE HYDROCHLORIDE 1 MG/ML
.3-.5 INJECTION, SOLUTION INTRAMUSCULAR; INTRAVENOUS; SUBCUTANEOUS EVERY 10 MIN PRN
Status: DISCONTINUED | OUTPATIENT
Start: 2019-02-05 | End: 2019-02-05 | Stop reason: HOSPADM

## 2019-02-05 RX ORDER — MEPERIDINE HYDROCHLORIDE 50 MG/ML
12.5 INJECTION INTRAMUSCULAR; INTRAVENOUS; SUBCUTANEOUS
Status: DISCONTINUED | OUTPATIENT
Start: 2019-02-05 | End: 2019-02-05 | Stop reason: HOSPADM

## 2019-02-05 RX ORDER — FENTANYL CITRATE 50 UG/ML
INJECTION, SOLUTION INTRAMUSCULAR; INTRAVENOUS PRN
Status: DISCONTINUED | OUTPATIENT
Start: 2019-02-05 | End: 2019-02-05

## 2019-02-05 RX ORDER — SODIUM CHLORIDE, SODIUM LACTATE, POTASSIUM CHLORIDE, CALCIUM CHLORIDE 600; 310; 30; 20 MG/100ML; MG/100ML; MG/100ML; MG/100ML
INJECTION, SOLUTION INTRAVENOUS CONTINUOUS
Status: DISCONTINUED | OUTPATIENT
Start: 2019-02-05 | End: 2019-02-05 | Stop reason: HOSPADM

## 2019-02-05 RX ORDER — LIDOCAINE HYDROCHLORIDE 20 MG/ML
INJECTION, SOLUTION INFILTRATION; PERINEURAL PRN
Status: DISCONTINUED | OUTPATIENT
Start: 2019-02-05 | End: 2019-02-05

## 2019-02-05 RX ORDER — NICOTINE POLACRILEX 4 MG
15-30 LOZENGE BUCCAL
Status: DISCONTINUED | OUTPATIENT
Start: 2019-02-05 | End: 2019-02-06 | Stop reason: HOSPADM

## 2019-02-05 RX ADMIN — AMPICILLIN SODIUM AND SULBACTAM SODIUM 3 G: 2; 1 INJECTION, POWDER, FOR SOLUTION INTRAMUSCULAR; INTRAVENOUS at 09:51

## 2019-02-05 RX ADMIN — LIDOCAINE HYDROCHLORIDE 100 MG: 20 INJECTION, SOLUTION INFILTRATION; PERINEURAL at 09:34

## 2019-02-05 RX ADMIN — FENTANYL CITRATE 50 MCG: 50 INJECTION, SOLUTION INTRAMUSCULAR; INTRAVENOUS at 10:20

## 2019-02-05 RX ADMIN — SUGAMMADEX 200 MG: 100 INJECTION, SOLUTION INTRAVENOUS at 10:58

## 2019-02-05 RX ADMIN — ACETAMINOPHEN 650 MG: 325 TABLET, FILM COATED ORAL at 13:01

## 2019-02-05 RX ADMIN — PROPOFOL 70 MG: 10 INJECTION, EMULSION INTRAVENOUS at 09:34

## 2019-02-05 RX ADMIN — FENTANYL CITRATE 25 MCG: 50 INJECTION, SOLUTION INTRAMUSCULAR; INTRAVENOUS at 11:14

## 2019-02-05 RX ADMIN — FENTANYL CITRATE 50 MCG: 50 INJECTION, SOLUTION INTRAMUSCULAR; INTRAVENOUS at 09:33

## 2019-02-05 RX ADMIN — PROPOFOL 30 MG: 10 INJECTION, EMULSION INTRAVENOUS at 10:20

## 2019-02-05 RX ADMIN — FENTANYL CITRATE 25 MCG: 50 INJECTION, SOLUTION INTRAMUSCULAR; INTRAVENOUS at 11:43

## 2019-02-05 RX ADMIN — ROCURONIUM BROMIDE 10 MG: 10 INJECTION INTRAVENOUS at 09:50

## 2019-02-05 RX ADMIN — OXYCODONE HYDROCHLORIDE 5 MG: 5 TABLET ORAL at 16:16

## 2019-02-05 RX ADMIN — ACETAMINOPHEN 650 MG: 325 TABLET, FILM COATED ORAL at 01:49

## 2019-02-05 RX ADMIN — HYDROMORPHONE HYDROCHLORIDE 0.3 MG: 1 INJECTION, SOLUTION INTRAMUSCULAR; INTRAVENOUS; SUBCUTANEOUS at 11:57

## 2019-02-05 RX ADMIN — MIDAZOLAM 2 MG: 1 INJECTION INTRAMUSCULAR; INTRAVENOUS at 09:21

## 2019-02-05 RX ADMIN — ROCURONIUM BROMIDE 40 MG: 10 INJECTION INTRAVENOUS at 09:35

## 2019-02-05 RX ADMIN — PIPERACILLIN SODIUM,TAZOBACTAM SODIUM 3.38 G: 3; .375 INJECTION, POWDER, FOR SOLUTION INTRAVENOUS at 22:22

## 2019-02-05 RX ADMIN — PIPERACILLIN AND TAZOBACTAM 4.5 G: 4; .5 INJECTION, POWDER, FOR SOLUTION INTRAVENOUS at 01:47

## 2019-02-05 RX ADMIN — DEXAMETHASONE SODIUM PHOSPHATE 4 MG: 4 INJECTION, SOLUTION INTRA-ARTICULAR; INTRALESIONAL; INTRAMUSCULAR; INTRAVENOUS; SOFT TISSUE at 09:47

## 2019-02-05 RX ADMIN — FENTANYL CITRATE 50 MCG: 50 INJECTION, SOLUTION INTRAMUSCULAR; INTRAVENOUS at 10:16

## 2019-02-05 RX ADMIN — SODIUM CHLORIDE, POTASSIUM CHLORIDE, SODIUM LACTATE AND CALCIUM CHLORIDE: 600; 310; 30; 20 INJECTION, SOLUTION INTRAVENOUS at 09:21

## 2019-02-05 RX ADMIN — PIPERACILLIN SODIUM,TAZOBACTAM SODIUM 3.38 G: 3; .375 INJECTION, POWDER, FOR SOLUTION INTRAVENOUS at 16:17

## 2019-02-05 RX ADMIN — OXYCODONE HYDROCHLORIDE 5 MG: 5 TABLET ORAL at 13:01

## 2019-02-05 RX ADMIN — FENTANYL CITRATE 25 MCG: 50 INJECTION, SOLUTION INTRAMUSCULAR; INTRAVENOUS at 11:29

## 2019-02-05 RX ADMIN — IODIXANOL 20 ML: 320 INJECTION, SOLUTION INTRAVASCULAR at 11:03

## 2019-02-05 RX ADMIN — ROCURONIUM BROMIDE 20 MG: 10 INJECTION INTRAVENOUS at 10:00

## 2019-02-05 RX ADMIN — LIDOCAINE HYDROCHLORIDE 7 ML: 10 INJECTION, SOLUTION EPIDURAL; INFILTRATION; INTRACAUDAL; PERINEURAL at 10:10

## 2019-02-05 RX ADMIN — ONDANSETRON 4 MG: 2 INJECTION INTRAMUSCULAR; INTRAVENOUS at 10:50

## 2019-02-05 ASSESSMENT — ACTIVITIES OF DAILY LIVING (ADL)
DRESS: 0-->INDEPENDENT
COGNITION: 0 - NO COGNITION ISSUES REPORTED
RETIRED_COMMUNICATION: 0-->UNDERSTANDS/COMMUNICATES WITHOUT DIFFICULTY
TOILETING: 0-->INDEPENDENT
ADLS_ACUITY_SCORE: 12
AMBULATION: 0-->INDEPENDENT
BATHING: 0-->INDEPENDENT
ADLS_ACUITY_SCORE: 12
SWALLOWING: 0-->SWALLOWS FOODS/LIQUIDS WITHOUT DIFFICULTY
FALL_HISTORY_WITHIN_LAST_SIX_MONTHS: NO
RETIRED_EATING: 0-->INDEPENDENT
TRANSFERRING: 0-->INDEPENDENT

## 2019-02-05 ASSESSMENT — ENCOUNTER SYMPTOMS
SEIZURES: 0
DYSRHYTHMIAS: 0

## 2019-02-05 ASSESSMENT — LIFESTYLE VARIABLES: TOBACCO_USE: 0

## 2019-02-05 NOTE — PROGRESS NOTES
Surgical Oncology Progress Note    Bryanna Taylor  [id number]    No acute overnight events. Minimal pain, controlled. Tolerating clear liquids, no nausea or emesis. Passing flatus. Voiding and ambulating independently.    Temp:  [96.4  F (35.8  C)-99.7  F (37.6  C)] 96.4  F (35.8  C)  Pulse:  [55-80] 61  Resp:  [16-18] 16  BP: (108-134)/(59-79) 131/79  SpO2:  [94 %-98 %] 98 %    Intake/Output Summary (Last 24 hours) at 2/5/2019 0636  Last data filed at 2/5/2019 0416  Gross per 24 hour   Intake 1680 ml   Output 137 ml   Net 1543 ml     NAD, resting comfortably  non-labored breathing on room air  Soft, non tender, incisions CDI without erythema/drainage  RUQ drain with scant bilious output  alert, oriented, non focal     Labs for a.m. pending  Tbili 4.6 yesterday  WBC 4.2     38 year old [place] speaking female with CBD injury during lap sheryl on 1/14, s/p attempted ptc tube placement, discharged with surgical drain in place on 1/18, now with occluded duct and intrahepatic biliary dilation.     IR consulted for PTC - planning to perform this a.m.  PRN PO and IV pain medications  NPO for procedure, maintenance IVF  Zosyn for antibiotic coverage    Seen with chief who will discuss with staff.     Caren Graves MD  General Surgery PGY-3

## 2019-02-05 NOTE — PLAN OF CARE
"/76 (BP Location: Left arm)   Pulse 58   Temp 96.4  F (35.8  C) (Oral)   Resp 22   Ht 1.346 m (4' 5\")   Wt 84.7 kg (186 lb 12.8 oz)   LMP  (LMP Unknown)   SpO2 97%   BMI 46.75 kg/m       Pt arrived to unit from PACU approx 1300    Neuro: A&O X 4  Cardiac: WNL  Respiratory: lung sounds clear, 97% on 2L NC  GI/: AUOP from cordero, no bm, +bs, +gas  Diet/Appetite: tolerating clear liquid diet  Skin: incisions and drain dressings CDI  LDA: R and L drains to gravity, #1 drain irrigated per MAR  Pain: c/o pain, oxy given X 1 and acetaminophen X 1 with relief  New changes this shift: drain placed by OR    Plan: Continue to progress towards baseline, monitor and follow POC    "

## 2019-02-05 NOTE — PROCEDURES
Interventional Radiology Brief Post Procedure Note    Procedure: Percutaneous transhepatic cholangiography with external biliary drain placement    Proceduralist: Pete Paredes MD    Assistant: None    Time Out: Prior to the start of the procedure and with procedural staff participation, I verbally confirmed the patient s identity using two indicators, relevant allergies, that the procedure was appropriate and matched the consent or emergent situation, and that the correct equipment/implants were available. Immediately prior to starting the procedure I conducted the Time Out with the procedural staff and re-confirmed the patient s name, procedure, and site/side. (The Joint Commission universal protocol was followed.)  Yes        Sedation: General Endotracheal Anesthesia (GET) administered and documented by Anesthesia Care Provider    Findings: Image guided placement of external biliary drain from left biliary ducts to right bilary ducts    Estimated Blood Loss: Minimal    Fluoroscopy Time:  minute(s)    SPECIMENS: None    Complications: 1. None     Condition: Stable    Plan: Follow-up per primary team    Comments: See dictated procedure note for full details.    PERLA Espinosa CNP

## 2019-02-05 NOTE — ANESTHESIA PREPROCEDURE EVALUATION
Anesthesia Pre-Procedure Evaluation    Patient: Bryanna Taylor   MRN:     [id number] Gender:   female   Age:    38 year old :      1980        Preoperative Diagnosis: Biliary Obstruction   Procedure(s):  ANESTHESIA OUT OF OR Percutaneous Transhepatic Cholangiography     History reviewed. No pertinent past medical history.   Past Surgical History:   Procedure Laterality Date     ENDOSCOPIC RETROGRADE CHOLANGIOPANCREATOGRAM N/A 1/15/2019    Procedure: ENDOSCOPIC RETROGRADE CHOLANGIOPANCREATOGRAM, biliary sphincterotomy;  Surgeon: Ryan Costa MD;  Location: [facility] OR     IR BILIARY DRAIN PLACEMENT  2019          Anesthesia Evaluation     . Pt has had prior anesthetic. Type: General    No history of anesthetic complications          ROS/MED HX    ENT/Pulmonary:  - neg pulmonary ROS    (-) tobacco use and asthma   Neurologic:  - neg neurologic ROS    (-) seizures   Cardiovascular:  - neg cardiovascular ROS      (-) arrhythmias and irregular heartbeat/palpitations   METS/Exercise Tolerance:  4 - Raking leaves, gardening   Hematologic:         Musculoskeletal:         GI/Hepatic:     (+) cholecystitis/cholelithiasis (c/b bile duct injury),      (-) GERD   Renal/Genitourinary:  - ROS Renal section negative    (-) renal disease   Endo:         Psychiatric:         Infectious Disease:         Malignancy:         Other:                         PHYSICAL EXAM:   Mental Status/Neuro: A/A/O   Airway: Facies: Feasible  Mallampati: I  Mouth/Opening: Full  TM distance: > 6 cm  Neck ROM: Full   Respiratory: Auscultation: CTAB     Resp. Rate: Normal     Resp. Effort: Normal      CV: Rhythm: Regular  Rate: Age appropriate  Heart: Normal Sounds   Comments:      Dental: Normal                  Lab Results   Component Value Date    WBC 4.2 2019    HGB 12.1 2019    HCT 37.8 2019     2019     2019    POTASSIUM 3.6 2019    CHLORIDE 105 2019    CO2 28 2019     "BUN 5 (L) 02/04/2019    CR 0.58 02/04/2019     (H) 02/04/2019    AZEB 8.7 02/04/2019    PHOS 3.3 02/04/2019    MAG 1.8 02/04/2019    ALBUMIN 3.0 (L) 02/04/2019    PROTTOTAL 7.2 02/04/2019     (H) 02/04/2019     (H) 02/04/2019    ALKPHOS 673 (H) 02/04/2019    BILITOTAL 4.6 (H) 02/04/2019    LIPASE 237 02/02/2019    INR 1.09 02/03/2019    HCGS Negative 01/16/2019       Preop Vitals  BP Readings from Last 3 Encounters:   02/05/19 129/69   01/18/19 140/85    Pulse Readings from Last 3 Encounters:   02/05/19 61   01/18/19 64      Resp Readings from Last 3 Encounters:   02/05/19 14   01/18/19 18    SpO2 Readings from Last 3 Encounters:   02/05/19 98%   01/18/19 98%      Temp Readings from Last 1 Encounters:   02/05/19 36.7  C (98.1  F) (Oral)    Ht Readings from Last 1 Encounters:   02/02/19 1.346 m (4' 5\")      Wt Readings from Last 1 Encounters:   02/02/19 84.7 kg (186 lb 12.8 oz)    Estimated body mass index is 46.75 kg/m  as calculated from the following:    Height as of this encounter: 1.346 m (4' 5\").    Weight as of this encounter: 84.7 kg (186 lb 12.8 oz).     LDA:  Peripheral IV 02/02/19 Right Lower forearm (Active)   Site Assessment WDL 2/4/2019  8:00 PM   Line Status Infusing 2/4/2019  8:00 PM   Phlebitis Scale 0-->no symptoms 2/4/2019  8:00 PM   Infiltration Scale 0 2/4/2019  8:00 PM   Infiltration Site Treatment Method  None 2/4/2019  8:00 PM   Extravasation? No 2/4/2019  8:00 PM   Number of days: 3       Closed/Suction Drain 1 Right Abdomen Bulb (Active)   Site Description WD 2/4/2019  8:00 PM   Dressing Status Normal: Clean, Dry & Intact 2/4/2019  8:00 PM   Drainage Appearance Yellow 2/4/2019  8:00 PM   Status Open to gravity drainage 2/4/2019  8:00 PM   Output (ml) 50 ml 2/5/2019  4:16 AM   Number of days: 22            Assessment:   ASA SCORE: 2       Documentation: H&P complete; Preop Testing complete; Consents complete   Proceeding: Proceed without further delay  Tobacco Use:  " Active user of Tobacco     Plan:   Anes. Type:  General   Pre-Induction: Midazolam IV; Acetaminophen PO   Induction:  IV (Standard)   Airway: Oral ETT   Access/Monitoring: PIV   Maintenance: Balanced   Emergence: Procedure Site   Logistics: Same Day Surgery     Postop Pain/Sedation Strategy:  Standard-Options: Opioids PRN     PONV Management:  Adult Risk Factors: Female, Postop Opioids  Prevention: Ondansetron; Dexamethasone     CONSENT: Direct conversation   Plan and risks discussed with: Patient   Blood Products: Consented (ALL Blood Products)                         Tomas Lainez MD

## 2019-02-05 NOTE — PROGRESS NOTES
Interventional Radiology Intra-procedural Nursing Note    Patient Name: Bryanna Taylor  Medical Record Number: [id number]  Today's Date: February 5, 2019         Start Time: 1010  Procedure: Percutaneous transhepatic cholangiography with possible drain placement  Fire Safety Score: 1    Consent Review/Timeout Performed by: Dr. Pete Paredes   Procedure Performed By: Dr. Pete Paredes    Procedure start time: 1010  Puncture time: 1010  Procedure end time: 1100    Other Notes:  Alert female transported via cart from  with anesthesia escort to IR Procedure Room 1 for planned intervention.  ID band confirmed.  Patient repositioned to procedure table via hover-mat and positioned supine.  Airway management an d hemodynamic monitoring provided by Anesthesia Staff  Patient prepped and draped per policy see VS flowsheet, MAR for further information.   Longo placed pre-procedure per IR MD order.      Case performed.  For complete details, please see Providers procedure note from event.    Patient condition post procedure is stable.   Patient returned to  with Anesthesia escort for post-procedure monitoring and continuation of care.    Laquita Borden RN

## 2019-02-05 NOTE — ANESTHESIA POSTPROCEDURE EVALUATION
Anesthesia POST Procedure Evaluation    Patient: Bryanna Taylor   MRN:     [id number] Gender:   female   Age:    38 year old :      1980        Preoperative Diagnosis: Biliary Obstruction   Procedure(s):  ANESTHESIA OUT OF OR Percutaneous Transhepatic Cholangiography   Postop Comments: No value filed.       Anesthesia Type:  General    Reportable Event: NO     PAIN: Uncomplicated   Sign Out status: Comfortable, Well controlled pain     PONV: No PONV   Sign Out status:  No Nausea or Vomiting     Neuro/Psych: Uneventful perioperative course   Sign Out Status: Preoperative baseline; Age appropriate mentation     Airway/Resp.: Uneventful perioperative course   Sign Out Status: Non labored breathing, age appropriate RR; Resp. Status within EXPECTED Parameters     CV: Uneventful perioperative course   Sign Out status: Appropriate BP and perfusion indices; Appropriate HR/Rhythm     Disposition:   Sign Out in:  PACU  Disposition:  Floor  Recovery Course: Uneventful  Follow-Up: Not required           Last Anesthesia Record Vitals:  CRNA VITALS  2019 1040 - 2019 1140      2019             Resp Rate (observed):  2  (Abnormal)           Last PACU/Preop Vitals:  Vitals:    19 1130 19 1145 19 1200   BP: 158/82 158/85 149/76   Pulse:   60   Resp: 20 20 20   Temp:  36.6  C (97.8  F) 36.6  C (97.9  F)   SpO2: 99% 96% 96%         Electronically Signed By: Tomas Lainez MD, 2019, 12:08 PM

## 2019-02-05 NOTE — ANESTHESIA CARE TRANSFER NOTE
Patient: Bryanna Taylor    Procedure(s):  ANESTHESIA OUT OF OR Percutaneous Transhepatic Cholangiography    Diagnosis: Biliary Obstruction  Diagnosis Additional Information: No value filed.    Anesthesia Type:   No value filed.     Note:  Airway :Face Mask  Patient transferred to:PACU  Comments: Pt alert, breathing spontaneously on 6L O2 via FM. VSS. Report shared with RN.Handoff Report: Identifed the Patient, Identified the Reponsible Provider, Reviewed the pertinent medical history, Discussed the surgical course, Reviewed Intra-OP anesthesia mangement and issues during anesthesia, Set expectations for post-procedure period and Allowed opportunity for questions and acknowledgement of understanding      Vitals: (Last set prior to Anesthesia Care Transfer)    CRNA VITALS  2/5/2019 1040 - 2/5/2019 1116      2/5/2019             Resp Rate (observed):  2  (Abnormal)                 Electronically Signed By: PERLA Ovalle CRNA  February 5, 2019  11:16 AM

## 2019-02-05 NOTE — PLAN OF CARE
"/79 (BP Location: Left arm)   Pulse 61   Temp 96.4  F (35.8  C) (Oral)   Resp 16   Ht 1.346 m (4' 5\")   Wt 84.7 kg (186 lb 12.8 oz)   LMP  (LMP Unknown)   SpO2 98%   BMI 46.75 kg/m      Activity: Pt up ad peggy during shift  Neuros: A&O x4, calls appropriately. CMS intact   Cardiac: Intermittently kelley  Respiratory: Pt denies shortness of breath or chest pain. LS clear/diminished. 98% on RA.   GI/: Pt voiding spontaneously, not saving. Pt reports no BM in 3 days.   Diet: NPO for procedure in AM   Skin: Old Lap sites CDI   Lines: R PIV infusing MIVF @ 100 mL/hr  Drains: R PTC drain w/ 50 mL output throughout whole shift  Pain/nausea: Pain managed w/ PRN tylenol x2   Plan: Plan for IR @ 0700. Report given to PACU.       "

## 2019-02-06 VITALS
TEMPERATURE: 97.3 F | BODY MASS INDEX: 43.23 KG/M2 | SYSTOLIC BLOOD PRESSURE: 100 MMHG | HEART RATE: 61 BPM | RESPIRATION RATE: 18 BRPM | WEIGHT: 186.8 LBS | OXYGEN SATURATION: 96 % | HEIGHT: 55 IN | DIASTOLIC BLOOD PRESSURE: 62 MMHG

## 2019-02-06 PROBLEM — Z46.82: Status: ACTIVE | Noted: 2019-02-06

## 2019-02-06 LAB
ALBUMIN SERPL-MCNC: 2.5 G/DL (ref 3.4–5)
ALP SERPL-CCNC: 559 U/L (ref 40–150)
ALT SERPL W P-5'-P-CCNC: 400 U/L (ref 0–50)
ANION GAP SERPL CALCULATED.3IONS-SCNC: 7 MMOL/L (ref 3–14)
AST SERPL W P-5'-P-CCNC: 222 U/L (ref 0–45)
BILIRUB SERPL-MCNC: 5 MG/DL (ref 0.2–1.3)
BUN SERPL-MCNC: 4 MG/DL (ref 7–30)
CALCIUM SERPL-MCNC: 8.6 MG/DL (ref 8.5–10.1)
CHLORIDE SERPL-SCNC: 102 MMOL/L (ref 94–109)
CO2 SERPL-SCNC: 31 MMOL/L (ref 20–32)
CREAT SERPL-MCNC: 0.68 MG/DL (ref 0.52–1.04)
ERYTHROCYTE [DISTWIDTH] IN BLOOD BY AUTOMATED COUNT: 14 % (ref 10–15)
GFR SERPL CREATININE-BSD FRML MDRD: >90 ML/MIN/{1.73_M2}
GLUCOSE SERPL-MCNC: 103 MG/DL (ref 70–99)
HCT VFR BLD AUTO: 34.8 % (ref 35–47)
HGB BLD-MCNC: 11.3 G/DL (ref 11.7–15.7)
MAGNESIUM SERPL-MCNC: 1.8 MG/DL (ref 1.6–2.3)
MCH RBC QN AUTO: 29 PG (ref 26.5–33)
MCHC RBC AUTO-ENTMCNC: 32.5 G/DL (ref 31.5–36.5)
MCV RBC AUTO: 90 FL (ref 78–100)
PHOSPHATE SERPL-MCNC: 4.2 MG/DL (ref 2.5–4.5)
PLATELET # BLD AUTO: 327 10E9/L (ref 150–450)
POTASSIUM SERPL-SCNC: 3.6 MMOL/L (ref 3.4–5.3)
PROT SERPL-MCNC: 6.4 G/DL (ref 6.8–8.8)
RBC # BLD AUTO: 3.89 10E12/L (ref 3.8–5.2)
SODIUM SERPL-SCNC: 140 MMOL/L (ref 133–144)
WBC # BLD AUTO: 6.4 10E9/L (ref 4–11)

## 2019-02-06 PROCEDURE — 80053 COMPREHEN METABOLIC PANEL: CPT | Performed by: STUDENT IN AN ORGANIZED HEALTH CARE EDUCATION/TRAINING PROGRAM

## 2019-02-06 PROCEDURE — 36415 COLL VENOUS BLD VENIPUNCTURE: CPT | Performed by: STUDENT IN AN ORGANIZED HEALTH CARE EDUCATION/TRAINING PROGRAM

## 2019-02-06 PROCEDURE — 84100 ASSAY OF PHOSPHORUS: CPT | Performed by: STUDENT IN AN ORGANIZED HEALTH CARE EDUCATION/TRAINING PROGRAM

## 2019-02-06 PROCEDURE — 83735 ASSAY OF MAGNESIUM: CPT | Performed by: STUDENT IN AN ORGANIZED HEALTH CARE EDUCATION/TRAINING PROGRAM

## 2019-02-06 PROCEDURE — 25000132 ZZH RX MED GY IP 250 OP 250 PS 637: Performed by: STUDENT IN AN ORGANIZED HEALTH CARE EDUCATION/TRAINING PROGRAM

## 2019-02-06 PROCEDURE — T1013 SIGN LANG/ORAL INTERPRETER: HCPCS | Mod: U3

## 2019-02-06 PROCEDURE — 25000132 ZZH RX MED GY IP 250 OP 250 PS 637: Performed by: NEUROLOGICAL SURGERY

## 2019-02-06 PROCEDURE — 85027 COMPLETE CBC AUTOMATED: CPT | Performed by: STUDENT IN AN ORGANIZED HEALTH CARE EDUCATION/TRAINING PROGRAM

## 2019-02-06 PROCEDURE — 25800025 ZZH RX 258: Performed by: NEUROLOGICAL SURGERY

## 2019-02-06 PROCEDURE — 25000128 H RX IP 250 OP 636: Performed by: STUDENT IN AN ORGANIZED HEALTH CARE EDUCATION/TRAINING PROGRAM

## 2019-02-06 RX ORDER — OXYCODONE HYDROCHLORIDE 5 MG/1
5-10 TABLET ORAL EVERY 4 HOURS PRN
Qty: 20 TABLET | Refills: 0 | Status: SHIPPED | OUTPATIENT
Start: 2019-02-06 | End: 2019-02-06

## 2019-02-06 RX ORDER — OXYCODONE HYDROCHLORIDE 5 MG/1
5-10 TABLET ORAL EVERY 4 HOURS PRN
Qty: 20 TABLET | Refills: 0 | Status: SHIPPED | OUTPATIENT
Start: 2019-02-06 | End: 2019-03-07

## 2019-02-06 RX ADMIN — OXYCODONE HYDROCHLORIDE 5 MG: 5 TABLET ORAL at 17:44

## 2019-02-06 RX ADMIN — OXYCODONE HYDROCHLORIDE 5 MG: 5 TABLET ORAL at 09:58

## 2019-02-06 RX ADMIN — POTASSIUM CHLORIDE, DEXTROSE MONOHYDRATE AND SODIUM CHLORIDE: 150; 5; 450 INJECTION, SOLUTION INTRAVENOUS at 00:24

## 2019-02-06 RX ADMIN — PIPERACILLIN SODIUM,TAZOBACTAM SODIUM 3.38 G: 3; .375 INJECTION, POWDER, FOR SOLUTION INTRAVENOUS at 03:40

## 2019-02-06 RX ADMIN — ACETAMINOPHEN 650 MG: 325 TABLET, FILM COATED ORAL at 03:56

## 2019-02-06 ASSESSMENT — ACTIVITIES OF DAILY LIVING (ADL)
ADLS_ACUITY_SCORE: 12

## 2019-02-06 NOTE — PROGRESS NOTES
"Brief Surgery Progress Note:    Team was paged by nursing staff regarding increased bloody output from patient's LOREN drain in the LUQ. Patient went to IR earlier today for PTC tube placement for remote common bile duct injury. The drain was checked twice, an hour apart, with a progressive increase in serosanguinous output. Estimated 500 ml over one hour. Patient is currently asymptomatic with stable vital signs. Drain insertion site appears intact, mildly tender, no erythema. Drain bag filled with moderate serosanguineous output with a few clots and some darker green solid pieces.    Vital signs:  Temp: 97  F (36.1  C) Temp src: Oral BP: 127/77 Pulse: 61 Heart Rate: 63 Resp: 23 SpO2: 97 % O2 Device: Nasal cannula Oxygen Delivery: 2 LPM Height: 134.6 cm (4' 5\") Weight: 84.7 kg (186 lb 12.8 oz)  Estimated body mass index is 46.75 kg/m  as calculated from the following:    Height as of this encounter: 1.346 m (4' 5\").    Weight as of this encounter: 84.7 kg (186 lb 12.8 oz).      -Continue close monitoring with q1 vital checks  - Q4h Hgb x3  - Ricardo resident notified  - No other interventions at this time, please call with questions.    Radha Morillo MD  General Surgery PGY-1  "

## 2019-02-06 NOTE — PLAN OF CARE
"/62 (BP Location: Left arm)   Pulse 61   Temp 97.3  F (36.3  C) (Oral)   Resp 18   Ht 1.346 m (4' 5\")   Wt 84.7 kg (186 lb 12.8 oz)   LMP  (LMP Unknown)   SpO2 96%   BMI 46.75 kg/m     Afebrile, pain is managed with oral oxy, tolerating diet, patient learning center went over drainage teaching with patient, patient demonstrated correct technique with irrigation of the drain, biliary drainage bag changed, up with SBA, voiding without saving, waiting for patient to get a ride home,  services utilized with cares, 300cc of dark brown red output from the biliary drain, +BS, cont with POC  "

## 2019-02-06 NOTE — PLAN OF CARE
Vital signs:  Temp: 96  F (35.6  C) Temp src: Oral BP: 127/76 Heart Rate: 56 Resp: 16 SpO2: 95 % O2 Device: Nasal cannula Oxygen Delivery: 1 LPM     Neuro: A&Ox4.   Vitals: AVSS on 1-2L NC, On capnography.   Respiratory: LS clear, denies SOB.   Pain: Pt c/o slight discomfort at LUQ drain site. Tylenol prn x1 with adequate relief.   Diet: Tolerating CLD, no nausea.   GI/: Longo patent with adequate UOP. +BS, +flatus, no BM.   Drains: R & L drains to gravity, L drain tube was irrigated to maintain patency d/t clots.    IV Access: PIV infusing MIVF at 100 ml/hr. Zosyn Q 6 hrs.   New changes this shift: Increased bloody output from LUQ drain, asymptomatic, VSS.   Gen. Surgery team notified Geeta Landry @2035 for bloody w/clots output of 120 ml & Radha Marks @2133 for increased bloody w/clots output of 400 ml. Gen. Surgery team came to assess pt at bedside. New order placed for Hgb check at HS.   Labs: Hgb 11.8 at 2227.   Plan: Continue to monitor:

## 2019-02-06 NOTE — PROGRESS NOTES
Surgical Oncology Progress Note    Bryanna Taylor  [id number]    No acute overnight events. Pain control adequate. No nausea or emesis. PTC drain output 540, serosanguinous. Hgb stable. Cordero left in place post procedure.    Temp:  [96  F (35.6  C)-98.2  F (36.8  C)] 97.1  F (36.2  C)  Pulse:  [58-72] 61  Heart Rate:  [56-72] 60  Resp:  [11-24] 20  BP: (119-158)/(68-92) 119/68  SpO2:  [95 %-100 %] 97 %    Intake/Output Summary (Last 24 hours) at 2/6/2019 1031  Last data filed at 2/6/2019 0640  Gross per 24 hour   Intake 3780 ml   Output 2585 ml   Net 1195 ml     NAD, resting comfortably  non-labored breathing on room air  Soft, non tender, incisions CDI without erythema/drainage  RUQ drain with scant bilious output  IR PTC drain with serosanguinous/bile tinged output  alert, oriented, non focal    Tbili 4.4 -> 5.0      WBC 6.4  HGB 11.3  Cr 0.68     38 year old Thai speaking female with CBD injury during lap sheryl on 1/14, s/p attempted ptc tube placement, discharged with surgical drain in place on 1/18, re-presented with occluded duct and intrahepatic biliary dilation. Underwent IR PTC drain placement on 2/5 for biliary diversion.      PRN PO and IV pain medications  Diet as tolerated, TKO IV fluids  Discontinue IV zosyn today  Remove cordero and monitor for void  Patient learning center appt today for drain cares  Plan for discharge home today once comfortable with drains     Seen with chief who will discuss with staff.     Caren Graves MD  General Surgery PGY-3

## 2019-02-06 NOTE — PROGRESS NOTES
Looks well  Feels well  LOREN with no further output  PTC draining well    Discussed through  plan for biliary reconstruction in 6-8 weeks. Will see in 2 weeks to recheck.    Call numbers given to patient.  I asked her to call with any questions or concerns.

## 2019-02-07 NOTE — DISCHARGE SUMMARY
DISCHARGE:  D: Patient with orders to discharge home.   I: Discharge instructions, medications, & follow ups reviewed with patient, Bryanna. Copy of discharge summary given to Bryanna. PIV removed. All belongings packed & sent with patient. Medications filled & picked up at discharge pharmacy. Paperwork faxed.   A: Patient in stable condition. AVSS. Bryanna had no further questions regarding discharge instructions and medications. Patient transferred out by wheelchair & left with uber.   P: Plan for follow-up with Dr. Dangelo in two weeks. Patient is aware of plan and has home health set-up to help with drain management. Patient also went to [facility] today and showed understanding of managing these drains.

## 2019-02-08 NOTE — DISCHARGE SUMMARY
SURGICAL ONCOLOGY DISCHARGE SUMMARY  Patient Name: Bryanna Taylor  MR#: [id number]  Date of Admission: 2/2/2019 11:16 AM  Date of Discharge: 2/6/2019  Discharging Physician: Dr. Michael Morillo    Discharge Diagnoses:  1. Injury of bile duct, initial encounter    2. Biliary obstruction        Procedures Performed this admission:  IR PTC drain placement 2/5/2019    Consultations:  Interventional Radiology    Brief HPI:  Bryanna Taylor is a 38 year old female known to the surgical oncology service after admission from outside facility on 1/14/2019 due to intra-operative common bile duct transection during laparoscopic cholecystectomy. Drain was placed and patient was transferred to [facility]. During prior hospitalization, PTC was attempted but drain placement was unable to be performed due to small size of bile ducts. She was discharged on 1/18 with course of antibiotics and plan to follow up in clinic with repeat MRCP. She called on 2/2 with decreased surgical drain output, decreased appetite and increased abdominal pain. She also had elevated Tbili 3.3, and MRCP with inflammation concerning for cholangitis. WBC nl.     Hospital Course:   She was admitted to the general care floor under the surgical oncology service for further management. She was started on IV zosyn for antibiotic coverage. IR was consulted to discuss repeat attempt at PTC tube placement given that MRCP now shoed increased intra-hepatic biliary ductal dilation. She ultimately underwent this procedure on 2/5 (HD#3) with successful placement of PTC drain. IV zosyn was discontinued on 2/6 given achievement of source control / biliary diversion with PTC drain. Cardiopulmonary and renal status remained stable throughout the admission. Diet was advanced and patient achieved full return of bowel function. She underwent teaching for drain cares with the patient learning center.     On day of discharge, the patient was deemed to be in stable and improved  condition and discharged with appropriate follow up instructions. At that time the patient was tolerating a regular diet with return of normal bowel function, pain was controlled with oral medications and the patient was ambulating and voiding independently. She will follow up in two weeks in clinic.      Medications on Discharge:      Review of your medicines      CONTINUE these medicines which have NOT CHANGED      Dose / Directions   acetaminophen 325 MG tablet  Commonly known as:  TYLENOL  Used for:  Pain      Dose:  650 mg  Take 2 tablets (650 mg) by mouth every 4 hours as needed for mild pain  Quantity:  50 tablet  Refills:  0     oxyCODONE 5 MG tablet  Commonly known as:  ROXICODONE  Used for:  Injury of bile duct, initial encounter      Dose:  5-10 mg  Take 1-2 tablets (5-10 mg) by mouth every 4 hours as needed for moderate to severe pain  Quantity:  20 tablet  Refills:  0     senna-docusate 8.6-50 MG tablet  Commonly known as:  SENOKOT-S/PERICOLACE  Used for:  Pain      Dose:  1 tablet  Take 1 tablet by mouth 2 times daily as needed for constipation  Quantity:  30 tablet  Refills:  0           Where to get your medicines      Some of these will need a paper prescription and others can be bought over the counter. Ask your nurse if you have questions.    Bring a paper prescription for each of these medications    oxyCODONE 5 MG tablet       Discharge Procedure Orders   CBC with platelets   Standing Status: Future Standing Exp. Date: 04/07/19   Order Comments: Last Lab Result: Hemoglobin (g/dL)       Date                     Value                 02/06/2019               11.3 (L)         ----------     Comprehensive metabolic panel   Standing Status: Future Standing Exp. Date: 04/07/19     Home care nursing referral   Referral Type: Home Health Therapies & Aides   Number of Visits Requested: 1     Reason for your hospital stay   Order Comments: New drain placement to divert flow of bile     [facility]  Follow-up and recommended labs and tests   Order Comments: Follow up with Dr. Morillo in two weeks. Please obtain blood work prior to arrival.    Appointments on [place] and/or [facility] (with [facility] or [facility] provider or service). Call [phone] if you haven't heard regarding these appointments within 7 days of discharge.     Activity   Order Comments: Your activity upon discharge: activity as tolerated     Order Specific Question Answer Comments   Is discharge order? Yes      Discharge Instructions   Order Comments: If your travel plans upon discharge include prolonged periods of sitting (a lengthy car or plane ride), it is highly beneficial to get up and walk at least once per hour to help prevent swelling and blood clots. You may shower, let warm soapy water run over incision and pat dry. Do not submerge, soak, or scrub incisions for 4 weeks after surgery. Take incentive spirometer home for continued frequent use. You will be discharged with narcotic pain medications. Please take them only as needed and do not operate a car or heavy machinery for 24 hours after taking them. Narcotic pain medications like oxycodone are constipating. Please ensure that you are drinking adequate amounts of fluids and taking stool softeners while you are taking narcotics. Take Miralax as needed for constipation. Do not drive until you can with stand pressing the brake pedal quickly and fully without pain and you are not distracted by pain. Call for fever greater than 101.5, chills, red skin around incision, drainage from incision, increased swelling from the incision, bleeding from the incision that is not controlled with light pressure, inability to tolerate diet, new nausea/vomiting or other new/worsening symptoms. You may also call the surgical oncology nurse care coordinator from 8:00am-4:30pm ONI Perez at [phone]. For after hours questions or concerns you can contact the on-call surgical oncology resident  "(nights and weekends [phone] and ask \"I would like to page the Surgical Oncology Resident on call.\"). In emergencies, call [phone] Follow Up: Follow up in clinic with Dr. Morillo in 2 weeks. Will need labs. You should be called to make an appointment within 3 business days. If you are not contacted, call [phone] to make an appointment.     Tubes and drains   Order Comments: You are going home with the following tubes or drains: Surgical drain and IR PTC drain (two drains total)    - Monitor and record daily drain output. Call Dr. Morillo for drain outputs more than 700 ml per day.   - No drain site cares needed. If there is drainage around the drain site, dress with gauze dressing and change daily and as needed to keep clean and dry. Call Dr. Morillo if there is drainage around the drain site.     Full Code     Order Specific Question Answer Comments   Code status determined by: Discussion with patient/legal decision maker      Diet   Order Comments: Follow this diet upon discharge: Regular diet     Order Specific Question Answer Comments   Is discharge order? Yes        "

## 2019-02-10 LAB
BACTERIA SPEC CULT: NO GROWTH
SPECIMEN SOURCE: NORMAL

## 2019-02-12 ENCOUNTER — CARE COORDINATION (OUTPATIENT)
Dept: SURGERY | Facility: CLINIC | Age: 39
End: 2019-02-12

## 2019-02-12 ENCOUNTER — TELEPHONE (OUTPATIENT)
Dept: ONCOLOGY | Facility: CLINIC | Age: 39
End: 2019-02-12

## 2019-02-12 LAB
BACTERIA SPEC CULT: NORMAL
Lab: NORMAL
SPECIMEN SOURCE: NORMAL

## 2019-02-12 NOTE — TELEPHONE ENCOUNTER
"----- Message from Migdalia Ag RN sent at 2/12/2019  3:01 PM CST -----  Regarding: RE:  RN calling re: increase drain output   Contact: [phone]  Oriana-   I followed up with patient. She reports:   1. Drain output Fri: 735mL, Sat: 795mL Sun: 805mL, Mon: 585mL. Concerned as she was told her drain shouldn't put out more than 700mL/day  2. Denies fever  3. Mild pain- relieved by Tylenol, has not taken Oxy  4. Able to take in small amounts of food and water, reports pain at incision (lap?) site when eating more than small amounts  5. Patient report she contacted the hospital at [phone] and requested they page Dr. Morillo multiple times and never heard back (Just FYI)    Please advise. Thank you!  Migdalia Ag RN   [facility]    ----- Message -----  From: Oriana Perez RN  Sent: 2/12/2019   2:34 PM  To: Migdalia Ag RN, Oriana Perez RN  Subject: RE:  RN calling re: increase drain output      She has not called me and I have no voicemail, could you please call her to find out more, when it started, fevers, is she tolerating PO intake? Pain?     Im assuming she may be calling IR and not me?     If not let me know and I can try and call soon too.    Thanks,  KJ       ----- Message -----  From: Migdalia Ag RN  Sent: 2/12/2019   2:27 PM  To: Migdalia Ag RN, Oriana Perez RN  Subject:  RN calling re: increase drain output          RN from [facility] (Pt's PCP) calling triage. Reports patient and her home care RN have both contacted  reporting they have tried multiple times to reach Dr. Morillo's team regarding increased output in drain. Reporting >700mL out for \"several days\". RN calling did not have further detail. Requesting we follow-up with patient.     Have you spoken to patient about this? She says she called during business hours and to the on-call line and I don't see any notes. Please advise if you will follow-up or would like me to. Pt is Stateless " speaking- will need an . Thank you!    Migdalia Ag RN   [facility]

## 2019-02-12 NOTE — TELEPHONE ENCOUNTER
Writer routed detailed InBasket message to MAGGY Bailey. Recommendations pending.     Migdalia Ag RN   [facility]

## 2019-02-12 NOTE — PROGRESS NOTES
Surgical Oncology RN Care Coordination Note:     Received a note from triage that patient has been trying to reach our office to discuss drain output. No voicemails or message from patient on my line, unclear of what number patient is calling.     Message out to Dr. Morillo to discuss drain output reported to triage nurse.     Called patient with  and had to leave a message. Left a message with our direct number and that we have a note out to Dr. Morillo to discuss the output she is reporting. Will follow up with patient once discussed with Dr. Morillo.     Oriana Perez, RN, BSN  Care Coordinator   [phone]

## 2019-02-13 NOTE — PROGRESS NOTES
Surgical Oncology RN Care Coordination Note:     Called and spoke with patient regarding drain output via . Informed her that I spoke with Dr. Morillo and that it is expected that she will have the increased output from the drain as all of the liver drainage is now coming through the drain. Informed her that Dr. Morillo wants to make sure she is staying hydrated and she needs to drink plenty of water d/t this large amount of drain output. She confirmed she is not having any fevers and she is tolerating oral intake. Informed her that if she develops fevers or new pain or if the drain stop working she should call our office to discuss further. She verbalized understanding and was provided with our direct number for further questions. She is aware scheduling will contact her to arrange follow up.     Oriana Perez RN, BSN  Care Coordinator   [phone]

## 2019-02-22 ENCOUNTER — APPOINTMENT (OUTPATIENT)
Dept: LAB | Facility: CLINIC | Age: 39
End: 2019-02-22
Attending: SURGERY
Payer: COMMERCIAL

## 2019-02-22 ENCOUNTER — OFFICE VISIT (OUTPATIENT)
Dept: SURGERY | Facility: CLINIC | Age: 39
End: 2019-02-22
Attending: SURGERY
Payer: COMMERCIAL

## 2019-02-22 VITALS
DIASTOLIC BLOOD PRESSURE: 86 MMHG | HEART RATE: 66 BPM | OXYGEN SATURATION: 100 % | SYSTOLIC BLOOD PRESSURE: 125 MMHG | WEIGHT: 189.4 LBS | HEIGHT: 55 IN | RESPIRATION RATE: 18 BRPM | BODY MASS INDEX: 43.83 KG/M2 | TEMPERATURE: 98 F

## 2019-02-22 DIAGNOSIS — S36.13XA INJURY OF BILE DUCT, INITIAL ENCOUNTER: ICD-10-CM

## 2019-02-22 LAB
ALBUMIN SERPL-MCNC: 3.2 G/DL (ref 3.4–5)
ALP SERPL-CCNC: 306 U/L (ref 40–150)
ALT SERPL W P-5'-P-CCNC: 147 U/L (ref 0–50)
ANION GAP SERPL CALCULATED.3IONS-SCNC: 8 MMOL/L (ref 3–14)
AST SERPL W P-5'-P-CCNC: 53 U/L (ref 0–45)
BILIRUB SERPL-MCNC: 0.6 MG/DL (ref 0.2–1.3)
BUN SERPL-MCNC: 10 MG/DL (ref 7–30)
CALCIUM SERPL-MCNC: 8.2 MG/DL (ref 8.5–10.1)
CHLORIDE SERPL-SCNC: 108 MMOL/L (ref 94–109)
CO2 SERPL-SCNC: 24 MMOL/L (ref 20–32)
CREAT SERPL-MCNC: 0.48 MG/DL (ref 0.52–1.04)
ERYTHROCYTE [DISTWIDTH] IN BLOOD BY AUTOMATED COUNT: 13.2 % (ref 10–15)
GFR SERPL CREATININE-BSD FRML MDRD: >90 ML/MIN/{1.73_M2}
GLUCOSE SERPL-MCNC: 88 MG/DL (ref 70–99)
HCT VFR BLD AUTO: 33.4 % (ref 35–47)
HGB BLD-MCNC: 10.8 G/DL (ref 11.7–15.7)
MCH RBC QN AUTO: 27.9 PG (ref 26.5–33)
MCHC RBC AUTO-ENTMCNC: 32.3 G/DL (ref 31.5–36.5)
MCV RBC AUTO: 86 FL (ref 78–100)
PLATELET # BLD AUTO: 327 10E9/L (ref 150–450)
POTASSIUM SERPL-SCNC: 3.3 MMOL/L (ref 3.4–5.3)
PROT SERPL-MCNC: 7.4 G/DL (ref 6.8–8.8)
RBC # BLD AUTO: 3.87 10E12/L (ref 3.8–5.2)
SODIUM SERPL-SCNC: 140 MMOL/L (ref 133–144)
WBC # BLD AUTO: 5.9 10E9/L (ref 4–11)

## 2019-02-22 PROCEDURE — 85027 COMPLETE CBC AUTOMATED: CPT | Performed by: STUDENT IN AN ORGANIZED HEALTH CARE EDUCATION/TRAINING PROGRAM

## 2019-02-22 PROCEDURE — 36415 COLL VENOUS BLD VENIPUNCTURE: CPT

## 2019-02-22 PROCEDURE — G0463 HOSPITAL OUTPT CLINIC VISIT: HCPCS | Mod: ZF

## 2019-02-22 PROCEDURE — 80053 COMPREHEN METABOLIC PANEL: CPT | Performed by: STUDENT IN AN ORGANIZED HEALTH CARE EDUCATION/TRAINING PROGRAM

## 2019-02-22 ASSESSMENT — PAIN SCALES - GENERAL: PAINLEVEL: NO PAIN (0)

## 2019-02-22 ASSESSMENT — MIFFLIN-ST. JEOR: SCORE: 1344.49

## 2019-02-22 NOTE — LETTER
2/22/2019       RE: Bryanna Taylor  [address] [facility] [address]     Dear Colleague,    Thank you for referring your patient, Bryanna Taylor, to the [facility]. Please see a copy of my visit note below.      HISTORY:  Ms. Taylor is a 38-year-old female who had a bile duct transection during a laparoscopic cholecystectomy on 01/14.  She initially had intra-abdominal drains, which were controlling her leak.  Initial attempts at a PTC were not possible because of the decompressed biliary tree; however, with time she did have some narrowing of her biliary injury which caused dilation of the intrahepatic ducts and she has more recently had a successful PTC placed by Interventional Radiology.  The PTC is now draining over 500 mL a day as expected and the abdominal drain is draining minimal output.      Ms. Taylor presents with an  today.  She is actually feeling quite well.  She is tolerating a diet and taking in enough fluids to avoid dehydration.  I discussed with her at length our plans for a biliary reconstruction with an open Leila-en-Y biliary reconstruction technique.  I drew her several pictures showing her the anatomy and our plan for reconstruction and detailed some of the risks of surgery as well including bleeding, infection, anastomotic leak, venous thromboembolism as well as long-term biliary strictures, although hopefully by the time we reconstructed the bile duct will be enlarged to the point that this should be a lifelong repair.  Overall, Ms. Taylor I think is doing quite well and tolerating her PTC drainage without difficulty.  We will get her on the operating room schedule in mid-March for a planned reconstruction.      A total of 30 minutes was spent with Ms. Taylor, all of which was in consultation today.     Again, thank you for allowing me to participate in the care of your patient.      Sincerely,    Michael Morillo MD

## 2019-02-22 NOTE — PROGRESS NOTES
HISTORY:  Ms. Taylor is a 38-year-old female who had a bile duct transection during a laparoscopic cholecystectomy on 01/14.  She initially had intra-abdominal drains, which were controlling her leak.  Initial attempts at a PTC were not possible because of the decompressed biliary tree; however, with time she did have some narrowing of her biliary injury which caused dilation of the intrahepatic ducts and she has more recently had a successful PTC placed by Interventional Radiology.  The PTC is now draining over 500 mL a day as expected and the abdominal drain is draining minimal output.      Ms. Taylor presents with an  today.  She is actually feeling quite well.  She is tolerating a diet and taking in enough fluids to avoid dehydration.  I discussed with her at length our plans for a biliary reconstruction with an open Leila-en-Y biliary reconstruction technique.  I drew her several pictures showing her the anatomy and our plan for reconstruction and detailed some of the risks of surgery as well including bleeding, infection, anastomotic leak, venous thromboembolism as well as long-term biliary strictures, although hopefully by the time we reconstructed the bile duct will be enlarged to the point that this should be a lifelong repair.  Overall, Ms. Taylor I think is doing quite well and tolerating her PTC drainage without difficulty.  We will get her on the operating room schedule in mid-March for a planned reconstruction.      A total of 30 minutes was spent with Ms. Taylor, all of which was in consultation today.

## 2019-02-22 NOTE — NURSING NOTE
"Oncology Rooming Note    February 22, 2019 10:14 AM   Bryanna Taylor is a 38 year old female who presents for:    Chief Complaint   Patient presents with     Blood Draw     Labs drawn via  by RN in lab. VS taken.     Oncology Clinic Visit     Cholangiopancreatography     Initial Vitals: /86 (BP Location: Right arm, Patient Position: Sitting, Cuff Size: Adult Regular)   Pulse 66   Temp 98  F (36.7  C) (Oral)   Resp 18   Ht 1.346 m (4' 5\")   Wt 85.9 kg (189 lb 6.4 oz)   SpO2 100%   BMI 47.41 kg/m   Estimated body mass index is 47.41 kg/m  as calculated from the following:    Height as of this encounter: 1.346 m (4' 5\").    Weight as of this encounter: 85.9 kg (189 lb 6.4 oz). Body surface area is 1.79 meters squared.  No Pain (0) Comment: Data Unavailable   No LMP recorded. Patient is not currently having periods (Reason: Tubal ).  Allergies reviewed: Yes  Medications reviewed: Yes    Medications: Medication refills not needed today.  Pharmacy name entered into EPIC: Data Unavailable    Clinical concerns: No New Concerns    5 minutes for nursing intake (face to face time)     DAHIANA Maxwell      "

## 2019-03-07 ENCOUNTER — OFFICE VISIT (OUTPATIENT)
Dept: SURGERY | Facility: CLINIC | Age: 39
End: 2019-03-07
Attending: SURGERY

## 2019-03-07 ENCOUNTER — ANESTHESIA EVENT (OUTPATIENT)
Dept: SURGERY | Facility: CLINIC | Age: 39
End: 2019-03-07
Payer: MEDICAID

## 2019-03-07 VITALS
RESPIRATION RATE: 18 BRPM | SYSTOLIC BLOOD PRESSURE: 122 MMHG | HEART RATE: 80 BPM | WEIGHT: 189.5 LBS | BODY MASS INDEX: 43.86 KG/M2 | HEIGHT: 55 IN | OXYGEN SATURATION: 95 % | TEMPERATURE: 98.3 F | DIASTOLIC BLOOD PRESSURE: 76 MMHG

## 2019-03-07 DIAGNOSIS — Z01.818 PREOP EXAMINATION: ICD-10-CM

## 2019-03-07 DIAGNOSIS — Z01.818 PREOP EXAMINATION: Primary | ICD-10-CM

## 2019-03-07 LAB
ALBUMIN SERPL-MCNC: 3.2 G/DL (ref 3.4–5)
ALP SERPL-CCNC: 402 U/L (ref 40–150)
ALT SERPL W P-5'-P-CCNC: 107 U/L (ref 0–50)
ANION GAP SERPL CALCULATED.3IONS-SCNC: 4 MMOL/L (ref 3–14)
AST SERPL W P-5'-P-CCNC: 35 U/L (ref 0–45)
BILIRUB SERPL-MCNC: 0.4 MG/DL (ref 0.2–1.3)
BUN SERPL-MCNC: 7 MG/DL (ref 7–30)
CALCIUM SERPL-MCNC: 8.3 MG/DL (ref 8.5–10.1)
CHLORIDE SERPL-SCNC: 105 MMOL/L (ref 94–109)
CO2 SERPL-SCNC: 27 MMOL/L (ref 20–32)
CREAT SERPL-MCNC: 0.53 MG/DL (ref 0.52–1.04)
ERYTHROCYTE [DISTWIDTH] IN BLOOD BY AUTOMATED COUNT: 13.3 % (ref 10–15)
GFR SERPL CREATININE-BSD FRML MDRD: >90 ML/MIN/{1.73_M2}
GLUCOSE SERPL-MCNC: 88 MG/DL (ref 70–99)
HCT VFR BLD AUTO: 37.1 % (ref 35–47)
HGB BLD-MCNC: 11.4 G/DL (ref 11.7–15.7)
INR PPP: 1.11 (ref 0.86–1.14)
MCH RBC QN AUTO: 27.1 PG (ref 26.5–33)
MCHC RBC AUTO-ENTMCNC: 30.7 G/DL (ref 31.5–36.5)
MCV RBC AUTO: 88 FL (ref 78–100)
PLATELET # BLD AUTO: 278 10E9/L (ref 150–450)
POTASSIUM SERPL-SCNC: 4 MMOL/L (ref 3.4–5.3)
PROT SERPL-MCNC: 7.7 G/DL (ref 6.8–8.8)
RBC # BLD AUTO: 4.2 10E12/L (ref 3.8–5.2)
SODIUM SERPL-SCNC: 136 MMOL/L (ref 133–144)
WBC # BLD AUTO: 6.2 10E9/L (ref 4–11)

## 2019-03-07 SDOH — HEALTH STABILITY: MENTAL HEALTH: HOW OFTEN DO YOU HAVE A DRINK CONTAINING ALCOHOL?: NEVER

## 2019-03-07 ASSESSMENT — ENCOUNTER SYMPTOMS: DYSRHYTHMIAS: 0

## 2019-03-07 ASSESSMENT — MIFFLIN-ST. JEOR: SCORE: 1344.95

## 2019-03-07 NOTE — ADDENDUM NOTE
Addendum  created 03/07/19 1224 by Gemini Lou RN    Allergies reviewed, Medication List reviewed, Order Reconciliation Section accessed, Order list changed, Patient Education assessment filed, Patient Education documented on, Patient Education title added, Visit Navigator Flowsheet section accepted

## 2019-03-07 NOTE — ANESTHESIA PREPROCEDURE EVALUATION
Anesthesia Pre-Procedure Evaluation    Patient: Bryanna Taylor   MRN:     [id number] Gender:   female   Age:    38 year old :      1980        Preoperative Diagnosis: Bile Duct Injury   Procedure(s):  Open Leila En Y Biliary Reconstruction     Past Medical History:   Diagnosis Date     Bile duct injury       Past Surgical History:   Procedure Laterality Date     ENDOSCOPIC RETROGRADE CHOLANGIOPANCREATOGRAM N/A 1/15/2019    Procedure: ENDOSCOPIC RETROGRADE CHOLANGIOPANCREATOGRAM, biliary sphincterotomy;  Surgeon: Ryan Costa MD;  Location: [facility] OR     IR BILIARY DRAIN PLACEMENT  2019     IR CHOLIANGIOGRAM (VIA A NEEDLE/ NO EXISTING TUBE)  2019          Anesthesia Evaluation     . Pt has had prior anesthetic. Type: General    No history of anesthetic complications          ROS/MED HX    ENT/Pulmonary:  - neg pulmonary ROS     Neurologic:  - neg neurologic ROS     Cardiovascular:  - neg cardiovascular ROS      (-) arrhythmias and irregular heartbeat/palpitations   METS/Exercise Tolerance:  4 - Raking leaves, gardening   Hematologic:         Musculoskeletal:         GI/Hepatic:     (+) cholecystitis/cholelithiasis (c/b bile duct injury), Other GI/Hepatic bile duct injury     (-) GERD   Renal/Genitourinary:  - ROS Renal section negative    (-) renal disease   Endo:  - neg endo ROS       Psychiatric:         Infectious Disease:  - neg infectious disease ROS       Malignancy:      - no malignancy   Other:    (+) No chance of pregnancy C-spine cleared: No, no H/O Chronic Pain,no other significant disability   - neg other ROS                     PHYSICAL EXAM:   Mental Status/Neuro: A/A/O   Airway: Facies: Feasible  Mallampati: II  Mouth/Opening: Full  TM distance: > 6 cm  Neck ROM: Full   Respiratory: Auscultation: CTAB     Resp. Rate: Normal     Resp. Effort: Normal      CV: Rhythm: Regular  Rate: Age appropriate  Heart: Normal Sounds   Comments:      Dental: Normal                  Results  for EDGAR SIMON (MRN [id number]) as of 3/7/2019 11:39   Ref. Range 3/7/2019 10:09   Sodium Latest Ref Range: 133 - 144 mmol/L 136   Potassium Latest Ref Range: 3.4 - 5.3 mmol/L 4.0   Chloride Latest Ref Range: 94 - 109 mmol/L 105   Carbon Dioxide Latest Ref Range: 20 - 32 mmol/L 27   Urea Nitrogen Latest Ref Range: 7 - 30 mg/dL 7   Creatinine Latest Ref Range: 0.52 - 1.04 mg/dL 0.53   GFR Estimate Latest Ref Range: >60 mL/min/1.73_m2 >90   GFR Estimate If Black Latest Ref Range: >60 mL/min/1.73_m2 >90   Calcium Latest Ref Range: 8.5 - 10.1 mg/dL 8.3 (L)   Anion Gap Latest Ref Range: 3 - 14 mmol/L 4   Albumin Latest Ref Range: 3.4 - 5.0 g/dL 3.2 (L)   Protein Total Latest Ref Range: 6.8 - 8.8 g/dL 7.7   Bilirubin Total Latest Ref Range: 0.2 - 1.3 mg/dL 0.4   Alkaline Phosphatase Latest Ref Range: 40 - 150 U/L 402 (H)   ALT Latest Ref Range: 0 - 50 U/L 107 (H)   AST Latest Ref Range: 0 - 45 U/L 35   Glucose Latest Ref Range: 70 - 99 mg/dL 88   WBC Latest Ref Range: 4.0 - 11.0 10e9/L 6.2   Hemoglobin Latest Ref Range: 11.7 - 15.7 g/dL 11.4 (L)   Hematocrit Latest Ref Range: 35.0 - 47.0 % 37.1   Platelet Count Latest Ref Range: 150 - 450 10e9/L 278   RBC Count Latest Ref Range: 3.8 - 5.2 10e12/L 4.20   MCV Latest Ref Range: 78 - 100 fl 88   MCH Latest Ref Range: 26.5 - 33.0 pg 27.1   MCHC Latest Ref Range: 31.5 - 36.5 g/dL 30.7 (L)   RDW Latest Ref Range: 10.0 - 15.0 % 13.3   INR Latest Ref Range: 0.86 - 1.14  1.11   ABO Unknown O   RH(D) Unknown Pos   Antibody Screen Unknown Neg   Test Valid Only At Latest Units:     [facility]...   Specimen Expires Unknown 03/10/2019         Preop Vitals  BP Readings from Last 3 Encounters:   02/22/19 125/86   02/06/19 100/62   01/18/19 140/85    Pulse Readings from Last 3 Encounters:   02/22/19 66   02/05/19 61   01/18/19 64      Resp Readings from Last 3 Encounters:   02/22/19 18   02/06/19 18   01/18/19 18    SpO2 Readings from Last 3 Encounters:   02/22/19 100%  "  02/06/19 96%   01/18/19 98%      Temp Readings from Last 1 Encounters:   02/22/19 98  F (36.7  C) (Oral)    Ht Readings from Last 1 Encounters:   02/22/19 1.346 m (4' 5\")      Wt Readings from Last 1 Encounters:   02/22/19 85.9 kg (189 lb 6.4 oz)    Estimated body mass index is 47.41 kg/m  as calculated from the following:    Height as of 2/22/19: 1.346 m (4' 5\").    Weight as of 2/22/19: 85.9 kg (189 lb 6.4 oz).     LDA:  Closed/Suction Drain 1 Right Abdomen Other (Comment) (Active)   Site Description [facility] 2/5/2019  8:13 PM   Dressing Status Normal: Clean, Dry & Intact 2/5/2019 12:00 PM   Drainage Appearance Yellow;Thin 2/5/2019  8:13 PM   Status Open to gravity drainage 2/5/2019  8:13 PM   Output (ml) 300 ml 2/6/2019  1:00 PM   Number of days: 52       Biliary Drain (Active)   Site Description [facility] 2/5/2019  9:33 PM   Dressing Status Normal: Clean, Dry & Intact 2/5/2019 12:00 PM   Drainage Appearance Other (Comment) 2/5/2019  9:33 PM   Status Open to gravity drainage 2/5/2019  9:33 PM   Output (ml) 400 ml 2/5/2019  9:33 PM   Intake (ml) 20 ml 2/6/2019  1:04 AM   Number of days: 30            Assessment:   ASA SCORE: 2    NPO Status: > 6 hours since completed Solid Foods   Documentation: H&P complete; Preop Testing complete; Consents complete   Proceeding: Proceed without further delay  Tobacco Use:  NO Active use of Tobacco/UNKNOWN Tobacco use status     Plan:   Anes. Type:  General; Regional     RA Details:  FOR POSTOP PAIN CONTROL     RA-Location/Type: Nerve Block; Paravertebral   Pre-Induction: Midazolam IV; Gabapentin PO   Induction:  IV (Standard) (modified RSI given bile duct laceration with drains and elevated LFTs)   Airway: Oral ETT   Access/Monitoring: PIV; 2nd PIV   Maintenance: Balanced   Emergence: Procedure Site   Logistics: Same Day Surgery     Postop Pain/Sedation Strategy:  Standard-Options: Opioids PRN     PONV Management:  Adult Risk Factors: Female, Non-Smoker, Postop Opioids  Prevention: " Ondansetron; Dexamethasone     CONSENT: Direct conversation   Plan and risks discussed with: Patient          Comments for Plan/Consent:  ______________________________________________________________________  I discussed the risks and benefits of general anesthesia with the patient.  Questions were sought and answered.      Lucien Sam MD  Attending Anesthesiologist                    PAC Discussion and Assessment    ASA Classification: 2  Case is suitable for: [place]  Anesthetic techniques and relevant risks discussed: GA  Invasive monitoring and risk discussed:   Types:   Possibility and Risk of blood transfusion discussed:   NPO instructions given:   Additional anesthetic preparation and risks discussed:   Needs early admission to pre-op area:   Other:     PAC Resident/NP Anesthesia Assessment:  Bryanna Taylor is a 37 yo female scheduled for Open Leila En Y Biliary Reconstruction on 3/13/2019 by Dr. Morillo in treatment of bile duct leak      Previous anesthesia, last at [facility], without complications   1) Cardiac: No known cardiac diagnosis or symptoms. METS >4  2) Pulmonary:  Never smoked, denies pulmonary symptoms  3) GI: Bile duct transection during a laparoscopic cholecystectomy on 01/14. PTC drain placed by IR. She currently has 2 drains in place, both connected to bags with drainage only in the left bag.  4) Endo: BMI 47               Reviewed and Signed by PAC Mid-Level Provider/Resident  Mid-Level Provider/Resident: PERLA Mccollum  Date: 3/7/2019  Time: 0940    Attending Anesthesiologist Anesthesia Assessment:        Anesthesiologist:   Date:   Time:   Pass/Fail:   Disposition:     PAC Pharmacist Assessment:        Pharmacist:   Date:   Time:        PERLA Adkins CNS

## 2019-03-07 NOTE — PATIENT INSTRUCTIONS
Preparing for Your Surgery      Name:  Bryanna Taylor   MRN:  [id number]   :  1980   Today's Date:  3/7/2019     Arriving for surgery:  Surgery date:  3/13/19   Arrival time:  6 am    Please come to:  [facility] [address]  [address] [place]  [address]    -   parking is available in front of the hospital from 5:15 am to 8:00 pm    -  Stop at the Information Desk in the lobby    -   Inform the information person that you are here for surgery. An escort to 3C will be provided. If you would not like an escort, please proceed to 3C on the 3rd floor. [phone]     -  Bring your ID and insurance card.    What can I eat or drink?  -  You may have solid food or milk products until 8 hours prior to your surgery. (Until 12:30 am )  -  You may have water, apple juice or 7up/Sprite until 2 hours prior to your surgery. (Until 6 am- Stop on arrival )    Which medicines can I take?       -  Do not bring your own medications to the hospital.        -  Follow Surgery Clinic instructions regarding Ibuprofen. If no instructions given, NO Ibuprofen the day prior to surgery.     -  Please take these medications the morning of surgery:  Acetaminophen (Tylenol) if needed        How do I prepare myself?  -  Take two showers: one the night before surgery; and one the morning of surgery.         Use Scrubcare or Hibiclens to wash from neck down.  You may use your own shampoo and conditioner. No other hair products.   -  Do NOT use lotion, powder, colognes, deodorant, or antiperspirant the day of your surgery.  -  Do NOT wear any makeup, fingernail polish or jewelry.    Questions or Concerns:  If you have questions or concerns prior to your surgery, call [phone]. (Mon - Fri   8 am- 5:30 pm)  Questions about surgery, contact your Surgeons office.    Influenza visitor restrictions are in force at this time.  The [facility] is prohibiting the following visitors:  -Any sick  persons regardless of age  -Anyone with known exposure to a communicable illness  -Anyone under the age of 5    AFTER YOUR SURGERY  Breathing exercises   Breathing exercises help you recover faster. Take deep breaths and let the air out slowly. This will:     Help you wake up after surgery.    Help prevent complications like pneumonia.  Preventing complications will help you go home sooner.   We may give you a breathing device (incentive spirometer) to encourage you to breathe deeply.   Nausea and vomiting   You may feel sick to your stomach after surgery; if so, let your nurse know.    Pain control:  After surgery, you may have pain. Our goal is to help you manage your pain. Pain medicine will help you feel comfortable enough to do activities that will help you heal.  These activities may include breathing exercises, walking and physical therapy.   To help your health care team treat your pain we will ask: 1) If you have pain  2) where it is located 3) describe your pain in your words  Methods of pain control include medications given by mouth, vein or by nerve block for some surgeries.  We may give you a pain control pump that will:  1) Deliver the medicine through a tube placed in your vein  2) Control the amount of medicine you receive  3) Allow you to push a button to deliver a dose of pain medicine  Sequential Compression Device (SCD) or Pneumo Boots:  You may need to wear SCD S on your legs or feet. These are wraps connected to a machine that pumps in air and releases it. The repeated pumping helps prevent blood clots from forming.

## 2019-03-13 ENCOUNTER — APPOINTMENT (OUTPATIENT)
Dept: GENERAL RADIOLOGY | Facility: CLINIC | Age: 39
End: 2019-03-13
Attending: SURGERY
Payer: MEDICAID

## 2019-03-13 ENCOUNTER — ANESTHESIA (OUTPATIENT)
Dept: SURGERY | Facility: CLINIC | Age: 39
End: 2019-03-13
Payer: MEDICAID

## 2019-03-13 ENCOUNTER — HOSPITAL ENCOUNTER (INPATIENT)
Facility: CLINIC | Age: 39
LOS: 4 days | Discharge: HOME | End: 2019-03-17
Attending: SURGERY | Admitting: SURGERY
Payer: MEDICAID

## 2019-03-13 ENCOUNTER — OFFICE VISIT (OUTPATIENT)
Dept: INTERPRETER SERVICES | Facility: CLINIC | Age: 39
End: 2019-03-13
Payer: MEDICAID

## 2019-03-13 DIAGNOSIS — S36.13XD INJURY OF BILE DUCT, SUBSEQUENT ENCOUNTER: Primary | ICD-10-CM

## 2019-03-13 DIAGNOSIS — R52 PAIN: ICD-10-CM

## 2019-03-13 LAB
ABO + RH BLD: NORMAL
ABO + RH BLD: NORMAL
BLD GP AB SCN SERPL QL: NORMAL
BLOOD BANK CMNT PATIENT-IMP: NORMAL
BLOOD BANK CMNT PATIENT-IMP: NORMAL
GLUCOSE BLDC GLUCOMTR-MCNC: 101 MG/DL (ref 70–99)
HCG UR QL: NEGATIVE
SPECIMEN EXP DATE BLD: NORMAL

## 2019-03-13 PROCEDURE — 25000566 ZZH SEVOFLURANE, EA 15 MIN: Performed by: SURGERY

## 2019-03-13 PROCEDURE — 25800030 ZZH RX IP 258 OP 636: Performed by: ANESTHESIOLOGY

## 2019-03-13 PROCEDURE — 25800030 ZZH RX IP 258 OP 636: Performed by: STUDENT IN AN ORGANIZED HEALTH CARE EDUCATION/TRAINING PROGRAM

## 2019-03-13 PROCEDURE — 25000128 H RX IP 250 OP 636: Performed by: NURSE ANESTHETIST, CERTIFIED REGISTERED

## 2019-03-13 PROCEDURE — 71000017 ZZH RECOVERY PHASE 1 LEVEL 3 EA ADDTL HR: Performed by: SURGERY

## 2019-03-13 PROCEDURE — 25500064 ZZH RX 255 OP 636: Performed by: SURGERY

## 2019-03-13 PROCEDURE — 27110038 ZZH RX 271: Performed by: ANESTHESIOLOGY

## 2019-03-13 PROCEDURE — 37000008 ZZH ANESTHESIA TECHNICAL FEE, 1ST 30 MIN: Performed by: SURGERY

## 2019-03-13 PROCEDURE — 81025 URINE PREGNANCY TEST: CPT | Performed by: ANESTHESIOLOGY

## 2019-03-13 PROCEDURE — 25000125 ZZHC RX 250: Performed by: ANESTHESIOLOGY

## 2019-03-13 PROCEDURE — 25000128 H RX IP 250 OP 636: Performed by: SURGERY

## 2019-03-13 PROCEDURE — 12000001 ZZH R&B MED SURG/OB UMMC

## 2019-03-13 PROCEDURE — 25000128 H RX IP 250 OP 636: Performed by: STUDENT IN AN ORGANIZED HEALTH CARE EDUCATION/TRAINING PROGRAM

## 2019-03-13 PROCEDURE — 25000128 H RX IP 250 OP 636: Performed by: ANESTHESIOLOGY

## 2019-03-13 PROCEDURE — 25000132 ZZH RX MED GY IP 250 OP 250 PS 637: Performed by: STUDENT IN AN ORGANIZED HEALTH CARE EDUCATION/TRAINING PROGRAM

## 2019-03-13 PROCEDURE — T1013 SIGN LANG/ORAL INTERPRETER: HCPCS | Mod: U3

## 2019-03-13 PROCEDURE — 40000171 ZZH STATISTIC PRE-PROCEDURE ASSESSMENT III: Performed by: SURGERY

## 2019-03-13 PROCEDURE — 71000016 ZZH RECOVERY PHASE 1 LEVEL 3 FIRST HR: Performed by: SURGERY

## 2019-03-13 PROCEDURE — 36000064 ZZH SURGERY LEVEL 4 EA 15 ADDTL MIN - UMMC: Performed by: SURGERY

## 2019-03-13 PROCEDURE — BF101ZZ FLUOROSCOPY OF BILE DUCTS USING LOW OSMOLAR CONTRAST: ICD-10-PCS | Performed by: SURGERY

## 2019-03-13 PROCEDURE — 40000277 XR SURGERY CARM FLUORO LESS THAN 5 MIN W STILLS: Mod: TC

## 2019-03-13 PROCEDURE — 00000146 ZZHCL STATISTIC GLUCOSE BY METER IP

## 2019-03-13 PROCEDURE — 37000009 ZZH ANESTHESIA TECHNICAL FEE, EACH ADDTL 15 MIN: Performed by: SURGERY

## 2019-03-13 PROCEDURE — 27210794 ZZH OR GENERAL SUPPLY STERILE: Performed by: SURGERY

## 2019-03-13 PROCEDURE — 0JN80ZZ RELEASE ABDOMEN SUBCUTANEOUS TISSUE AND FASCIA, OPEN APPROACH: ICD-10-PCS | Performed by: SURGERY

## 2019-03-13 PROCEDURE — 25000125 ZZHC RX 250: Performed by: NURSE ANESTHETIST, CERTIFIED REGISTERED

## 2019-03-13 PROCEDURE — 36000062 ZZH SURGERY LEVEL 4 1ST 30 MIN - UMMC: Performed by: SURGERY

## 2019-03-13 PROCEDURE — 0F170ZB BYPASS COMMON HEPATIC DUCT TO SMALL INTESTINE, OPEN APPROACH: ICD-10-PCS | Performed by: SURGERY

## 2019-03-13 PROCEDURE — 25800030 ZZH RX IP 258 OP 636: Performed by: SURGERY

## 2019-03-13 RX ORDER — ONDANSETRON 4 MG/1
4 TABLET, ORALLY DISINTEGRATING ORAL EVERY 6 HOURS PRN
Status: DISCONTINUED | OUTPATIENT
Start: 2019-03-13 | End: 2019-03-17 | Stop reason: HOSPADM

## 2019-03-13 RX ORDER — ALBUTEROL SULFATE 0.83 MG/ML
2.5 SOLUTION RESPIRATORY (INHALATION) EVERY 4 HOURS PRN
Status: DISCONTINUED | OUTPATIENT
Start: 2019-03-13 | End: 2019-03-13 | Stop reason: HOSPADM

## 2019-03-13 RX ORDER — FENTANYL CITRATE 50 UG/ML
INJECTION, SOLUTION INTRAMUSCULAR; INTRAVENOUS PRN
Status: DISCONTINUED | OUTPATIENT
Start: 2019-03-13 | End: 2019-03-13

## 2019-03-13 RX ORDER — PIPERACILLIN SODIUM, TAZOBACTAM SODIUM 2; .25 G/10ML; G/10ML
2.25 INJECTION, POWDER, LYOPHILIZED, FOR SOLUTION INTRAVENOUS
Status: DISCONTINUED | OUTPATIENT
Start: 2019-03-13 | End: 2019-03-13 | Stop reason: HOSPADM

## 2019-03-13 RX ORDER — GABAPENTIN 300 MG/1
300 CAPSULE ORAL ONCE
Status: COMPLETED | OUTPATIENT
Start: 2019-03-13 | End: 2019-03-13

## 2019-03-13 RX ORDER — FENTANYL CITRATE 50 UG/ML
25-50 INJECTION, SOLUTION INTRAMUSCULAR; INTRAVENOUS
Status: DISCONTINUED | OUTPATIENT
Start: 2019-03-13 | End: 2019-03-13 | Stop reason: HOSPADM

## 2019-03-13 RX ORDER — LIDOCAINE 40 MG/G
CREAM TOPICAL
Status: DISCONTINUED | OUTPATIENT
Start: 2019-03-13 | End: 2019-03-13 | Stop reason: HOSPADM

## 2019-03-13 RX ORDER — SODIUM CHLORIDE, SODIUM LACTATE, POTASSIUM CHLORIDE, CALCIUM CHLORIDE 600; 310; 30; 20 MG/100ML; MG/100ML; MG/100ML; MG/100ML
INJECTION, SOLUTION INTRAVENOUS CONTINUOUS
Status: DISCONTINUED | OUTPATIENT
Start: 2019-03-13 | End: 2019-03-13 | Stop reason: HOSPADM

## 2019-03-13 RX ORDER — NALOXONE HYDROCHLORIDE 0.4 MG/ML
.1-.4 INJECTION, SOLUTION INTRAMUSCULAR; INTRAVENOUS; SUBCUTANEOUS
Status: DISCONTINUED | OUTPATIENT
Start: 2019-03-13 | End: 2019-03-17 | Stop reason: HOSPADM

## 2019-03-13 RX ORDER — ONDANSETRON 2 MG/ML
INJECTION INTRAMUSCULAR; INTRAVENOUS PRN
Status: DISCONTINUED | OUTPATIENT
Start: 2019-03-13 | End: 2019-03-13

## 2019-03-13 RX ORDER — HYDROMORPHONE HYDROCHLORIDE 1 MG/ML
.3-.5 INJECTION, SOLUTION INTRAMUSCULAR; INTRAVENOUS; SUBCUTANEOUS EVERY 10 MIN PRN
Status: DISCONTINUED | OUTPATIENT
Start: 2019-03-13 | End: 2019-03-13 | Stop reason: HOSPADM

## 2019-03-13 RX ORDER — CEFOXITIN 1 G/1
1 INJECTION, POWDER, FOR SOLUTION INTRAVENOUS
Status: DISCONTINUED | OUTPATIENT
Start: 2019-03-13 | End: 2019-03-13 | Stop reason: DRUGHIGH

## 2019-03-13 RX ORDER — LIDOCAINE 40 MG/G
CREAM TOPICAL
Status: DISCONTINUED | OUTPATIENT
Start: 2019-03-13 | End: 2019-03-17 | Stop reason: HOSPADM

## 2019-03-13 RX ORDER — ONDANSETRON 2 MG/ML
4 INJECTION INTRAMUSCULAR; INTRAVENOUS EVERY 6 HOURS PRN
Status: DISCONTINUED | OUTPATIENT
Start: 2019-03-13 | End: 2019-03-17 | Stop reason: HOSPADM

## 2019-03-13 RX ORDER — NALOXONE HYDROCHLORIDE 0.4 MG/ML
.1-.4 INJECTION, SOLUTION INTRAMUSCULAR; INTRAVENOUS; SUBCUTANEOUS
Status: DISCONTINUED | OUTPATIENT
Start: 2019-03-13 | End: 2019-03-13 | Stop reason: HOSPADM

## 2019-03-13 RX ORDER — LABETALOL 20 MG/4 ML (5 MG/ML) INTRAVENOUS SYRINGE
10
Status: DISCONTINUED | OUTPATIENT
Start: 2019-03-13 | End: 2019-03-13 | Stop reason: HOSPADM

## 2019-03-13 RX ORDER — ONDANSETRON 4 MG/1
4 TABLET, ORALLY DISINTEGRATING ORAL EVERY 30 MIN PRN
Status: DISCONTINUED | OUTPATIENT
Start: 2019-03-13 | End: 2019-03-13 | Stop reason: HOSPADM

## 2019-03-13 RX ORDER — MEPERIDINE HYDROCHLORIDE 50 MG/ML
12.5 INJECTION INTRAMUSCULAR; INTRAVENOUS; SUBCUTANEOUS
Status: DISCONTINUED | OUTPATIENT
Start: 2019-03-13 | End: 2019-03-13 | Stop reason: HOSPADM

## 2019-03-13 RX ORDER — PROPOFOL 10 MG/ML
INJECTION, EMULSION INTRAVENOUS PRN
Status: DISCONTINUED | OUTPATIENT
Start: 2019-03-13 | End: 2019-03-13

## 2019-03-13 RX ORDER — DEXAMETHASONE SODIUM PHOSPHATE 4 MG/ML
INJECTION, SOLUTION INTRA-ARTICULAR; INTRALESIONAL; INTRAMUSCULAR; INTRAVENOUS; SOFT TISSUE PRN
Status: DISCONTINUED | OUTPATIENT
Start: 2019-03-13 | End: 2019-03-13

## 2019-03-13 RX ORDER — ONDANSETRON 2 MG/ML
4 INJECTION INTRAMUSCULAR; INTRAVENOUS EVERY 30 MIN PRN
Status: DISCONTINUED | OUTPATIENT
Start: 2019-03-13 | End: 2019-03-13 | Stop reason: HOSPADM

## 2019-03-13 RX ORDER — SODIUM CHLORIDE, SODIUM LACTATE, POTASSIUM CHLORIDE, CALCIUM CHLORIDE 600; 310; 30; 20 MG/100ML; MG/100ML; MG/100ML; MG/100ML
INJECTION, SOLUTION INTRAVENOUS CONTINUOUS
Status: DISCONTINUED | OUTPATIENT
Start: 2019-03-13 | End: 2019-03-14

## 2019-03-13 RX ORDER — FLUMAZENIL 0.1 MG/ML
0.2 INJECTION, SOLUTION INTRAVENOUS
Status: DISCONTINUED | OUTPATIENT
Start: 2019-03-13 | End: 2019-03-13 | Stop reason: HOSPADM

## 2019-03-13 RX ORDER — LIDOCAINE HYDROCHLORIDE 20 MG/ML
INJECTION, SOLUTION INFILTRATION; PERINEURAL PRN
Status: DISCONTINUED | OUTPATIENT
Start: 2019-03-13 | End: 2019-03-13

## 2019-03-13 RX ORDER — CEFOXITIN 2 G/1
2 INJECTION, POWDER, FOR SOLUTION INTRAVENOUS
Status: DISCONTINUED | OUTPATIENT
Start: 2019-03-13 | End: 2019-03-13

## 2019-03-13 RX ORDER — PIPERACILLIN SODIUM, TAZOBACTAM SODIUM 3; .375 G/15ML; G/15ML
3.38 INJECTION, POWDER, LYOPHILIZED, FOR SOLUTION INTRAVENOUS
Status: COMPLETED | OUTPATIENT
Start: 2019-03-13 | End: 2019-03-13

## 2019-03-13 RX ADMIN — FENTANYL CITRATE 50 MCG: 50 INJECTION, SOLUTION INTRAMUSCULAR; INTRAVENOUS at 09:37

## 2019-03-13 RX ADMIN — DEXAMETHASONE SODIUM PHOSPHATE 8 MG: 4 INJECTION, SOLUTION INTRA-ARTICULAR; INTRALESIONAL; INTRAMUSCULAR; INTRAVENOUS; SOFT TISSUE at 09:45

## 2019-03-13 RX ADMIN — Medication 14 ML/HR: at 13:12

## 2019-03-13 RX ADMIN — PIPERACILLIN AND TAZOBACTAM 2.25 G: 2; .25 INJECTION, POWDER, FOR SOLUTION INTRAVENOUS at 11:38

## 2019-03-13 RX ADMIN — ONDANSETRON 4 MG: 2 INJECTION INTRAMUSCULAR; INTRAVENOUS at 14:30

## 2019-03-13 RX ADMIN — SODIUM CHLORIDE, POTASSIUM CHLORIDE, SODIUM LACTATE AND CALCIUM CHLORIDE: 600; 310; 30; 20 INJECTION, SOLUTION INTRAVENOUS at 09:15

## 2019-03-13 RX ADMIN — Medication 0.3 MG: at 17:00

## 2019-03-13 RX ADMIN — FENTANYL CITRATE 50 MCG: 50 INJECTION, SOLUTION INTRAMUSCULAR; INTRAVENOUS at 08:21

## 2019-03-13 RX ADMIN — ENOXAPARIN SODIUM 40 MG: 40 INJECTION SUBCUTANEOUS at 09:13

## 2019-03-13 RX ADMIN — FENTANYL CITRATE 100 MCG: 50 INJECTION, SOLUTION INTRAMUSCULAR; INTRAVENOUS at 10:51

## 2019-03-13 RX ADMIN — FENTANYL CITRATE 50 MCG: 50 INJECTION, SOLUTION INTRAMUSCULAR; INTRAVENOUS at 09:59

## 2019-03-13 RX ADMIN — PROPOFOL 160 MG: 10 INJECTION, EMULSION INTRAVENOUS at 09:35

## 2019-03-13 RX ADMIN — SODIUM CHLORIDE, POTASSIUM CHLORIDE, SODIUM LACTATE AND CALCIUM CHLORIDE: 600; 310; 30; 20 INJECTION, SOLUTION INTRAVENOUS at 16:10

## 2019-03-13 RX ADMIN — FENTANYL CITRATE 25 MCG: 50 INJECTION, SOLUTION INTRAMUSCULAR; INTRAVENOUS at 16:00

## 2019-03-13 RX ADMIN — GABAPENTIN 300 MG: 300 CAPSULE ORAL at 07:25

## 2019-03-13 RX ADMIN — ROCURONIUM BROMIDE 20 MG: 10 INJECTION INTRAVENOUS at 11:31

## 2019-03-13 RX ADMIN — MIDAZOLAM 1 MG: 1 INJECTION INTRAMUSCULAR; INTRAVENOUS at 09:21

## 2019-03-13 RX ADMIN — ROCURONIUM BROMIDE 20 MG: 10 INJECTION INTRAVENOUS at 12:53

## 2019-03-13 RX ADMIN — SODIUM CHLORIDE, POTASSIUM CHLORIDE, SODIUM LACTATE AND CALCIUM CHLORIDE 500 ML: 600; 310; 30; 20 INJECTION, SOLUTION INTRAVENOUS at 22:58

## 2019-03-13 RX ADMIN — PIPERACILLIN SODIUM AND TAZOBACTAM SODIUM 3.38 G: 3; .375 INJECTION, POWDER, LYOPHILIZED, FOR SOLUTION INTRAVENOUS at 09:39

## 2019-03-13 RX ADMIN — ROCURONIUM BROMIDE 5 MG: 10 INJECTION INTRAVENOUS at 14:26

## 2019-03-13 RX ADMIN — Medication: at 16:34

## 2019-03-13 RX ADMIN — FENTANYL CITRATE 25 MCG: 50 INJECTION, SOLUTION INTRAMUSCULAR; INTRAVENOUS at 14:56

## 2019-03-13 RX ADMIN — SODIUM CHLORIDE, POTASSIUM CHLORIDE, SODIUM LACTATE AND CALCIUM CHLORIDE: 600; 310; 30; 20 INJECTION, SOLUTION INTRAVENOUS at 22:56

## 2019-03-13 RX ADMIN — Medication 100 MG: at 09:35

## 2019-03-13 RX ADMIN — MIDAZOLAM 1 MG: 1 INJECTION INTRAMUSCULAR; INTRAVENOUS at 08:21

## 2019-03-13 RX ADMIN — LIDOCAINE HYDROCHLORIDE 80 MG: 20 INJECTION, SOLUTION INFILTRATION; PERINEURAL at 09:35

## 2019-03-13 RX ADMIN — Medication: at 15:17

## 2019-03-13 RX ADMIN — PIPERACILLIN AND TAZOBACTAM 2.25 G: 2; .25 INJECTION, POWDER, FOR SOLUTION INTRAVENOUS at 13:38

## 2019-03-13 RX ADMIN — SUGAMMADEX 200 MG: 100 INJECTION, SOLUTION INTRAVENOUS at 14:51

## 2019-03-13 RX ADMIN — FENTANYL CITRATE 25 MCG: 50 INJECTION, SOLUTION INTRAMUSCULAR; INTRAVENOUS at 16:22

## 2019-03-13 RX ADMIN — ROCURONIUM BROMIDE 20 MG: 10 INJECTION INTRAVENOUS at 09:59

## 2019-03-13 RX ADMIN — ROCURONIUM BROMIDE 40 MG: 10 INJECTION INTRAVENOUS at 09:38

## 2019-03-13 RX ADMIN — FENTANYL CITRATE 25 MCG: 50 INJECTION, SOLUTION INTRAMUSCULAR; INTRAVENOUS at 14:52

## 2019-03-13 RX ADMIN — ROCURONIUM BROMIDE 20 MG: 10 INJECTION INTRAVENOUS at 12:26

## 2019-03-13 RX ADMIN — ROCURONIUM BROMIDE 20 MG: 10 INJECTION INTRAVENOUS at 10:43

## 2019-03-13 ASSESSMENT — ACTIVITIES OF DAILY LIVING (ADL): ADLS_ACUITY_SCORE: 12

## 2019-03-13 ASSESSMENT — MIFFLIN-ST. JEOR: SCORE: 1451.5

## 2019-03-13 NOTE — OR NURSING
Bilateral erector spinae blocks complete. VSS. Patient position sitting up due to inability to lay prone.

## 2019-03-13 NOTE — ANESTHESIA POSTPROCEDURE EVALUATION
Anesthesia POST Procedure Evaluation    Patient: Bryanna Taylor   MRN:     [id number] Gender:   female   Age:    38 year old :      1980        Preoperative Diagnosis: Bile Duct Injury   Procedure(s):  Open Leila En Y Biliary Reconstruction, Lysis of Adhesions, Intraoperative Ultrasound, Intraoperative Cholangiogram  Laparotomy exploratory   Postop Comments: No value filed.       Anesthesia Type:  General, Regional    Reportable Event: NO     PAIN: Uncomplicated   Sign Out status: Comfortable, Well controlled pain     PONV: No PONV   Sign Out status:  No Nausea or Vomiting     Neuro/Psych: Uneventful perioperative course   Sign Out Status: Preoperative baseline; Age appropriate mentation     Airway/Resp.: Uneventful perioperative course   Sign Out Status: Non labored breathing, age appropriate RR; Resp. Status within EXPECTED Parameters     CV: Uneventful perioperative course   Sign Out status: Appropriate BP and perfusion indices; Appropriate HR/Rhythm     Disposition:   Sign Out in:  PACU  Disposition:  Floor  Recovery Course: Uneventful  Follow-Up: Not required           Last Anesthesia Record Vitals:  CRNA VITALS  3/13/2019 1437 - 3/13/2019 1537      3/13/2019             NIBP:  129/88    Ht Rate:  90    SpO2:  98 %          Last PACU/Preop Vitals:  Vitals:    19 1645 19 1700 19 1715   BP:  (!) 141/102 (!) 138/94   Pulse:  89    Resp: (P) 20 20 20   Temp:      SpO2:  100%          Electronically Signed By: Yamil Bui MD, 2019, 5:20 PM

## 2019-03-13 NOTE — BRIEF OP NOTE
[facility], [place]    Brief Operative Note    Pre-operative diagnosis: Bile Duct Injury  Post-operative diagnosis same  Procedure: Procedure(s):  Open Leila En Y Biliary Reconstruction, Lysis of Adhesions, Intraoperative Ultrasound, Intraoperative Cholangiogram  Laparotomy exploratory  Surgeon: Surgeon(s) and Role:     * Michael Morillo MD - Primary     * Thomas Diez MD - Resident - Assisting  Anesthesia: Combined General with Block   Estimated blood loss: 30ml  Drains: Shantanu-Rushing in infrahepatic space adjacent to hepaticojejunostomy  Specimens: * No specimens in log *  Findings:   common hepatic duct injury at the hilum/bifucation of the left and right hepatic ducts.  Repaired with RNY hepaticojejunostomy.  Old surgical LOREN drain removed and site covered with primapore.  PTC drain placed across hepaticojejunostomy   Complications: None.  Implants: None.    Plan:  - admit to surg onc  - NPO/IVF  - ambulate  - Leave PTC drain capped.  Flush PTC drain with 10ml saline twice daily.  If LOREN drain output becomes bilious then return PTC to gravity drainage  - PCA for pain control

## 2019-03-13 NOTE — PROGRESS NOTES
Destini MACHUCA Turkmen speaking interp present in PACU readily available upon pt arrival from OR.

## 2019-03-13 NOTE — ANESTHESIA CARE TRANSFER NOTE
Patient: Bryanna Taylor    Procedure(s):  Open Leila En Y Biliary Reconstruction, Lysis of Adhesions, Intraoperative Ultrasound, Intraoperative Cholangiogram  Laparotomy exploratory    Diagnosis: Bile Duct Injury  Diagnosis Additional Information: No value filed.    Anesthesia Type:   No value filed.     Note:  Airway :Nasal Cannula  Patient transferred to:PACU  Handoff Report: Identifed the Patient, Identified the Reponsible Provider, Reviewed the pertinent medical history, Discussed the surgical course, Reviewed Intra-OP anesthesia mangement and issues during anesthesia, Set expectations for post-procedure period and Allowed opportunity for questions and acknowledgement of understanding      Vitals: (Last set prior to Anesthesia Care Transfer)    CRNA VITALS  3/13/2019 1437 - 3/13/2019 1511      3/13/2019             Resp Rate (set):  10                Electronically Signed By: PERLA Conley CRNA  March 13, 2019  3:11 PM

## 2019-03-13 NOTE — ANESTHESIA PROCEDURE NOTES
Peripheral Nerve Block Procedure Note    Staff:     Anesthesiologist:  Bob Jenkins MD    Resident/CRNA:  Ritchie Mcgraw MD    Block performed by resident/CRNA in the presence of a teaching physician    Location: Pre-op  Procedure Start/Stop TImes:      3/13/2019 8:14 AM     3/13/2019 9:05 AM    patient identified, IV checked, site marked, risks and benefits discussed, informed consent, monitors and equipment checked, pre-op evaluation, at physician/surgeon's request and post-op pain management      Correct Patient: Yes      Correct Position: Yes      Correct Site: Yes      Correct Procedure: Yes      Correct Laterality:  Yes    Site Marked:  Yes  Procedure details:     Procedure:  Other (erector spinae)    ASA:  3    Diagnosis:  Post operative pain control    Laterality:  Bilateral    Position:  Sitting    Sterile Prep: chloraprep, patient draped, mask and sterile gloves      Local skin infiltration:  1% lidocaine    amount (mL):  3    Insertion Site:  T8-9    Needle:  Touhy needle    Needle gauge:  17    Needle length (mm):  90    Catheter gauge:  19    Catheter threaded easily: Yes      Threaded to cm at skin:  12.5    Ultrasound: Yes      Ultrasound used to identify targeted nerve, plexus, or vascular structure and placed a needle adjacent to it      Permanent Image entered into patiient's record      Abnormal pain on injection: No      Blood Aspirated: No      Paresthesias:  No    Bleeding at site: No      Bolus via:  Catheter    Infusion Method:  Continuous Infusion    Blood aspirated via catheter: No      Secured:  Tegaderm and Dermabond    Complications:  None  Assessment/Narrative:     Injection made incrementally with aspirations every (mL):  5

## 2019-03-14 ENCOUNTER — APPOINTMENT (OUTPATIENT)
Dept: OCCUPATIONAL THERAPY | Facility: CLINIC | Age: 39
End: 2019-03-14
Attending: SURGERY
Payer: MEDICAID

## 2019-03-14 ENCOUNTER — OFFICE VISIT (OUTPATIENT)
Dept: INTERPRETER SERVICES | Facility: CLINIC | Age: 39
End: 2019-03-14
Payer: MEDICAID

## 2019-03-14 ENCOUNTER — APPOINTMENT (OUTPATIENT)
Dept: PHYSICAL THERAPY | Facility: CLINIC | Age: 39
End: 2019-03-14
Attending: SURGERY
Payer: MEDICAID

## 2019-03-14 LAB
ALBUMIN SERPL-MCNC: 2.6 G/DL (ref 3.4–5)
ALP SERPL-CCNC: 178 U/L (ref 40–150)
ALT SERPL W P-5'-P-CCNC: 116 U/L (ref 0–50)
ANION GAP SERPL CALCULATED.3IONS-SCNC: 8 MMOL/L (ref 3–14)
AST SERPL W P-5'-P-CCNC: 57 U/L (ref 0–45)
BILIRUB SERPL-MCNC: 1.6 MG/DL (ref 0.2–1.3)
BUN SERPL-MCNC: 10 MG/DL (ref 7–30)
CALCIUM SERPL-MCNC: 8 MG/DL (ref 8.5–10.1)
CHLORIDE SERPL-SCNC: 108 MMOL/L (ref 94–109)
CO2 SERPL-SCNC: 24 MMOL/L (ref 20–32)
CREAT SERPL-MCNC: 0.58 MG/DL (ref 0.52–1.04)
CREAT SERPL-MCNC: 0.66 MG/DL (ref 0.52–1.04)
ERYTHROCYTE [DISTWIDTH] IN BLOOD BY AUTOMATED COUNT: 13.6 % (ref 10–15)
GFR SERPL CREATININE-BSD FRML MDRD: >90 ML/MIN/{1.73_M2}
GFR SERPL CREATININE-BSD FRML MDRD: >90 ML/MIN/{1.73_M2}
GLUCOSE SERPL-MCNC: 109 MG/DL (ref 70–99)
HCT VFR BLD AUTO: 34.4 % (ref 35–47)
HGB BLD-MCNC: 11.1 G/DL (ref 11.7–15.7)
MAGNESIUM SERPL-MCNC: 1.5 MG/DL (ref 1.6–2.3)
MAGNESIUM SERPL-MCNC: 2.3 MG/DL (ref 1.6–2.3)
MCH RBC QN AUTO: 28.2 PG (ref 26.5–33)
MCHC RBC AUTO-ENTMCNC: 32.3 G/DL (ref 31.5–36.5)
MCV RBC AUTO: 88 FL (ref 78–100)
PHOSPHATE SERPL-MCNC: 4 MG/DL (ref 2.5–4.5)
PLATELET # BLD AUTO: 289 10E9/L (ref 150–450)
PLATELET # BLD AUTO: 312 10E9/L (ref 150–450)
POTASSIUM SERPL-SCNC: 3.2 MMOL/L (ref 3.4–5.3)
POTASSIUM SERPL-SCNC: 3.2 MMOL/L (ref 3.4–5.3)
PROT SERPL-MCNC: 6.3 G/DL (ref 6.8–8.8)
RBC # BLD AUTO: 3.93 10E12/L (ref 3.8–5.2)
SODIUM SERPL-SCNC: 140 MMOL/L (ref 133–144)
WBC # BLD AUTO: 11.9 10E9/L (ref 4–11)

## 2019-03-14 PROCEDURE — T1013 SIGN LANG/ORAL INTERPRETER: HCPCS | Mod: U3

## 2019-03-14 PROCEDURE — 25000128 H RX IP 250 OP 636: Performed by: SURGERY

## 2019-03-14 PROCEDURE — 84132 ASSAY OF SERUM POTASSIUM: CPT | Performed by: SURGERY

## 2019-03-14 PROCEDURE — 40000141 ZZH STATISTIC PERIPHERAL IV START W/O US GUIDANCE

## 2019-03-14 PROCEDURE — 36415 COLL VENOUS BLD VENIPUNCTURE: CPT | Performed by: SURGERY

## 2019-03-14 PROCEDURE — 85027 COMPLETE CBC AUTOMATED: CPT | Performed by: SURGERY

## 2019-03-14 PROCEDURE — 12000001 ZZH R&B MED SURG/OB UMMC

## 2019-03-14 PROCEDURE — 85049 AUTOMATED PLATELET COUNT: CPT | Performed by: SURGERY

## 2019-03-14 PROCEDURE — 83735 ASSAY OF MAGNESIUM: CPT | Performed by: SURGERY

## 2019-03-14 PROCEDURE — 84100 ASSAY OF PHOSPHORUS: CPT | Performed by: SURGERY

## 2019-03-14 PROCEDURE — 25800030 ZZH RX IP 258 OP 636: Performed by: STUDENT IN AN ORGANIZED HEALTH CARE EDUCATION/TRAINING PROGRAM

## 2019-03-14 PROCEDURE — 25800030 ZZH RX IP 258 OP 636: Performed by: SURGERY

## 2019-03-14 PROCEDURE — 25000128 H RX IP 250 OP 636: Performed by: STUDENT IN AN ORGANIZED HEALTH CARE EDUCATION/TRAINING PROGRAM

## 2019-03-14 PROCEDURE — 97535 SELF CARE MNGMENT TRAINING: CPT | Mod: GO

## 2019-03-14 PROCEDURE — 80053 COMPREHEN METABOLIC PANEL: CPT | Performed by: SURGERY

## 2019-03-14 PROCEDURE — 97161 PT EVAL LOW COMPLEX 20 MIN: CPT | Mod: GP

## 2019-03-14 PROCEDURE — 25000132 ZZH RX MED GY IP 250 OP 250 PS 637: Performed by: SURGERY

## 2019-03-14 PROCEDURE — 82565 ASSAY OF CREATININE: CPT | Performed by: SURGERY

## 2019-03-14 PROCEDURE — 97530 THERAPEUTIC ACTIVITIES: CPT | Mod: GO

## 2019-03-14 PROCEDURE — 97165 OT EVAL LOW COMPLEX 30 MIN: CPT | Mod: GO

## 2019-03-14 PROCEDURE — 97530 THERAPEUTIC ACTIVITIES: CPT | Mod: GP

## 2019-03-14 RX ORDER — OXYCODONE HYDROCHLORIDE 5 MG/1
5-10 TABLET ORAL EVERY 4 HOURS PRN
Status: CANCELLED | OUTPATIENT
Start: 2019-03-14

## 2019-03-14 RX ORDER — ACETAMINOPHEN 325 MG/1
975 TABLET ORAL
Status: DISCONTINUED | OUTPATIENT
Start: 2019-03-14 | End: 2019-03-17 | Stop reason: HOSPADM

## 2019-03-14 RX ORDER — HYDROMORPHONE HYDROCHLORIDE 1 MG/ML
.3-.5 INJECTION, SOLUTION INTRAMUSCULAR; INTRAVENOUS; SUBCUTANEOUS EVERY 4 HOURS PRN
Status: CANCELLED | OUTPATIENT
Start: 2019-03-14

## 2019-03-14 RX ORDER — DEXTROSE MONOHYDRATE, SODIUM CHLORIDE, AND POTASSIUM CHLORIDE 50; 1.49; 4.5 G/1000ML; G/1000ML; G/1000ML
INJECTION, SOLUTION INTRAVENOUS CONTINUOUS
Status: DISCONTINUED | OUTPATIENT
Start: 2019-03-14 | End: 2019-03-15

## 2019-03-14 RX ORDER — POTASSIUM CHLORIDE 1.5 G/1.58G
20-40 POWDER, FOR SOLUTION ORAL
Status: DISCONTINUED | OUTPATIENT
Start: 2019-03-14 | End: 2019-03-17 | Stop reason: HOSPADM

## 2019-03-14 RX ORDER — POTASSIUM CHLORIDE 7.45 MG/ML
10 INJECTION INTRAVENOUS
Status: DISCONTINUED | OUTPATIENT
Start: 2019-03-14 | End: 2019-03-17 | Stop reason: HOSPADM

## 2019-03-14 RX ORDER — POTASSIUM CHLORIDE 750 MG/1
20-40 TABLET, EXTENDED RELEASE ORAL
Status: DISCONTINUED | OUTPATIENT
Start: 2019-03-14 | End: 2019-03-17 | Stop reason: HOSPADM

## 2019-03-14 RX ORDER — POTASSIUM CL/LIDO/0.9 % NACL 10MEQ/0.1L
10 INTRAVENOUS SOLUTION, PIGGYBACK (ML) INTRAVENOUS
Status: DISPENSED | OUTPATIENT
Start: 2019-03-14 | End: 2019-03-14

## 2019-03-14 RX ORDER — POTASSIUM CHLORIDE 29.8 MG/ML
20 INJECTION INTRAVENOUS
Status: DISCONTINUED | OUTPATIENT
Start: 2019-03-14 | End: 2019-03-17 | Stop reason: HOSPADM

## 2019-03-14 RX ORDER — POTASSIUM CL/LIDO/0.9 % NACL 10MEQ/0.1L
10 INTRAVENOUS SOLUTION, PIGGYBACK (ML) INTRAVENOUS
Status: DISCONTINUED | OUTPATIENT
Start: 2019-03-14 | End: 2019-03-17 | Stop reason: HOSPADM

## 2019-03-14 RX ADMIN — Medication 10 MEQ: at 15:59

## 2019-03-14 RX ADMIN — POTASSIUM CHLORIDE, DEXTROSE MONOHYDRATE AND SODIUM CHLORIDE: 150; 5; 450 INJECTION, SOLUTION INTRAVENOUS at 08:12

## 2019-03-14 RX ADMIN — Medication 10 MEQ: at 22:49

## 2019-03-14 RX ADMIN — ACETAMINOPHEN 975 MG: 325 TABLET, FILM COATED ORAL at 18:13

## 2019-03-14 RX ADMIN — MAGNESIUM SULFATE HEPTAHYDRATE 3 G: 500 INJECTION, SOLUTION INTRAMUSCULAR; INTRAVENOUS at 18:16

## 2019-03-14 RX ADMIN — ACETAMINOPHEN 975 MG: 325 TABLET, FILM COATED ORAL at 08:12

## 2019-03-14 RX ADMIN — Medication 10 MEQ: at 09:59

## 2019-03-14 RX ADMIN — SODIUM CHLORIDE, POTASSIUM CHLORIDE, SODIUM LACTATE AND CALCIUM CHLORIDE 500 ML: 600; 310; 30; 20 INJECTION, SOLUTION INTRAVENOUS at 01:39

## 2019-03-14 RX ADMIN — Medication 10 MEQ: at 12:47

## 2019-03-14 RX ADMIN — Medication 10 MEQ: at 11:19

## 2019-03-14 RX ADMIN — ENOXAPARIN SODIUM 40 MG: 40 INJECTION SUBCUTANEOUS at 13:06

## 2019-03-14 RX ADMIN — Medication 10 MEQ: at 17:06

## 2019-03-14 RX ADMIN — ACETAMINOPHEN 975 MG: 325 TABLET, FILM COATED ORAL at 13:06

## 2019-03-14 ASSESSMENT — ACTIVITIES OF DAILY LIVING (ADL)
ADLS_ACUITY_SCORE: 14

## 2019-03-14 ASSESSMENT — PAIN DESCRIPTION - DESCRIPTORS
DESCRIPTORS: DULL;DISCOMFORT
DESCRIPTORS: DULL;DISCOMFORT
DESCRIPTORS: SORE;DISCOMFORT

## 2019-03-14 NOTE — PROGRESS NOTES
" 03/14/19 1632   Quick Adds   Type of Visit Initial Occupational Therapy Evaluation   Living Environment   Lives With spouse;child(chase), dependent   Living Arrangements house   Transportation Anticipated family or friend will provide   Living Environment Comment Pt with all needs met on one level. Reports having tub shower. Pt lives with behzad and 3 children (3 m.o., 8 y.o. and 17 y.o.)   Self-Care   Usual Activity Tolerance good   Current Activity Tolerance moderate   Regular Exercise No   Equipment Currently Used at Home none   Functional Level   Ambulation 0-->independent   Transferring 0-->independent   Toileting 0-->independent   Bathing 0-->independent   Dressing 0-->independent   Eating 0-->independent   Communication 0-->understands/communicates without difficulty   Swallowing 0-->swallows foods/liquids without difficulty   Cognition 0 - no cognition issues reported   Fall history within last six months no   Which of the above functional risks had a recent onset or change? ambulation;transferring;toileting;bathing;dressing   Prior Functional Level Comment Pt PLOF: IND with I/ADLs   General Information   Onset of Illness/Injury or Date of Surgery - Date 03/13/19   Referring Physician Thomas Diez MD   Patient/Family Goals Statement return home   Additional Occupational Profile Info/Pertinent History of Current Problem \"38 year old woman with a h/o bile duct transection during a prior lap sheryl 1/2019 s/p prior PTC tube now s/p Leila-en-Y hepaticojejunostomy on 3/13/2019 \"   Precautions/Limitations fall precautions;abdominal precautions   Weight-Bearing Status - LUE partial weight-bearing (% in comments)  (<10#)   Weight-Bearing Status - RUE partial weight-bearing (% in comments)  (<10#)   Weight-Bearing Status - LLE full weight-bearing   Weight-Bearing Status - RLE full weight-bearing   General Info Comments Activity: up with assist   Cognitive Status Examination   Orientation orientation to person, " place and time   Cognitive Comment No cognitive concerns noted   Visual Perception   Visual Perception No deficits were identified   Sensory Examination   Sensory Quick Adds No deficits were identified   Integumentary/Edema   Integumentary/Edema no deficits were identifed   Posture   Posture not impaired   Range of Motion (ROM)   ROM Comment BUE ROM WNL   Strength   Strength Comments MMT: Not formally tested 2/2 abdominal precautiosn   Coordination   Upper Extremity Coordination No deficits were identified   Mobility   Bed Mobility Bed mobility skill: Supine to sit;Bed mobility skill: Sit to supine   Bed Mobility Skill: Sit to Supine   Level of [place]: Sit/Supine minimum assist (75% patients effort)   Physical Assist/Nonphysical Assist: Sit/Supine verbal cues   Bed Mobility Skill: Supine to Sit   Level of [place]: Supine/Sit minimum assist (75% patients effort)   Physical Assist/Nonphysical Assist: Supine/Sit verbal cues   Assistive Device: Supine/Sit bedrail   Transfer Skill: Sit to Stand   Level of [place]: Sit/Stand stand-by assist   Balance   Balance Comments Pt steady on feet with FWW   Lower Body Dressing   Level of [place]: Dress Lower Body maximum assist (25% patients effort)   Toileting   Level of [place]: Toilet maximum assist (25% patients effort)   Activities of Daily Living Analysis   Impairments Contributing to Impaired Activities of Daily Living fear and anxiety;pain;post surgical precautions;strength decreased   General Therapy Interventions   Planned Therapy Interventions ADL retraining;IADL retraining;strengthening;transfer training;progressive activity/exercise;risk factor education   Clinical Impression   Criteria for Skilled Therapeutic Interventions Met yes, treatment indicated   OT Diagnosis decreased IND in I/ADLs   Influenced by the following impairments pain, post-op deconditioning, surgical precautions   Assessment of Occupational Performance 3-5 Performance  "Deficits   Identified Performance Deficits , dressing, bathing, toileting, functional mobility   Clinical Decision Making (Complexity) Low complexity   Therapy Frequency (6x/week)   Predicted Duration of Therapy Intervention (days/wks) 1 week   Anticipated Discharge Disposition Home with Assist   Risks and Benefits of Treatment have been explained. Yes   Patient, Family & other staff in agreement with plan of care Yes   [facility] TM \"6 Clicks\"   2016, Trustees of [facility], under license to [employer].  All rights reserved.   6 Clicks Short Forms Daily Activity Inpatient Short Form   [facility]  \"6 Clicks\" Daily Activity Inpatient Short Form   1. Putting on and taking off regular lower body clothing? 2 - A Lot   2. Bathing (including washing, rinsing, drying)? 2 - A Lot   3. Toileting, which includes using toilet, bedpan or urinal? 2 - A Lot   4. Putting on and taking off regular upper body clothing? 3 - A Little   5. Taking care of personal grooming such as brushing teeth? 4 - None   6. Eating meals? 4 - None   Daily Activity Raw Score (Score out of 24.Lower scores equate to lower levels of function) 17   Total Evaluation Time   Total Evaluation Time (Minutes) 10     "

## 2019-03-14 NOTE — PLAN OF CARE
OT 7C  Discharge Planner OT   Patient plan for discharge: home   Current status: Pt requiring min A for bed mobility supine <> sitting. SBA for STS and for functional mobility tolerating ~400 feet with SBA and FWW given assist for management of lines/drains. Pt requiring max A for LB dressing and pericares due to pain this date and inability to tolerate sitting.   Barriers to return to prior living situation: medical status and post-op precautions   Recommendations for discharge: Home with assist   Rationale for recommendations: Pt below baseline with ADLs but anticipate continued progress and will be safe to discharge home. Pt reports will have assist at home.       Entered by: Osiris Perez 03/14/2019 4:26 PM

## 2019-03-14 NOTE — PLAN OF CARE
Pt is [place] speaking and uses a . Pt is A &O. VSS. Denies n/v. Pt managing pain with PCA and ONQ pump. LOREN drain with sanguinous drainage. Biliary drain with greenish brown draining. Drain flushed with saline. LR at 125mL/hr. LR Bolus administered for low urine output in the cordero. MD notified. Pt dangled at edge of bed, tolerated ok with a little dizziness.  Pt used IS with encouragement. Calls appropriately. Continue POC.        Information:     Name: Mona Eller   ID #[id number]  Agency: [facility]   Purpose: Mona interpreted for RN cares and to contact the Pt's .

## 2019-03-14 NOTE — PROGRESS NOTES
REGIONAL ANESTHESIA PAIN SERVICE CONTINUOUS NERVE INFUSION NOTE  Bryanna Taylor is a 38 year old female POD #1 s/p SOL EN Y BOWEL and placement of bilateral T8-9 erector spinae (ES) catheters for pain control.     SUBJECTIVE:  Interval History: Overnight events: Pt received fluid bolus for low urine output.  Patient reports adequate pain control with nerve block continuous infusion and current analgesics (see below).  Denies weakness, paresthesias, circumoral numbness, metallic taste or tinnitus. Patient is ambulating with PT.  Currently NPO, denies nausea or vomiting.                 Clinically Aligned Pain Assessment (CAPA):   Comfort (How is your pain?): Tolerable with discomfort  Change in Pain (Since your last medication/intervention?): About the same  Pain Control (How are your pain treatments working?):  Partially effective pain control  Functioning (Are you able to do activities to get better?) : Can do most things, but pain gets in the way of some   Sleep (Does your pain management allow you to sleep or rest?): Awake with occasional pain                 Pain Intensity using Numerical Rating Scale (NRS):  5/10 at rest and 4/10 with activity        Antithrombotic/Thrombolytic Therapy ordered: Lovenox 40mg subcutaneous q 24hrs  Analgesic Medications:               Medications related to Pain Management (From now, onward)     Start     Dose/Rate Route Frequency Ordered Stop     03/13/19 1800   lidocaine 1 % 0.1-1 mL      0.1-1 mL Other EVERY 1 HOUR PRN 03/13/19 1800       03/13/19 1800   lidocaine (LMX4) cream        Topical EVERY 1 HOUR PRN 03/13/19 1800       03/13/19 1530   HYDROmorphone (DILAUDID) PCA 1 mg/mL OPIOID NAIVE        Intravenous CONTINUOUS 03/13/19 1522       03/13/19 0945   ROPivacaine 0.2% (NAROPIN) 750 mL in ON-Q C-Bloc select flow (OQ0133 holds 600-750 mL) dual cath disposable pump      7 mL/hr  Irrigation Continuous Nerve Block 03/13/19 0941               OBJECTIVE:  Lab results        Recent Labs   Lab Test 03/14/19  0002 03/07/19  1009   WBC  --  6.2   RBC  --  4.20   HGB  --  11.4*   HCT  --  37.1   MCV  --  88   MCH  --  27.1   MCHC  --  30.7*   RDW  --  13.3    278               Lab Results   Component Value Date     INR 1.11 03/07/2019     INR 1.09 02/03/2019     INR 0.94 01/14/2019            Vitals:    Temp:  [97  F (36.1  C)-98.5  F (36.9  C)] 97.4  F (36.3  C)  Pulse:  [72-94] 94  Heart Rate:  [] 97  Resp:  [14-25] 22  BP: (112-146)/() 112/66  SpO2:  [95 %-100 %] 97 %     Exam:   GEN: alert and no distress, answers questions appropriately with use of   NEURO/MSK: Strength B/L LE 5/5  and overall symmetric  SKIN: bilateral erector spinae (ES) catheter site with dressing c/d/i, no tenderness, erythema, heme, edema        ASSESSMENT/PLAN:    Patient is receiving adequate analgesia with current multimodal therapy including bilateral T8-9 erector spinae (ES) catheters with infusion of Ropivacaine 0.2%  at 14mL/hr, 7mL/hr each catheter.  Observed pt work with PT NAD.  No evidence of adverse side effects related to local anesthetic. Pt using PCA HYDROmorphone appropriately.     - 0835 continue Ropivacaine 0.2% infusion rate at 14mL/hr, 7mL/hr each catheter, POD #1  - expected change of next On-Q pump is 2 days  - antithrombotic/thrombolytic therapy: ok to continue lovenox 40mg subcutaneous q 24hrs as ordered. Please contact [facility]APS (#[phone]) prior to any medication changes  - will continue to follow and adjust as needed     Ritchie Mcgraw MD on 3/14/2019 at 4:58 PM  [facility]APS Fellow     [facility]APS Contact Info (24 hour job code pager is the last 4 digits) For in-house use only:   [employer] phone: [place] [phone], [place] [phone], [employer] [phone], then enter call-back number.    Text: Use [employer] on the Intranet <Paging/Directory> tab and enter Jobcode ID.   If no call back at any time, contact the hospital  and ask for RAPS attending or backup

## 2019-03-14 NOTE — PLAN OF CARE
Pt POD1. VSS on room air. Potassium replacement started today. Magnesium still to be done. Two 10 mEq bags of potassium still to be hung. Pt pain being managed by PCA dilaudid and OnQ pump at 14cc. LOREN with small amounts of serosanguinous output. Stripped this shift. Biliary drain clamped. Flushed with 10cc saline once. Will need to be done again evening shift. Cordero with adequate output. PRN flush because cordero occluded last night. Abdominal dressing CDI. Denies passing gas and tolerating clear liquid diet. Up with assist of 1.

## 2019-03-14 NOTE — PLAN OF CARE
Discharge Planner PT   Patient plan for discharge: Home   Current status: PT evaluation complete, treatment initiated. Patient Phuong for rolling, Phuong for supine<>sit. Patient with good sitting and standing balance. Patient sit<>Stand with CGA-SBA throughout. Patient ambulated 500' with FWW, SBA, 2.0L, VSS. Patient MaxAx2 for boosting in bed at end of session. Educated on PT role, POC and abdominal precautions.  Barriers to return to prior living situation: Medical, decreased activity tolerance, pain, bed mobility, transfers  Recommendations for discharge: Home with assist  Rationale for recommendations: Patient mobilizing well, was previously IND with mobility. Will be safe to discharge home with assist from family.       Entered by: Pamela Ortiz 03/14/2019 12:23 PM

## 2019-03-14 NOTE — PROGRESS NOTES
SURGICAL ONCOLOGY PROGRESS NOTE  03/14/2019   POD#1     Subjective/Interval Events  NAEON. Cordero not draining overnight, given 1L LR bolus and cordero flushed with immediate output of urine. Pain controlled on PCA. No n/v. No flatus, no BM. Has dangled at side of bed. No other complaints.      Objective  Temp:  [97  F (36.1  C)-98.5  F (36.9  C)] 97.4  F (36.3  C)  Pulse:  [72-94] 94  Heart Rate:  [] 97  Resp:  [14-25] 22  BP: (112-146)/() 112/66  SpO2:  [95 %-100 %] 97 %    General: AAOx4, NAD, laying comfortably in bed  CV: warm, well perfused  Pulm: nonlabored breathing on NC  Abdomen: soft, non-distended, appropriately tender, no rebound or guarding; incision c/d/i; LOREN with serosanguinous output, PTC drain with bilious output  : Cordero draining clear yellow urine  Extremities: no edema, moving all spontaneously and without apparent deficit    I/O last 3 completed shifts:  In: 2652.08 [I.V.:2652.08]  Out: 2250 [Urine:1625; Drains:595; Blood:30]    Labs:  Recent Labs   Lab 03/14/19  0002 03/07/19  1009   WBC  --  6.2   HGB  --  11.4*    278     Recent Labs   Lab 03/14/19  0002 03/07/19  1009   NA  --  136   POTASSIUM  --  4.0   CHLORIDE  --  105   CO2  --  27   BUN  --  7   CR 0.58 0.53   GLC  --  88   AZEB  --  8.3*     Recent Labs   Lab 03/07/19  1009   AST 35   *   ALKPHOS 402*   BILITOTAL 0.4   ALBUMIN 3.2*   INR 1.11          Assessment/Plan  38 year old woman with a h/o bile duct transection during a prior lap sheryl 1/2019 s/p prior PTC tube now s/p Leila-en-Y hepaticojejunostomy on 3/13/2019 with Dr. Morillo.    Neuro:   - Tylenol 975 TID  - dilaudid PCA until tolerating regular diet    CV: HD stable, no acute issues    Resp: satting on NC, wean O2, encourage IS    GI/FEN:    - advance to CLD  - MIVF @ 75ml/hr  - replete lytes prn  - cap PTC, flush BID with 10mL  - LOREN to bulb suction    : UOP adequate, cordero to remain until POD#2 for strict I&Os    ID: no issues    Heme: Hgb pending,  monitor    Endo: no issues     Ppx: Lovenox to start pending Hgb/Cr, OOB, IS  Activity: ambulate QID, PT/OT  Dispo: floor    Disposition Plan   Expected discharge in 3-4 days to prior living arrangement pending clinical course.  Discharge needs:   - Will not need Lovenox injections on discharge.  - Unclear if will discharge with LOREN and PTC (teaching ordered); will continue to assess.     Entered: Lor Marroquin 03/14/2019, 7:26 AM         Patient and plan discussed with chief resident Dr. Diez, who will discuss with staff Dr. Morillo.  - - - - - - - - - - - - - - - - - -  Lor Marroquin MD  General Surgery PGY-3  See [facility] for on call information prior to paging me directly. Pager [phone].

## 2019-03-14 NOTE — PROGRESS NOTES
03/14/19 0900   Quick Adds   Type of Visit Initial PT Evaluation       Present yes   Language [place]   Living Environment   Lives With child(chase), dependent;significant other   Living Arrangements house   Home Accessibility wheelchair accessible   Transportation Anticipated family or friend will provide   Living Environment Comment Patient lives in a house, no stairs to enter or within, with her  and 3 children (3 m.o., 8yr and 17yr). Patient reports there will be someone home with her after discharge to help her.   Self-Care   Usual Activity Tolerance good   Current Activity Tolerance moderate   Regular Exercise No   Equipment Currently Used at Home none   Functional Level Prior   Ambulation 0-->independent   Transferring 0-->independent   Toileting 0-->independent   Bathing 0-->independent   Communication 0-->understands/communicates without difficulty   Swallowing 0-->swallows foods/liquids without difficulty   Cognition 0 - no cognition issues reported   Fall history within last six months no   Which of the above functional risks had a recent onset or change? transferring   Prior Functional Level Comment PT: IND with all mobility and ADLs prior to admission.   General Information   Onset of Illness/Injury or Date of Surgery - Date 03/13/19   Referring Physician Thomas Diez MD   Patient/Family Goals Statement To go home after discharge   Pertinent History of Current Problem (include personal factors and/or comorbidities that impact the POC) s/p Open Leila En Y Biliary Reconstruction, Lysis of Adhesions, Intraoperative Ultrasound, Intraoperative Cholangiogram , Laparotomy exploratory on 3/13. PMH:    Precautions/Limitations abdominal precautions   Weight-Bearing Status - LUE other (see comments)  (No push/pull/ lift x>10lbs.)   Weight-Bearing Status - RUE other (see comments)  (No push/pull/ lift x>10lbs.)   Heart Disease Risk Factors Overweight;Lack of physical activity;High  blood pressure   General Observations PT: Longo, LOREN drain, intrathecal pain management, PCA, PIV in RUE/ LUE, 1.5L O2, Capno   General Info Comments Activity: up with assist until IND   Cognitive Status Examination   Orientation orientation to person, place and time   Level of Consciousness alert   Follows Commands and Answers Questions 100% of the time   Personal Safety and Judgment intact   Memory intact   Pain Assessment   Patient Currently in Pain Yes, see Vital Sign flowsheet   Integumentary/Edema   Integumentary/Edema no deficits were identifed   Posture    Posture Forward head position;Protracted shoulders   Posture Comments PT: Forward flexed posutre for guarding 2/2 pain.   Range of Motion (ROM)   ROM Comment PT: Not formally assessed, WNL for bed mobiltiy, transfers and gait.   Strength   Strength Comments PT: Not formally assessed, at least 3+/5 globally per observation of bed mobility, transfers and gait.   Bed Mobility   Bed Mobility Comments PT: Patient transfered supine<>sit with HOB elevated and Phuong, v florecita motivated to do it as independently as possible. MaxA for boosting in bed. Reports some dizziness with transitions that resolves appropriately.   Transfer Skills   Transfer Comments PT: Patient transfers sit<>stand x4 during session with CGA. Increased pain with STS.   Gait   Gait Comments PT: Patient ambulated ~500' with FWW, CGA-SBA and 2L O2, VSS throughout. Patient declied ambulation with unilateral UE support on IV pole or no AD.    Balance   Balance Comments PT: Sitting balance is safe and stable. Standing balance with FWW and SBA is safe and stable.    Sensory Examination   Sensory Perception no deficits were identified   Modality Interventions   Planned Modality Interventions Comments PT: Ice and abdominal binder as needed for comfort   General Therapy Interventions   Planned Therapy Interventions bed mobility training;gait training;transfer training   Clinical Impression   Criteria for  "Skilled Therapeutic Intervention yes, treatment indicated   PT Diagnosis Impaired functional mobility   Influenced by the following impairments Pain, abdominal precautions, decreased activity tolerance.   Functional limitations due to impairments Bed mobility, transfers and gait.    Clinical Presentation Stable/Uncomplicated   Clinical Presentation Rationale Patient motivated, currently moving well and previously IND. Clinical judgement.   Clinical Decision Making (Complexity) Low complexity   Therapy Frequency` 5 times/week   Predicted Duration of Therapy Intervention (days/wks) 6 days   Anticipated Discharge Disposition Home with Assist   Risk & Benefits of therapy have been explained Yes   Patient, Family & other staff in agreement with plan of care Yes   [facility] TM \"6 Clicks\"   2016, Trustees of [facility], under license to [employer].  All rights reserved.   6 Clicks Short Forms Basic Mobility Inpatient Short Form   [facility]  \"6 Clicks\" V.2 Basic Mobility Inpatient Short Form   1. Turning from your back to your side while in a flat bed without using bedrails? 3 - A Little   2. Moving from lying on your back to sitting on the side of a flat bed without using bedrails? 3 - A Little   3. Moving to and from a bed to a chair (including a wheelchair)? 3 - A Little   4. Standing up from a chair using your arms (e.g., wheelchair, or bedside chair)? 3 - A Little   5. To walk in hospital room? 4 - None   6. Climbing 3-5 steps with a railing? 3 - A Little   Basic Mobility Raw Score (Score out of 24.Lower scores equate to lower levels of function) 19   Total Evaluation Time   Total Evaluation Time (Minutes) 10     "

## 2019-03-14 NOTE — PROGRESS NOTES
SURGERY POST-OP CHECK NOTE  03/13/2019 8:39 PM    Patient: Bryanna Taylor  MRN: [id number]    Subjective  No acute events postoperatively. Pain well controlled. Denies nausea, vomiting, chest pain, shortness of breath. Patient reports tolerating ice chips well and being ready to get up and walk around. No other complaints.    Objective  Temp:  [97  F (36.1  C)-98.3  F (36.8  C)] 97  F (36.1  C)  Pulse:  [72-94] 94  Heart Rate:  [72-94] 94  Resp:  [14-25] 24  BP: (115-146)/() 132/86  SpO2:  [96 %-100 %] 98 %    General: AAOx4, NAD, lying in bed, appears comfortable  Pulm: breathing comfortably on 2L NC  Abd: soft, appropriately tender to palpation near incision site, non-distended, midline laparotmy incision c/d/i with drain in place on RUQ, sero-sanguinous fluid  Extremities: warm, well-perfused without edema, SCDs in place  Neuro: facial movement grossly symmetric, moving all extremities spontaneously without apparent deficit    UOP postop:  25ml via cordero + 0ml in bag not yet recorded (just emptied)      Assessment/Plan  Bryanna Taylor is a 38 year old female with a h/o intra-operative common bile duct transection during laparoscopic cholecystectomy s/p PTC drain placement POD#0 s/p open andrew en y biliary hepatojejunostomy with PTC drain placement, doing well.     Neuro:      - Pain: PCA dilaudid at 0.2mg, ON-Q  CV: HDS, no issues  Pulm: satting 98% on 2L NC, encourage IS  FEN/GI:      - IVF: LR at 125mL/hr     - Diet: NPO except for ice chips, meds     - Bowel regimen: none     - Nausea control: ondansetron 4mg Q6H PRN     - CMP in am  Renal: Watch urine output, Cordero in place  Heme: no s/sx bleeding, CBC in am  ID: afebrile, no leukocytosis, no abx  PPx: OOB, OT/PT, IS, SCDs, Lovenox to start in am  Dispo: floor    Patient seen with resident   Gladys Parkinson MS3    I agree with the above assessment and plan.    Geeta Boatman, PGY1  Surgery

## 2019-03-14 NOTE — PROGRESS NOTES
REGIONAL ANESTHESIA PAIN SERVICE CONTINUOUS NERVE INFUSION NOTE  Bryanna Taylor is a 38 year old female POD #1 s/p SOL EN Y BOWEL and placement of bilateral T8-9 erector spinae (ES) catheters for pain control.    SUBJECTIVE:  Interval History: Overnight events: Pt received fluid bolus for low urine output.  Patient reports adequate pain control with nerve block continuous infusion and current analgesics (see below).  Denies weakness, paresthesias, circumoral numbness, metallic taste or tinnitus. Patient is ambulating with PT.  Currently NPO, denies nausea or vomiting.     Clinically Aligned Pain Assessment (CAPA):   Comfort (How is your pain?): Tolerable with discomfort  Change in Pain (Since your last medication/intervention?): About the same  Pain Control (How are your pain treatments working?):  Partially effective pain control  Functioning (Are you able to do activities to get better?) : Can do most things, but pain gets in the way of some   Sleep (Does your pain management allow you to sleep or rest?): Awake with occasional pain     Pain Intensity using Numerical Rating Scale (NRS):  5/10 at rest and 4/10 with activity      Antithrombotic/Thrombolytic Therapy ordered: Lovenox 40mg subcutaneous q 24hrs  Analgesic Medications:   Medications related to Pain Management (From now, onward)    Start     Dose/Rate Route Frequency Ordered Stop    03/13/19 1800  lidocaine 1 % 0.1-1 mL      0.1-1 mL Other EVERY 1 HOUR PRN 03/13/19 1800      03/13/19 1800  lidocaine (LMX4) cream       Topical EVERY 1 HOUR PRN 03/13/19 1800      03/13/19 1530  HYDROmorphone (DILAUDID) PCA 1 mg/mL OPIOID NAIVE       Intravenous CONTINUOUS 03/13/19 1522      03/13/19 0945  ROPivacaine 0.2% (NAROPIN) 750 mL in ON-Q C-Bloc select flow ([id number] holds 600-750 mL) dual cath disposable pump      7 mL/hr  Irrigation Continuous Nerve Block 03/13/19 0941             OBJECTIVE:  Lab results  Recent Labs   Lab Test 03/14/19  0002 03/07/19  1009    WBC  --  6.2   RBC  --  4.20   HGB  --  11.4*   HCT  --  37.1   MCV  --  88   MCH  --  27.1   MCHC  --  30.7*   RDW  --  13.3    278       Lab Results   Component Value Date    INR 1.11 03/07/2019    INR 1.09 02/03/2019    INR 0.94 01/14/2019         Vitals:    Temp:  [97  F (36.1  C)-98.5  F (36.9  C)] 97.4  F (36.3  C)  Pulse:  [72-94] 94  Heart Rate:  [] 97  Resp:  [14-25] 22  BP: (112-146)/() 112/66  SpO2:  [95 %-100 %] 97 %    Exam:   GEN: alert and no distress, answers questions appropriately with use of   NEURO/MSK: Strength B/L LE 5/5  and overall symmetric  SKIN: bilateral erector spinae (ES) catheter site with dressing c/d/i, no tenderness, erythema, heme, edema      ASSESSMENT/PLAN:    Patient is receiving adequate analgesia with current multimodal therapy including bilateral T8-9 erector spinae (ES) catheters with infusion of Ropivacaine 0.2%  at 14mL/hr, 7mL/hr each catheter.  Observed pt work with PT NAD.  No evidence of adverse side effects related to local anesthetic. Pt using PCA HYDROmorphone appropriately.    - 0835 continue Ropivacaine 0.2% infusion rate at 14mL/hr, 7mL/hr each catheter, POD #1  - expected change of next On-Q pump is 2 days  - antithrombotic/thrombolytic therapy: ok to continue lovenox 40mg subcutaneous q 24hrs as ordered. Please contact RAPS (#[phone]) prior to any medication changes  - will continue to follow and adjust as needed    - discussed plan with attending anesthesiologist    PERLA Sherman CNP  Regional Anesthesia Pain Service  3/14/2019 6:46 AM    RAPS Contact Info (24 hour job code pager is the last 4 digits) For in-house use only:   [facility] phone: [place] [phone], [place] [phone], [employer] [phone], then enter call-back number.    Text: Use [employer] on the Intranet <Paging/Directory> tab and enter Jobcode ID.   If no call back at any time, contact the hospital  and ask for RAPS attending or backup

## 2019-03-14 NOTE — PLAN OF CARE
"Pt AVSS. Capno WNL. Pt pain well managed with dilaudid pca at 0.2mg q 10 min. OnQ at 14ml/hr total dose. Dressings D/I on back. Pt stated her pain was ok she felt more pressure in her bladder and felt like she had \"to pee.\" Pt asst'd to stand at bedside for better drge-no difference noted. Pt had received 500 LR bolus due to low uv's earlier on eves and a repeat bolus given at 0145. Pt bladder scanned for 702cc. Cordero cath flushed with 10cc NS and immediate return of 300cc clear yellow uo noted. Pt felt better within minutes and was able to sleep. Abd obese, midline dermabond D/I. BS hypo. Pt denied any c/o's n/v. LOREN intact with mod amts s/s output. Bili drain intact with mod amts clear brown output. NPO x chips. Mitzi well. Cont to maintain pain control. Enc increased activity today to promote return of bowel fxn.    Addendum: Pain cont to be well under control. Pt used a total of 10 doses from pca dilaudid. Pt began to have the sensation of needing to void, uv decreased in cordero. 10cc NS flush applied to cordero and immediate return of 800cc clear lacho uo. Cont to monitor uo via cordero.   "

## 2019-03-15 ENCOUNTER — APPOINTMENT (OUTPATIENT)
Dept: OCCUPATIONAL THERAPY | Facility: CLINIC | Age: 39
End: 2019-03-15
Attending: SURGERY
Payer: MEDICAID

## 2019-03-15 ENCOUNTER — OFFICE VISIT (OUTPATIENT)
Dept: INTERPRETER SERVICES | Facility: CLINIC | Age: 39
End: 2019-03-15
Payer: MEDICAID

## 2019-03-15 LAB
ALBUMIN SERPL-MCNC: 2.6 G/DL (ref 3.4–5)
ALP SERPL-CCNC: 174 U/L (ref 40–150)
ALT SERPL W P-5'-P-CCNC: 95 U/L (ref 0–50)
ANION GAP SERPL CALCULATED.3IONS-SCNC: 9 MMOL/L (ref 3–14)
AST SERPL W P-5'-P-CCNC: 44 U/L (ref 0–45)
BILIRUB SERPL-MCNC: 0.8 MG/DL (ref 0.2–1.3)
BUN SERPL-MCNC: 5 MG/DL (ref 7–30)
CALCIUM SERPL-MCNC: 8.2 MG/DL (ref 8.5–10.1)
CHLORIDE SERPL-SCNC: 104 MMOL/L (ref 94–109)
CO2 SERPL-SCNC: 27 MMOL/L (ref 20–32)
CREAT SERPL-MCNC: 0.63 MG/DL (ref 0.52–1.04)
ERYTHROCYTE [DISTWIDTH] IN BLOOD BY AUTOMATED COUNT: 13.5 % (ref 10–15)
GFR SERPL CREATININE-BSD FRML MDRD: >90 ML/MIN/{1.73_M2}
GLUCOSE SERPL-MCNC: 96 MG/DL (ref 70–99)
HCT VFR BLD AUTO: 33.3 % (ref 35–47)
HGB BLD-MCNC: 10.4 G/DL (ref 11.7–15.7)
MAGNESIUM SERPL-MCNC: 1.9 MG/DL (ref 1.6–2.3)
MCH RBC QN AUTO: 28.4 PG (ref 26.5–33)
MCHC RBC AUTO-ENTMCNC: 31.2 G/DL (ref 31.5–36.5)
MCV RBC AUTO: 91 FL (ref 78–100)
PHOSPHATE SERPL-MCNC: 2.7 MG/DL (ref 2.5–4.5)
PLATELET # BLD AUTO: 259 10E9/L (ref 150–450)
POTASSIUM SERPL-SCNC: 3.6 MMOL/L (ref 3.4–5.3)
PROT SERPL-MCNC: 6.5 G/DL (ref 6.8–8.8)
RBC # BLD AUTO: 3.66 10E12/L (ref 3.8–5.2)
SODIUM SERPL-SCNC: 139 MMOL/L (ref 133–144)
WBC # BLD AUTO: 9.4 10E9/L (ref 4–11)

## 2019-03-15 PROCEDURE — 36415 COLL VENOUS BLD VENIPUNCTURE: CPT | Performed by: SURGERY

## 2019-03-15 PROCEDURE — T1013 SIGN LANG/ORAL INTERPRETER: HCPCS | Mod: U3

## 2019-03-15 PROCEDURE — 27110038 ZZH RX 271: Performed by: ANESTHESIOLOGY

## 2019-03-15 PROCEDURE — 12000001 ZZH R&B MED SURG/OB UMMC

## 2019-03-15 PROCEDURE — 84100 ASSAY OF PHOSPHORUS: CPT | Performed by: SURGERY

## 2019-03-15 PROCEDURE — 97535 SELF CARE MNGMENT TRAINING: CPT | Mod: GO

## 2019-03-15 PROCEDURE — 25000125 ZZHC RX 250: Performed by: ANESTHESIOLOGY

## 2019-03-15 PROCEDURE — 25000128 H RX IP 250 OP 636: Performed by: SURGERY

## 2019-03-15 PROCEDURE — 25000128 H RX IP 250 OP 636: Performed by: STUDENT IN AN ORGANIZED HEALTH CARE EDUCATION/TRAINING PROGRAM

## 2019-03-15 PROCEDURE — 83735 ASSAY OF MAGNESIUM: CPT | Performed by: SURGERY

## 2019-03-15 PROCEDURE — 85027 COMPLETE CBC AUTOMATED: CPT | Performed by: SURGERY

## 2019-03-15 PROCEDURE — 25000132 ZZH RX MED GY IP 250 OP 250 PS 637: Performed by: SURGERY

## 2019-03-15 PROCEDURE — 80053 COMPREHEN METABOLIC PANEL: CPT | Performed by: SURGERY

## 2019-03-15 RX ORDER — AMOXICILLIN 250 MG
1-2 CAPSULE ORAL 2 TIMES DAILY
Status: DISCONTINUED | OUTPATIENT
Start: 2019-03-15 | End: 2019-03-17 | Stop reason: HOSPADM

## 2019-03-15 RX ORDER — HYDROMORPHONE HYDROCHLORIDE 1 MG/ML
.3-.5 INJECTION, SOLUTION INTRAMUSCULAR; INTRAVENOUS; SUBCUTANEOUS EVERY 4 HOURS PRN
Status: DISCONTINUED | OUTPATIENT
Start: 2019-03-15 | End: 2019-03-17 | Stop reason: HOSPADM

## 2019-03-15 RX ORDER — OXYCODONE HYDROCHLORIDE 5 MG/1
5-10 TABLET ORAL EVERY 4 HOURS PRN
Status: DISCONTINUED | OUTPATIENT
Start: 2019-03-15 | End: 2019-03-17 | Stop reason: HOSPADM

## 2019-03-15 RX ADMIN — Medication 10 MEQ: at 02:34

## 2019-03-15 RX ADMIN — ACETAMINOPHEN 975 MG: 325 TABLET, FILM COATED ORAL at 08:46

## 2019-03-15 RX ADMIN — Medication 10 MEQ: at 00:12

## 2019-03-15 RX ADMIN — SENNOSIDES AND DOCUSATE SODIUM 2 TABLET: 8.6; 5 TABLET ORAL at 21:35

## 2019-03-15 RX ADMIN — Medication: at 14:31

## 2019-03-15 RX ADMIN — Medication 10 MEQ: at 01:22

## 2019-03-15 RX ADMIN — OXYCODONE HYDROCHLORIDE 5 MG: 5 TABLET ORAL at 17:17

## 2019-03-15 RX ADMIN — ACETAMINOPHEN 975 MG: 325 TABLET, FILM COATED ORAL at 12:00

## 2019-03-15 RX ADMIN — ACETAMINOPHEN 975 MG: 325 TABLET, FILM COATED ORAL at 17:17

## 2019-03-15 RX ADMIN — ENOXAPARIN SODIUM 40 MG: 40 INJECTION SUBCUTANEOUS at 12:01

## 2019-03-15 RX ADMIN — OXYCODONE HYDROCHLORIDE 5 MG: 5 TABLET ORAL at 21:35

## 2019-03-15 RX ADMIN — OXYCODONE HYDROCHLORIDE 5 MG: 5 TABLET ORAL at 11:09

## 2019-03-15 RX ADMIN — SENNOSIDES AND DOCUSATE SODIUM 2 TABLET: 8.6; 5 TABLET ORAL at 12:01

## 2019-03-15 ASSESSMENT — ACTIVITIES OF DAILY LIVING (ADL)
ADLS_ACUITY_SCORE: 14

## 2019-03-15 ASSESSMENT — PAIN DESCRIPTION - DESCRIPTORS
DESCRIPTORS: ACHING
DESCRIPTORS: SORE
DESCRIPTORS: ACHING
DESCRIPTORS: ACHING

## 2019-03-15 ASSESSMENT — MIFFLIN-ST. JEOR: SCORE: 1454.71

## 2019-03-15 NOTE — PROGRESS NOTES
REGIONAL ANESTHESIA PAIN SERVICE EVALUATION:  - Time: paged at 0243(returned call 0243; bedside 0249).  Called to evaluate patient for increased pain overnight      - Evaluation:    Current vitals: /85, P 91    - Assessment/Plan  ## PAIN:   - MEDICATION: PF bupivacaine .25%, total bolus 10 mL, 5 mL via each catheter  - PROCEDURE: Clinician bolus via PV nerve block catheter; administered without complication with negative aspirate before and between each mL.    No symptoms of local anesthetic systemic toxicity (LAST). Remained with and assessed patient for 5 min post-injection. BP, P and MAP stable  - POST-PROCEDURE: Bedside RN aware of need to continue BP, P and MAP monitoring Q 15 min for an additional 30 min. Contact RAPS  if any of the following: patient experiencing any untoward effects, SBP< 90, P < 50 or > 120, MAP < 60    - patient can be evaluated to receive local anesthetic bolus Q 12 hr PRN pain not controlled with continuous infusion.  Bedside nurse must page RAPS to request bolus    Kalina Benito MD    RAPS Contact Info (24 hour job code pager is the last 4 digits) For in-house use only:  [employer] phone: [place] [phone], [place] [employer] [phone], [facility] [phone], then enter call-back number.    Text: Use [employer] on the Intranet <Paging/Directory> tab and enter Jobcode ID.   If no call back at any time, contact the hospital  and ask for RAPS attending or backup

## 2019-03-15 NOTE — PROGRESS NOTES
REGIONAL ANESTHESIA PAIN SERVICE CONTINUOUS NERVE INFUSION NOTE  Bryanna Taylor is a 38 year old female POD #2 s/p SOL EN Y BOWEL and placement of bilateral T8-9 erector spinae (ES) catheters for pain control.    SUBJECTIVE:  Interval History: Overnight events:  Last bolus 3/15/19 0243.  Patient reports adequate pain control with nerve block continuous infusion and current analgesics (see below).  Denies weakness, paresthesias, circumoral numbness, metallic taste or tinnitus. Patient is ambulating with assistance.  Currently tolerating a CL diet, denies nausea or vomiting.     Clinically Aligned Pain Assessment (CAPA):   Comfort (How is your pain?): Comfortably manageable  Change in Pain (Since your last medication/intervention?): Getting better  Pain Control (How are your pain treatments working?):  Partially effective pain control  Functioning (Are you able to do activities to get better?) : Can do most things, but pain gets in the way of some   Sleep (Does your pain management allow you to sleep or rest?): Awake with occasional pain    Antithrombotic/Thrombolytic Therapy ordered:  Lovenox 40mg subcutaneous q 24hrs    Analgesic Medications:   Medications related to Pain Management (From now, onward)    Start     Dose/Rate Route Frequency Ordered Stop    03/14/19 0800  acetaminophen (TYLENOL) tablet 975 mg     Comments:  Do not exceed total acetaminophen dose of 4 grams per 24 hours from all sources.    975 mg Oral 3 TIMES DAILY. 03/14/19 0746      03/13/19 1800  lidocaine 1 % 0.1-1 mL      0.1-1 mL Other EVERY 1 HOUR PRN 03/13/19 1800      03/13/19 1800  lidocaine (LMX4) cream       Topical EVERY 1 HOUR PRN 03/13/19 1800      03/13/19 1530  HYDROmorphone (DILAUDID) PCA 1 mg/mL OPIOID NAIVE       Intravenous CONTINUOUS 03/13/19 1522      03/13/19 0945  ROPivacaine 0.2% (NAROPIN) 750 mL in ON-Q C-Bloc select flow ([id number] holds 600-750 mL) dual cath disposable pump      7 mL/hr  Irrigation Continuous Nerve  Block 03/13/19 0941             OBJECTIVE:  Lab results  Recent Labs   Lab Test 03/14/19  0745   WBC 11.9*   RBC 3.93   HGB 11.1*   HCT 34.4*   MCV 88   MCH 28.2   MCHC 32.3   RDW 13.6          Lab Results   Component Value Date    INR 1.11 03/07/2019    INR 1.09 02/03/2019    INR 0.94 01/14/2019         Vitals:    Temp:  [97.4  F (36.3  C)-98.6  F (37  C)] 98.5  F (36.9  C)  Pulse:  [94] 94  Heart Rate:  [80-98] 80  Resp:  [15-26] 15  BP: (106-139)/(63-85) 139/79  SpO2:  [93 %-98 %] 93 %    Exam:   GEN: alert and no distress. Answers questions appropriately though use of   NEURO/MSK: Strength B/L LE 5/5  and overall symmetric  SKIN: bilateral erector spinae (ES) catheter site with dressing c/d/i, no tenderness, erythema, heme, edema      ASSESSMENT/PLAN:    Patient is receiving adequate analgesia with current multimodal therapy including bilateral T8-9 erector spinae (ES) catheters with infusion of Ropivacaine 0.2% at 14mL/hr, 7mL/hr each catheter.  Pt is participating in therapies.  No evidence of adverse side effects related to local anesthetic.     - continue Ropivacaine 0.2% infusion rate at 14mL/hr, 7mL/hr each catheter, POD #2  - expected change of next On-Q pump is today - RN aware  - antithrombotic/thrombolytic therapy: ok to continue lovenox 40mg subcutaneous q 24hrs as ordered. Please contact RAPS (#[phone]) prior to any medication changes  - will continue to follow and adjust as needed    - discussed plan with attending anesthesiologist    PERLA Sherman CNP  Regional Anesthesia Pain Service  3/15/2019 6:46 AM    RAPS Contact Info (24 hour job code pager is the last 4 digits) For in-house use only:   [employer] phone: [place] [phone], [place] [phone], [facility] [phone], then enter call-back number.    Text: Use [employer] on the Intranet <Paging/Directory> tab and enter Jobcode ID.   If no call back at any time, contact the hospital  and ask for RAPS attending or backup

## 2019-03-15 NOTE — PLAN OF CARE
7C PT: Attempted to see pt for scheduled  therapy time. Pt declining as she has just gotten out of bed and would like to rest. Pt educated on frequency OOB activity throughout the rest of the day as writer is unable to check back later d/t scheduling. Will reschedule.

## 2019-03-15 NOTE — PROGRESS NOTES
REGIONAL ANESTHESIA PAIN SERVICE CONTINUOUS NERVE INFUSION NOTE  Bryanna Taylor is a 38 year old female POD #2 s/p SOL EN Y BOWEL and placement of bilateral T8-9 erector spinae (ES) catheters for pain control.     SUBJECTIVE:  Interval History: Overnight events:  Last bolus 3/15/19 0243.  Patient reports adequate pain control with nerve block continuous infusion and current analgesics (see below).  Denies weakness, paresthesias, circumoral numbness, metallic taste or tinnitus. Patient is ambulating with assistance.  Currently tolerating a CL diet, denies nausea or vomiting.                 Clinically Aligned Pain Assessment (CAPA):   Comfort (How is your pain?): Comfortably manageable  Change in Pain (Since your last medication/intervention?): Getting better  Pain Control (How are your pain treatments working?):  Partially effective pain control  Functioning (Are you able to do activities to get better?) : Can do most things, but pain gets in the way of some   Sleep (Does your pain management allow you to sleep or rest?): Awake with occasional pain     Antithrombotic/Thrombolytic Therapy ordered:  Lovenox 40mg subcutaneous q 24hrs     Analgesic Medications:               Medications related to Pain Management (From now, onward)     Start     Dose/Rate Route Frequency Ordered Stop     03/14/19 0800   acetaminophen (TYLENOL) tablet 975 mg     Comments:  Do not exceed total acetaminophen dose of 4 grams per 24 hours from all sources.    975 mg Oral 3 TIMES DAILY. 03/14/19 0746       03/13/19 1800   lidocaine 1 % 0.1-1 mL      0.1-1 mL Other EVERY 1 HOUR PRN 03/13/19 1800       03/13/19 1800   lidocaine (LMX4) cream        Topical EVERY 1 HOUR PRN 03/13/19 1800       03/13/19 1530   HYDROmorphone (DILAUDID) PCA 1 mg/mL OPIOID NAIVE        Intravenous CONTINUOUS 03/13/19 1522       03/13/19 0945   ROPivacaine 0.2% (NAROPIN) 750 mL in ON-Q C-Bloc select flow ([id number] holds 600-750 mL) dual cath disposable pump      7  mL/hr  Irrigation Continuous Nerve Block 03/13/19 0941               OBJECTIVE:  Lab results      Recent Labs   Lab Test 03/14/19  0745   WBC 11.9*   RBC 3.93   HGB 11.1*   HCT 34.4*   MCV 88   MCH 28.2   MCHC 32.3   RDW 13.6                  Lab Results   Component Value Date     INR 1.11 03/07/2019     INR 1.09 02/03/2019     INR 0.94 01/14/2019            Vitals:    Temp:  [97.4  F (36.3  C)-98.6  F (37  C)] 98.5  F (36.9  C)  Pulse:  [94] 94  Heart Rate:  [80-98] 80  Resp:  [15-26] 15  BP: (106-139)/(63-85) 139/79  SpO2:  [93 %-98 %] 93 %     Exam:   GEN: alert and no distress. Answers questions appropriately though use of   NEURO/MSK: Strength B/L LE 5/5  and overall symmetric  SKIN: bilateral erector spinae (ES) catheter site with dressing c/d/i, no tenderness, erythema, heme, edema        ASSESSMENT/PLAN:    Patient is receiving adequate analgesia with current multimodal therapy including bilateral T8-9 erector spinae (ES) catheters with infusion of Ropivacaine 0.2% at 14mL/hr, 7mL/hr each catheter.  Pt is participating in therapies.  No evidence of adverse side effects related to local anesthetic.      - continue Ropivacaine 0.2% infusion rate at 14mL/hr, 7mL/hr each catheter, POD #2  - expected change of next On-Q pump is today - RN aware  - antithrombotic/thrombolytic therapy: ok to continue lovenox 40mg subcutaneous q 24hrs as ordered. Please contact RAPS (#[phone]) prior to any medication changes  - will continue to follow and adjust as needed     Nestor Valencia   Regional Anesthesia Pain Service  3/15/2019 1:09 PM     RAPS Contact Info (24 hour job code pager is the last 4 digits) For in-house use only:   [employer] phone: [place] [phone], [place] [phone], P[facility] [phone], then enter call-back number.    Text: Use [employer] on the Intranet <Paging/Directory> tab and enter Jobcode ID.   If no call back at any time, contact the hospital  and ask for RAPS attending or backup

## 2019-03-15 NOTE — PLAN OF CARE
Pt is Hungarian speaking and a  used during interactions. A & O. AVSS. Denies n/v. Abdominal incision open to air. Pt managing pain with PCA pump and ON-Q. Dressings clean, dry, intact. Mg and K replaced this evening. Mg labs looked ok. K was 3.2 and protocol was restarted. Will need 3 more bags and a recheck tonight. Longo with adequate urine output after being flushed this evening. LOREN bulb with serosanguinous drainage. Bili drain flushed with 10 ml of NS. PIV on right hand removed this afternoon due to irritation. Vascular access replaced PIV on left hand. Capno discontinued this evening. Clear liquid diet. Up w/ assist X 1. Calls appropriately. Continue POC.      information:     Name: Eboni Leong  ID #: ?  Agency: [employer]  Purpose: Interpret Romansh during RN cares

## 2019-03-15 NOTE — PLAN OF CARE
Pt a/o x 4 overnight. VSS. PCA dilaudid infusing 0.2 q 10min prn. Pt stated increase in pain and anesthesia came to bolus on Q x 1 overnight with relief. On Q infusing at 7cc/hr x 2. Bili drain clamped. LOREN scant output. Longo adequate urine output. Has her menses. O2 dropped to 89% while sleeping this am. 2L applied. Refused PCD's. Makes needs known.

## 2019-03-15 NOTE — PLAN OF CARE
Pt Latvian speaking. Pt VSS on room air. PCA discontinued this shift. Pt pain now managed by OnQ pump at 14cc and oxycodone. Pt moved to regular diet. Tolerating well. Denies passing gas. Longo removed this shift and pt voided once since then at 125 mL. LOREN with small output. Biliary drain clamped and flushed with 10cc at beginning of shift. Up with assist of 1 and walker.

## 2019-03-15 NOTE — PROGRESS NOTES
SURGICAL ONCOLOGY PROGRESS NOTE  03/15/2019   POD#2     Subjective/Interval Events  NAEON. Feeling well, no complaints. Pain controlled. Tolerating CLD without n/v. Passing flatus, no BM. Ambulated 2x yesterday. Using IS.      Objective  Temp:  [97.4  F (36.3  C)-98.6  F (37  C)] 98.5  F (36.9  C)  Pulse:  [94] 94  Heart Rate:  [80-98] 80  Resp:  [15-26] 15  BP: (106-139)/(63-85) 139/79  SpO2:  [93 %-98 %] 93 %    General: AAOx4, NAD, laying comfortably in bed  CV: warm, well perfused  Pulm: nonlabored breathing on RA  Abdomen: soft, non-distended, appropriately tender, no rebound or guarding; incision c/d/i; LOREN with serosanguinous output, PTC capped  : Longo draining clear yellow urine  Extremities: no edema, moving all spontaneously and without apparent deficit    I/O last 3 completed shifts:  In: 2642.25 [P.O.:740; I.V.:1902.25]  Out: 3100 [Urine:2835; Drains:265]    Labs:  Recent Labs   Lab 03/14/19  0745 03/14/19  0002   WBC 11.9*  --    HGB 11.1*  --     312     Recent Labs   Lab 03/14/19 2121 03/14/19  0745 03/14/19  0002   NA  --  140  --    POTASSIUM 3.2* 3.2*  --    CHLORIDE  --  108  --    CO2  --  24  --    BUN  --  10  --    CR  --  0.66 0.58   GLC  --  109*  --    AZEB  --  8.0*  --    MAG 2.3 1.5*  --    PHOS  --  4.0  --      Recent Labs   Lab 03/14/19  0745   AST 57*   *   ALKPHOS 178*   BILITOTAL 1.6*   ALBUMIN 2.6*          Assessment/Plan  38 year old woman with a h/o bile duct transection during a prior lap sheryl 1/2019 s/p prior PTC tube now s/p Leila-en-Y hepaticojejunostomy on 3/13/2019 with Dr. Morillo.    Neuro:   - Tylenol 975 TID  - discontinue PCA, start oxycodone with dilaudid IV for breakthrough    CV: HD stable, no acute issues    Resp: satting on RA, encourage IS    GI/FEN:    - advance to regular diet  - discontinue MIVFs  - start senna-colace 1-2 tab BID  - replete lytes prn, K per protocol  - PTC capped, flush BID with 10mL  - LOREN to bulb suction    : UOP adequate,  discontinue cordero, monitor for void    ID: no issues    Heme: Hgb stable    Endo: no issues     Ppx: Lovenox, OOB, IS  Activity: ambulate QID, PT/OT  Dispo: floor    Disposition Plan   Expected discharge in 1-2 days to prior living arrangement pending clinical course.  Discharge needs:   - Will not need Lovenox injections on discharge.  - Unclear if will discharge with LOREN and PTC (teaching ordered); will continue to assess.     Entered: Lor Marroquin 03/15/2019, 6:31 AM         Patient and plan discussed with chief resident Dr. Diez, who will discuss with staff Dr. Morillo.  - - - - - - - - - - - - - - - - - -  Lor Marroquin MD  General Surgery PGY-3  See [facility] for on call information prior to paging me directly. Pager [phone].

## 2019-03-15 NOTE — PLAN OF CARE
OT7C:  Discharge Planner OT   Patient plan for discharge: home with A  Current status: Pt supine<>sit EOB Deneen HOB elevated. Seated EOB pt educated on use of figure 4 technique to don/doff bilateral socks able to complete Deneen. Demonstrated use of sock aide and reacher to progress LBD IND. Pt sit<>stand CGA +Fww ambulated to bathroom SBA+Fww. Completed toilet transfer CGA and franklin cares CGA. Supine in bed upon TH exit with all needs met maxAx2 to boost higher towards HOB. VSS on RA.   Barriers to return to prior living situation: medical status, decreased strength and endurance, pain, precautions, below baseline for functional mobility and IND with I/ADLs   Recommendations for discharge: home with A  Rationale for recommendations: Anticipate with continued therapy pt will progress to return home with assistance as needed for heavy I/ADLs        Entered by: Osiris Agee 03/15/2019 3:01 PM

## 2019-03-16 ENCOUNTER — OFFICE VISIT (OUTPATIENT)
Dept: INTERPRETER SERVICES | Facility: CLINIC | Age: 39
End: 2019-03-16
Payer: MEDICAID

## 2019-03-16 ENCOUNTER — APPOINTMENT (OUTPATIENT)
Dept: OCCUPATIONAL THERAPY | Facility: CLINIC | Age: 39
End: 2019-03-16
Attending: SURGERY
Payer: MEDICAID

## 2019-03-16 ENCOUNTER — APPOINTMENT (OUTPATIENT)
Dept: PHYSICAL THERAPY | Facility: CLINIC | Age: 39
End: 2019-03-16
Attending: SURGERY
Payer: MEDICAID

## 2019-03-16 LAB
ALBUMIN SERPL-MCNC: 2.6 G/DL (ref 3.4–5)
ALP SERPL-CCNC: 206 U/L (ref 40–150)
ALT SERPL W P-5'-P-CCNC: 93 U/L (ref 0–50)
ANION GAP SERPL CALCULATED.3IONS-SCNC: 11 MMOL/L (ref 3–14)
AST SERPL W P-5'-P-CCNC: 46 U/L (ref 0–45)
BILIRUB SERPL-MCNC: 0.6 MG/DL (ref 0.2–1.3)
BUN SERPL-MCNC: 7 MG/DL (ref 7–30)
CALCIUM SERPL-MCNC: 8.3 MG/DL (ref 8.5–10.1)
CHLORIDE SERPL-SCNC: 101 MMOL/L (ref 94–109)
CO2 SERPL-SCNC: 27 MMOL/L (ref 20–32)
CREAT SERPL-MCNC: 0.6 MG/DL (ref 0.52–1.04)
ERYTHROCYTE [DISTWIDTH] IN BLOOD BY AUTOMATED COUNT: 13.3 % (ref 10–15)
GFR SERPL CREATININE-BSD FRML MDRD: >90 ML/MIN/{1.73_M2}
GLUCOSE SERPL-MCNC: 90 MG/DL (ref 70–99)
HCT VFR BLD AUTO: 34.3 % (ref 35–47)
HGB BLD-MCNC: 10.7 G/DL (ref 11.7–15.7)
MAGNESIUM SERPL-MCNC: 1.8 MG/DL (ref 1.6–2.3)
MCH RBC QN AUTO: 27.8 PG (ref 26.5–33)
MCHC RBC AUTO-ENTMCNC: 31.2 G/DL (ref 31.5–36.5)
MCV RBC AUTO: 89 FL (ref 78–100)
PHOSPHATE SERPL-MCNC: 4 MG/DL (ref 2.5–4.5)
PLATELET # BLD AUTO: 276 10E9/L (ref 150–450)
POTASSIUM SERPL-SCNC: 3.2 MMOL/L (ref 3.4–5.3)
PROT SERPL-MCNC: 6.7 G/DL (ref 6.8–8.8)
RBC # BLD AUTO: 3.85 10E12/L (ref 3.8–5.2)
SODIUM SERPL-SCNC: 139 MMOL/L (ref 133–144)
WBC # BLD AUTO: 7.5 10E9/L (ref 4–11)

## 2019-03-16 PROCEDURE — 84100 ASSAY OF PHOSPHORUS: CPT | Performed by: SURGERY

## 2019-03-16 PROCEDURE — 25000125 ZZHC RX 250: Performed by: ANESTHESIOLOGY

## 2019-03-16 PROCEDURE — 36415 COLL VENOUS BLD VENIPUNCTURE: CPT | Performed by: SURGERY

## 2019-03-16 PROCEDURE — 85027 COMPLETE CBC AUTOMATED: CPT | Performed by: SURGERY

## 2019-03-16 PROCEDURE — 97530 THERAPEUTIC ACTIVITIES: CPT | Mod: GP

## 2019-03-16 PROCEDURE — 25000132 ZZH RX MED GY IP 250 OP 250 PS 637: Performed by: STUDENT IN AN ORGANIZED HEALTH CARE EDUCATION/TRAINING PROGRAM

## 2019-03-16 PROCEDURE — 25000132 ZZH RX MED GY IP 250 OP 250 PS 637: Performed by: SURGERY

## 2019-03-16 PROCEDURE — 83735 ASSAY OF MAGNESIUM: CPT | Performed by: SURGERY

## 2019-03-16 PROCEDURE — 80053 COMPREHEN METABOLIC PANEL: CPT | Performed by: SURGERY

## 2019-03-16 PROCEDURE — 97535 SELF CARE MNGMENT TRAINING: CPT | Mod: GO | Performed by: OCCUPATIONAL THERAPIST

## 2019-03-16 PROCEDURE — T1013 SIGN LANG/ORAL INTERPRETER: HCPCS | Mod: U3

## 2019-03-16 PROCEDURE — 27110038 ZZH RX 271: Performed by: ANESTHESIOLOGY

## 2019-03-16 PROCEDURE — 12000001 ZZH R&B MED SURG/OB UMMC

## 2019-03-16 PROCEDURE — 25000128 H RX IP 250 OP 636: Performed by: SURGERY

## 2019-03-16 PROCEDURE — 97116 GAIT TRAINING THERAPY: CPT | Mod: GP

## 2019-03-16 RX ORDER — OXYCODONE HYDROCHLORIDE 5 MG/1
5-10 TABLET ORAL EVERY 4 HOURS PRN
Qty: 25 TABLET | Refills: 0 | Status: SHIPPED | OUTPATIENT
Start: 2019-03-16

## 2019-03-16 RX ORDER — AMOXICILLIN 250 MG
1-2 CAPSULE ORAL 2 TIMES DAILY
Qty: 60 TABLET | Refills: 1 | Status: SHIPPED | OUTPATIENT
Start: 2019-03-16

## 2019-03-16 RX ORDER — POLYETHYLENE GLYCOL 3350 17 G/17G
1 POWDER, FOR SOLUTION ORAL DAILY
Qty: 510 G | Refills: 1 | Status: SHIPPED | OUTPATIENT
Start: 2019-03-16

## 2019-03-16 RX ORDER — ACETAMINOPHEN 325 MG/1
975 TABLET ORAL 3 TIMES DAILY
Qty: 100 TABLET | Refills: 1 | Status: SHIPPED | OUTPATIENT
Start: 2019-03-16

## 2019-03-16 RX ADMIN — OXYCODONE HYDROCHLORIDE 5 MG: 5 TABLET ORAL at 20:45

## 2019-03-16 RX ADMIN — POTASSIUM CHLORIDE 40 MEQ: 1.5 POWDER, FOR SOLUTION ORAL at 21:55

## 2019-03-16 RX ADMIN — OXYCODONE HYDROCHLORIDE 5 MG: 5 TABLET ORAL at 01:22

## 2019-03-16 RX ADMIN — OXYCODONE HYDROCHLORIDE 5 MG: 5 TABLET ORAL at 14:50

## 2019-03-16 RX ADMIN — ENOXAPARIN SODIUM 40 MG: 40 INJECTION SUBCUTANEOUS at 12:53

## 2019-03-16 RX ADMIN — ACETAMINOPHEN 975 MG: 325 TABLET, FILM COATED ORAL at 09:04

## 2019-03-16 RX ADMIN — SENNOSIDES AND DOCUSATE SODIUM 1 TABLET: 8.6; 5 TABLET ORAL at 09:05

## 2019-03-16 RX ADMIN — OXYCODONE HYDROCHLORIDE 5 MG: 5 TABLET ORAL at 05:56

## 2019-03-16 RX ADMIN — ACETAMINOPHEN 975 MG: 325 TABLET, FILM COATED ORAL at 12:52

## 2019-03-16 RX ADMIN — SENNOSIDES AND DOCUSATE SODIUM 2 TABLET: 8.6; 5 TABLET ORAL at 20:39

## 2019-03-16 RX ADMIN — Medication: at 01:23

## 2019-03-16 RX ADMIN — ACETAMINOPHEN 975 MG: 325 TABLET, FILM COATED ORAL at 18:37

## 2019-03-16 ASSESSMENT — ACTIVITIES OF DAILY LIVING (ADL)
ADLS_ACUITY_SCORE: 14
ADLS_ACUITY_SCORE: 12
ADLS_ACUITY_SCORE: 14
ADLS_ACUITY_SCORE: 12
ADLS_ACUITY_SCORE: 14
ADLS_ACUITY_SCORE: 14

## 2019-03-16 ASSESSMENT — PAIN DESCRIPTION - DESCRIPTORS
DESCRIPTORS: ACHING

## 2019-03-16 NOTE — PLAN OF CARE
AVSS on room air. Pain managed with Oxycodone and On-Q. On a regular diet, denies nausea. LOREN with scant serosanguinous output, stripped per order. Bili drain clamped. Abdominal incision dermabond. Up with assist of 1 and walker. Voiding spontaneously. Passing gas, no bowel movement yet. Fijian speaking. Continue with plan of care - awaiting full return of bowel function.

## 2019-03-16 NOTE — PLAN OF CARE
Discharge Planner PT   Patient plan for discharge: home with assist  Current status: pt needs SBA bed mobility to perform within precautions and verbal cues, stands IND to no AD, ambualtes x300' no AD with supervision and cues to improve stability but overall slow and steady. Discussed implications for 3 month old with abd precautions.  Barriers to return to prior living situation: medical needs  Recommendations for discharge: home with assist  Rationale for recommendations: pt would benefit from assist at home with higher level tasks 2/2 to abd precautions.       Entered by: Laquita Castellano 03/16/2019 2:04 PM

## 2019-03-16 NOTE — PLAN OF CARE
Indonesian speaking, interpretor present. VSS on RA. On-Q removed.  Pain managed with oxycodone and Tylenol. Tolerating regular diet.  LOREN removed.  Bili drain clamped and flushed per orders.  Drain care education given.  Abdominal incision open to air.  Showered today.  SBA with walker.  Voiding spontaneously, on menses. +gas, no BM.  Likely discharge today or tomorrow.

## 2019-03-16 NOTE — PROGRESS NOTES
REGIONAL ANESTHESIA PAIN SERVICE CONTINUOUS NERVE INFUSION NOTE  Subjective and Interval History: Pt reports moderate pain control via nerve block continuous infusion and PO analgesics.  Denies any weakness, paresthesias, circumoral numbness, metallic taste or tinnitus.  Pt is ambulating with assistance.  Patient currently denies nausea or vomiting.     Clinically Aligned Pain Assessment (CAPA):   Comfort (How is your pain?): Reasonably manageable  Change in Pain (Since your last medication/intervention?): About the same  Pain Control (How are your pain treatments working?):  Partially effective pain control  Functioning (Are you able to do activities to get better?) : Can do most things, but pain gets in the way of some        OBJECTIVE:   Blood pressure 131/76, pulse 87, temperature 35.7  C (96.2  F), resp. rate 16, height 1.524 m (5'), weight 85.8 kg (189 lb 3.2 oz), last menstrual period 02/21/2019, SpO2 94 %, not currently breastfeeding.      Exam:   This exam was performed with the assistance of a .  Pt is pleasant and conversational, in no acute distress, AOx3.  Strength 5/5 and symmetric grossly in bilateral LE.    Sensorium in tact to light touch in bilateral LE.  Insertion sites c/d/i, no tenderness, erythema, heme, edema.     ASSESSMENT:     Bryanna Taylor is a 38 year old female POD#3 s/p Leila en Y bowel bypass and placement of b/l T8-9 erector spinae catheters for analgesia.  Pt is receiving adequate mutli-modal analgesia with PO meds and On Q pump.  Pt is ambulating without difficulty and denies weakness nor paresthesias, but still with an adequate sensory block.  No evidence of adverse side effects associated with local anesthetic.      PLAN:  - Decrease right-sided ES catheter infusion rate from 7ml/hr to 5mL/hr.   - pt can receive local anesthetic bolus Q 12 hr PRN, bedside nurse must contact [employer] weekscode pager [phone]  - Anticoagulation: Lovenox 40mg q24, please contact [employer] if  dose or medication is changed  - plan catheter removal pending discharge planning per primary team.  - will continue to follow and adjust as needed  - discussed plan with attending anesthesiologist     Jose Lorenz DO, MSc  CA-2/PGY-3 Anesthesiology  [phone]     24 hour Job Code Pager.  For in-house use only.     [place]:  * * *[phone]  [facility]: * * *[phone]  Peds: * * [phone]  Enter call-back number and #      This pager only accepts text messages through [facility]

## 2019-03-16 NOTE — OP NOTE
Procedure Date: 03/13/2019      SURGEON:  Michael Marks MD      FIRST ASSISTANT:  Thomas Diez, PGY5      PREOPERATIVE DIAGNOSIS: Injury to the common bile duct.      POSTOPERATIVE DIAGNOSIS:  Common hepatic duct injury at the level of the hilum.      PROCEDURES:   1.  Exploratory laparotomy.   2.  Extensive lysis of adhesions.   3.  Intraoperative ultrasound.   4.  Intraoperative cholangiogram.   5.  Open Leila-en-Y biliary reconstruction to the hilum with hepaticojejunostomy.      ESTIMATED BLOOD LOSS:  30 mL.      SPECIMENS:  None.      COMPLICATIONS:  None.      INDICATIONS:  Ms. Taylor is a 38-year-old female who had a laparoscopic cholecystectomy, which was complicated by an injury to the common bile duct leading to a completely disconnected duct.  She was temporized with external drainage and is taken to the operating room today for permanent reconstruction.      DESCRIPTION OF PROCEDURE:  The patient was put to sleep by Anesthesia, prepped and draped sterilely.  Pause for the cause was performed and was accurate.  We entered the abdomen through an upper midline incision and placed a self-retaining retractor.  We spent over an hour taking down adhesions due to her previous surgery which were quite difficult.  Ultimately, after approximately 90 minutes, we were able to get to the alejandro and begin identifying critical structures.  We were able to identify the hepatic artery at the neck of the pancreas and follow it distally beyond the branches of the right and left artery.  The distal biliary stump had fibrosed inferiorly and essentially was inseparable from the posterior duodenum/head of the pancreas.  We left this area undisturbed.  In dissecting the hepatic arterial system, we never came across a proximal remnant of bile duct which was indicative of a very high injury.  After a couple hours of dissection, we ultimately identified what appeared to be the distal stump of the hepatic duct.  We used  intraoperative ultrasound at times during the case to guide our dissection, we also performed an intraoperative ultrasound of the liver to identify the traversing, PTC catheter to help us identify the area of the hilum.  Ultimately, we did identify the area of injury which was essentially at the confluence of the right and left hepatic ducts.  It was above the bifurcation of the hepatic arterial system.  We also, in our dissection of the hilum, took down the hilar plate and also identified the right and left branches of the portal vein which were freed up from the confluence of the bile duct to allow for future reconstruction.  Ultimately, we used an 11 blade scalpel after placing stay stitches in the right and left hepatic ducts.  We used an 11 blade to open the confluence with good return of bile.  We were also able to visualize the PTC catheter within.  At this point, we created a Leila limb about 40 cm distal to ligament of Treitz.  We divided the bowel using blue load stapling device and then divided the marginal artery between silk ligatures.  We brought up our Leila limb in a retromesenteric fashion after making a small defect in the mesentery to the right of the middle colic vessels.  We created a side-to-side jejunojejunostomy using a blue load stapling device and closed the common enterotomy using a blue TA 60 stapler.  We then created an end-to-side hepaticojejunostomy at the level of the hilum using interrupted 4-0 PDS sutures.  The anastomosis size was approximately 1 cm.  Following completion of our anastomosis which was done under no tension, we performed an intraoperative cholangiogram to confirm adequate drainage of the liver without any obstruction and also to rule out leaks and none were apparent.  With that, we irrigated the abdomen thoroughly.  We placed a 15 fluted drain across our anastomosis and tacked it in place using 3-0 nylon suture.  We then closed the abdominal fascia using 0 looped PDS.   Subcutaneous tissues were irrigated thoroughly and the skin was closed in 2 layers using 3-0 Vicryl followed by 4-0 Monocryl followed by Dermabond.  The patient tolerated the procedure well and was extubated in the operating room and transferred to recovery room in stable condition.  I was present throughout the entire procedure as described.         HUYEN HUI MD             D: 2019   T: 2019   MT: AS      Name:     EDGAR SIMON   MRN:      [id number]        Account:        [id number]   :      1980           Procedure Date: 2019      Document: [id number]

## 2019-03-16 NOTE — PROGRESS NOTES
Anesthesia Erector Spinae Catheter Removal Note    Patient is POD3 s/p Leila-en-Y bypass. Currently has b/l T8-9 erector catheter inserted on 3/13 at level of T8-9, infusing Ropivicaine at 7mL/side. Found patient resting in bed, site clear, clean, intact, with moderate amount of subQ swelling on right side without callor or erythema. Catheters removed without resistance or force, cathter tips intact. Area covered with clear dressing, no bleeding, discharge or pain at site after removal.    -B/l ES catheters removed at 1000  -Anticoagulation plan: may resume ppx Lovenox as currently scheduled.    Jose Lorenz DO, MSc  Anesthesia Resident, CA-2/PGY-3

## 2019-03-16 NOTE — PROGRESS NOTES
SURGICAL ONCOLOGY PROGRESS NOTE  03/16/2019   POD#3     Subjective/Interval Events  NAEON. Feeling well, tolerating regular diet.  Ambulating.  Pain controlled.    Objective  Temp:  [96.2  F (35.7  C)-98.2  F (36.8  C)] 96.2  F (35.7  C)  Pulse:  [87] 87  Heart Rate:  [76-81] 81  Resp:  [16] 16  BP: (113-131)/(68-78) 131/76  SpO2:  [93 %-94 %] 94 %    General: AAOx4, NAD, laying comfortably in bed  CV: warm, well perfused  Pulm: nonlabored breathing on RA  Abdomen: soft, non-distended, appropriately tender, no rebound or guarding; incision c/d/i; LOREN with serosanguinous output, PTC capped  : Cordero draining clear yellow urine  Extremities: no edema, moving all spontaneously and without apparent deficit    I/O last 3 completed shifts:  In: 760 [P.O.:760]  Out: 2670 [Urine:2625; Drains:45]    Labs:  Recent Labs   Lab 03/16/19  0721 03/15/19  0654 03/14/19  0745   WBC 7.5 9.4 11.9*   HGB 10.7* 10.4* 11.1*    259 289     Recent Labs   Lab 03/16/19  0721 03/15/19  0654 03/14/19 2121 03/14/19  0745    139  --  140   POTASSIUM 3.2* 3.6 3.2* 3.2*   CHLORIDE 101 104  --  108   CO2 27 27  --  24   BUN 7 5*  --  10   CR 0.60 0.63  --  0.66   GLC 90 96  --  109*   AZEB 8.3* 8.2*  --  8.0*   MAG 1.8 1.9 2.3 1.5*   PHOS 4.0 2.7  --  4.0     Recent Labs   Lab 03/16/19  0721 03/15/19  0654 03/14/19  0745   AST 46* 44 57*   ALT 93* 95* 116*   ALKPHOS 206* 174* 178*   BILITOTAL 0.6 0.8 1.6*   ALBUMIN 2.6* 2.6* 2.6*          Assessment/Plan  38 year old woman with a h/o bile duct transection during a prior lap sheryl 1/2019 s/p prior PTC tube now s/p Leila-en-Y hepaticojejunostomy on 3/13/2019 with Dr. Morillo.    Neuro:   - Tylenol 975 TID, prn oxycodone  - Parvertebrals out today    CV: HD stable, no acute issues    Resp: satting on RA, encourage IS    GI/FEN:    - regular diet and bowel regimen  - replete lytes prn, K per protocol  - PTC capped, flush daily with 10mL  - LOREN removed this a.m.    : cordero out, voiding  well.    ID: no issues    Heme: Hgb stable    Endo: no issues     Ppx: Lovenox, OOB, IS  Activity: ambulate QID, PT/OT  Dispo: floor    Disposition Plan   Discharge today vs tomorrow morning pending PTC drain teaching.       D/w Dr. Ilia Diez MD  Surgery, PGY5  [phone]

## 2019-03-16 NOTE — PLAN OF CARE
Discharge Planner OT   Patient plan for discharge: home with assist from family  Current status: min A bed mobility sup>sit at EOB. STS from EOB SBA. Ambulated with FWW SBA >500 feet, no LOB. Tub Transfer SBA with use of grabbars, no LOB, pt reports having grabbars at home. Educated EC/WS/ fatigue management with shower task. Toilet transfer SBA with FWW.   Barriers to return to prior living situation: post surgical precautions  Recommendations for discharge: home with assist from family for heavy lifting.   Rationale for recommendations: Pt reports family is available to assist 24 hours as needed.  is able to stay home and she also has teenager daughter who can assist with pt's ADLs/IADLs as needed.        Entered by: Anup Sandoval 03/16/2019 12:38 PM

## 2019-03-16 NOTE — PLAN OF CARE
[id number]:    A&O, VSS, afebrile. Regular diet, tolerating. BS hypoactive. Oxycodone x 1 with some relief and OnQ pump at 7 ml/hr x 2 in place. Voiding spontaneously. LOREN with small sersang output. Biliary drain in place, clamped. Up with assist of 1 and walker

## 2019-03-17 VITALS
TEMPERATURE: 97.6 F | RESPIRATION RATE: 18 BRPM | HEIGHT: 60 IN | DIASTOLIC BLOOD PRESSURE: 71 MMHG | SYSTOLIC BLOOD PRESSURE: 112 MMHG | BODY MASS INDEX: 37.15 KG/M2 | HEART RATE: 87 BPM | WEIGHT: 189.2 LBS | OXYGEN SATURATION: 96 %

## 2019-03-17 LAB
POTASSIUM SERPL-SCNC: 3.7 MMOL/L (ref 3.4–5.3)
POTASSIUM SERPL-SCNC: 3.7 MMOL/L (ref 3.4–5.3)

## 2019-03-17 PROCEDURE — 84132 ASSAY OF SERUM POTASSIUM: CPT | Performed by: STUDENT IN AN ORGANIZED HEALTH CARE EDUCATION/TRAINING PROGRAM

## 2019-03-17 PROCEDURE — 25000128 H RX IP 250 OP 636: Performed by: SURGERY

## 2019-03-17 PROCEDURE — 25000132 ZZH RX MED GY IP 250 OP 250 PS 637: Performed by: SURGERY

## 2019-03-17 PROCEDURE — 25000132 ZZH RX MED GY IP 250 OP 250 PS 637: Performed by: STUDENT IN AN ORGANIZED HEALTH CARE EDUCATION/TRAINING PROGRAM

## 2019-03-17 PROCEDURE — 84132 ASSAY OF SERUM POTASSIUM: CPT | Performed by: SURGERY

## 2019-03-17 PROCEDURE — 36415 COLL VENOUS BLD VENIPUNCTURE: CPT | Performed by: STUDENT IN AN ORGANIZED HEALTH CARE EDUCATION/TRAINING PROGRAM

## 2019-03-17 PROCEDURE — 36415 COLL VENOUS BLD VENIPUNCTURE: CPT | Performed by: SURGERY

## 2019-03-17 RX ADMIN — SENNOSIDES AND DOCUSATE SODIUM 1 TABLET: 8.6; 5 TABLET ORAL at 08:35

## 2019-03-17 RX ADMIN — OXYCODONE HYDROCHLORIDE 5 MG: 5 TABLET ORAL at 03:52

## 2019-03-17 RX ADMIN — ACETAMINOPHEN 975 MG: 325 TABLET, FILM COATED ORAL at 08:35

## 2019-03-17 RX ADMIN — ENOXAPARIN SODIUM 40 MG: 40 INJECTION SUBCUTANEOUS at 11:50

## 2019-03-17 RX ADMIN — OXYCODONE HYDROCHLORIDE 5 MG: 5 TABLET ORAL at 08:34

## 2019-03-17 RX ADMIN — POTASSIUM CHLORIDE 20 MEQ: 1.5 POWDER, FOR SOLUTION ORAL at 00:45

## 2019-03-17 RX ADMIN — ACETAMINOPHEN 975 MG: 325 TABLET, FILM COATED ORAL at 11:50

## 2019-03-17 ASSESSMENT — ACTIVITIES OF DAILY LIVING (ADL)
ADLS_ACUITY_SCORE: 12

## 2019-03-17 ASSESSMENT — PAIN DESCRIPTION - DESCRIPTORS: DESCRIPTORS: ACHING

## 2019-03-17 NOTE — PLAN OF CARE
VSS on RA. Pain managed with scheduled Tylenol and 5 mg prn oxycodone. Up independently.  Showered.  Dressings changed to right abdomen and left Bili drain, clamped.  Patient instructed how to flush Bili drain 1 time per day with 10 ml normal saline. ML incision with dermabond. Pt. Demonstrated proper flushing today and yesterday. Tolerating regular diet.  Discharge instructions reviewed with patient, interpretor present to translate.  Discharge meds filled at  pharmacy and with patient. Patient has all belongings. Discharged to home with .

## 2019-03-17 NOTE — PROGRESS NOTES
VSS on RA. Abdominal midline incision; dermabond. Bili. drain - clamped. Regular diet; denies nausea. Voiding spontaneously with adequate UOP; voiding not saving. No IV access per patient request. PRN Oxycodone for pain. Up SBA + walker. +gas, no BM on shift. Pashto speaking; understands some English. No  needed overnight. K+ replaced (started) on evening shift; received the other 20mEq overnight; K+ recheck this AM. PLAN: Discharge home today.

## 2019-03-17 NOTE — PROGRESS NOTES
SURGICAL ONCOLOGY PROGRESS NOTE  03/17/2019   POD#4     Subjective/Interval Events  NAEON. LOREN drain out and paravertebrals out yesterday.  Tolerating regular diet.  Having bowel movements.    Objective  Temp:  [97.4  F (36.3  C)-97.9  F (36.6  C)] 97.6  F (36.4  C)  Heart Rate:  [67-92] 68  Resp:  [18] 18  BP: (112-122)/(68-72) 112/71  SpO2:  [96 %-97 %] 96 %    General: AAOx4, NAD, laying comfortably in bed  CV: warm, well perfused  Pulm: nonlabored breathing on RA  Abdomen: soft, non-distended, appropriately tender, no rebound or guarding; incision c/d/i; PTC capped  Extremities: no edema, moving all spontaneously and without apparent deficit    I/O last 3 completed shifts:  In: 970 [P.O.:970]  Out: 30 [Drains:30]    Labs:  Recent Labs   Lab 03/16/19  0721 03/15/19  0654 03/14/19  0745   WBC 7.5 9.4 11.9*   HGB 10.7* 10.4* 11.1*    259 289     Recent Labs   Lab 03/17/19  0740 03/17/19  0114 03/16/19  0721 03/15/19  0654 03/14/19 2121 03/14/19  0745   NA  --   --  139 139  --  140   POTASSIUM 3.7 3.7 3.2* 3.6 3.2* 3.2*   CHLORIDE  --   --  101 104  --  108   CO2  --   --  27 27  --  24   BUN  --   --  7 5*  --  10   CR  --   --  0.60 0.63  --  0.66   GLC  --   --  90 96  --  109*   AZEB  --   --  8.3* 8.2*  --  8.0*   MAG  --   --  1.8 1.9 2.3 1.5*   PHOS  --   --  4.0 2.7  --  4.0     Recent Labs   Lab 03/16/19  0721 03/15/19  0654 03/14/19  0745   AST 46* 44 57*   ALT 93* 95* 116*   ALKPHOS 206* 174* 178*   BILITOTAL 0.6 0.8 1.6*   ALBUMIN 2.6* 2.6* 2.6*          Assessment/Plan  38 year old woman with a h/o bile duct transection during a prior lap sheryl 1/2019 s/p prior PTC tube now s/p Leila-en-Y hepaticojejunostomy on 3/13/2019 with Dr. Morillo.    Neuro:   - Tylenol 975 TID, prn oxycodone    CV: HD stable, no acute issues    Resp: satting on RA, encourage IS    GI/FEN:    - regular diet and bowel regimen  - replete lytes prn, K per protocol  - PTC capped, flush daily with 10mL    : cordero out, voiding  well.    ID: no issues    Heme: Hgb stable    Endo: no issues     Ppx: Lovenox, OOB, IS  Activity: ambulate QID, PT/OT  Dispo: floor    Disposition Plan   Discharge today      D/w Dr. Ilia Diez MD  Surgery, PGY5  [phone]

## 2019-03-17 NOTE — PLAN OF CARE
Physical Therapy Discharge Summary    Reason for therapy discharge:    Discharged to home.    Progress towards therapy goal(s). See goals on Care Plan in [facility] electronic health record for goal details.  Goals partially met.  Barriers to achieving goals:   discharge from facility.    Therapy recommendation(s):    No further therapy is recommended. Pt active at baseline with good prognosis to progress to PLOF IND upon discharge

## 2019-03-17 NOTE — PLAN OF CARE
Assumed care of pt from [id number]. AVSS. A&Ox4. Apical pulse regular. Lungs CTA. Abdominal incision c/d/i. Bili drain clamped. Bowel sounds active in all quadrants. Tolerating regular diet. Voiding spontaneously with adequate output. Pt has no IV access per pt request. Pain controlled with Tylenol and Oxycodone. Continue with POC. Pt to discharge tomorrow. Pt would not like bedside report if awake.

## 2019-03-18 NOTE — DISCHARGE SUMMARY
[facility]   Surgical Oncology Discharge Summary    Bryanna Taylor MRN# [id number]   Age: 38 year old YOB: 1980     Date of Admission:  3/13/2019  Date of Discharge::  3/17/2019  1:00 PM  Admitting Physician:  Michael Morillo MD  Discharge Physician:  Michael Morillo MD     PCP:  No Ref-Primary, Physician    Admission Diagnosis:  Bile Duct Injury  S/p hepaticojejunostomy    Discharge Diagnosis:  Bile Duct Injury  S/p hepaticojejunostomy    Consultations:  PHYSICAL THERAPY ADULT IP CONSULT  OCCUPATIONAL THERAPY ADULT IP CONSULT    Procedures:  3/13/2019 with Dr. Morillo  Procedure(s):  Open Leila En Y Biliary Reconstruction to the Hilum with hepaticojejunostomy, Lysis of Adhesions, Intraoperative Ultrasound, Intraoperative Cholangiogram    Pathology:  None submitted    Brief HPI:  Ms. Taylor is a 38-year-old female who had a laparoscopic cholecystectomy previously which was complicated by an injury to the common bile duct leading to a completely disconnected duct.  She was temporized with external drainage and was taken to the operating room for permanent reconstruction as planned after a discussion of the risks, benefits, and alternatives.    Hospital Course:  The patient was admitted and underwent the above procedure. Her PTC tube that was previously in place was placed intraoperatively across the new hepaticojejunostomy anastomosis. The patient tolerated the procedure well and was transferred to the floor for routine postoperative cares. The patient recovered well with no post-operative complications. Her diet was advanced and she was tolerating a regular diet at the time of discharge. Her cordero was removed on POD#2 and she was able to void spontaneously. Pain was controlled with oral pain medication on the day of discharge. She was able to ambulate without difficulty. Her LOREN drain was removed on POD#3. Her PTC tube will remain in place and she underwent  teaching again on this prior to discharge. She was discharged with the PTC capped. On POD#4 the patient was discharged to home.    Prior to admission meds:  No medications prior to admission.       Meds:  Discharge Medication List as of 3/17/2019  9:01 AM      START taking these medications    Details   oxyCODONE (ROXICODONE) 5 MG tablet Take 1-2 tablets (5-10 mg) by mouth every 4 hours as needed for moderate to severe pain, Disp-25 tablet, R-0, Local Print      polyethylene glycol (MIRALAX) powder Take 17 g (1 capful) by mouth daily Take while taking narcotics to prevent constipation., Disp-510 g, R-1, E-Prescribe         CONTINUE these medications which have CHANGED    Details   acetaminophen (TYLENOL) 325 MG tablet Take 3 tablets (975 mg) by mouth 3 times daily, Disp-100 tablet, R-1, E-Prescribe      senna-docusate (SENOKOT-S/PERICOLACE) 8.6-50 MG tablet Take 1-2 tablets by mouth 2 times daily Reduce dose or hold if loose stools., Disp-60 tablet, R-1, E-Prescribe             Discharge Instructions:  Discharge Procedure Orders   Reason for your hospital stay   Order Comments: You had reconstruction of your bile duct.     Adult [facility] Follow-up and recommended labs and tests   Order Comments: FOLLOW UP  You should follow up with Dr. Morillo in 2 weeks. Our office will call you to schedule to appointment, but if you do not hear from us within 3 business days of discharge, please call us at [phone]).    Follow up with your primary care provider within 1 week to review this hospitalization. You should call their office to schedule this appointment.      Appointments on [place] and/or [facility] (with [facility] or [facility] provider or service). Call [phone] if you haven't heard regarding these appointments within 7 days of discharge.     Activity   Order Comments: See discharge instructions.     Order Specific Question Answer Comments   Is discharge order? Yes      Discharge Instructions   Order Comments:  From Dr. Morillo:    DIET  -No dietary restrictions. Eat nutritious foods that are high in lean protein to help you heal.    ACTIVITY  -No lifting, pushing, pulling greater than 20 lbs and no strenuous exercise for 6 weeks  -If your travel plans upon discharge include prolonged periods of sitting (a lengthy car or plane ride), it is highly beneficial to get up and walk at least once per hour to help prevent swelling and blood clots.   -No driving while on narcotic analgesics (i.e. Percocet, oxycodone, Vicodin)  -No driving until you can with stand pressing the brake pedal quickly and fully without pain and you are not distracted by pain.   -Take incentive spirometer home for continued frequent use.    WOUND CARE  -Inspect your wounds daily for signs of infection (increased redness, drainage, pain)  -Keep your wound clean and dry. Please refrain from applying anything, including alcohol or peroxide, to the incision.  -You may shower after operation, let warm soapy water run over incision and pat dry. Do not submerge, soak, or scrub incision.  -You have surgical superglue over the incision and your stitches are absorbable; the surgical superglue will peel off on its own in 7-14 days.  -Caution with putting ice on the incision as it will be numb you will not realize it if you leave the ice for too long and can damage your skin.    NOTIFY  Please contact Surgical Oncology (Monday through Friday 8:00am-4:30pm call the Surgical Oncology nurse care coordinator Oriana Perez at [phone]) for problems after discharge such as:  -Temperature > 101F, chills, rigors, dizziness  -Red skin around incision, drainage from incision, increased swelling from the incision, pr bleeding from the incision that is not controlled with light pressure  -Inability to tolerate diet, new nausea or vomiting  -Have severe diarrhea/constipation  -Any other questions or concerns.    At nights (after 4:30pm), on weekends, or if urgent, call  [phone] and ask the  to speak with the on-call Surgical Oncology resident  In emergencies, call 911 and/or go to the ER.    MEDICATIONS  -Some of your medications may have changed. Please take only prescribed and resumed medications.   -You should take Lovenox injections for 28 days from the day of your surgery to reduce the risk of blood clots. This has been prescribed for you.  -We recommend you take Tylenol around the clock for baseline pain control (this was prescribed for you). Then take narcotic pain medication only as needed. Once you are no longer needing narcotics, you may take the Tylenol as needed. Do not take more than 4,000 mg of acetaminophen (Tylenol) from all sources to reduce the risk of liver damage.  Avoid non-steroidal anti-inflammatory medications (Advil, Ibuprofen, Naproxen, aspirin, etc) for 5-7 days as these can increase risk of bleeding.  - Please ensure that you are drinking adequate amounts of fluids and taking stool softeners while you are taking narcotics. Take Miralax as needed for constipation.    DRAIN CARES  Care for your PTC tube as you previously had been. Flush with 10mL saline daily.  keep drain site clean and dry.   Please call the clinic (see phone numbers above) if:   Your drain falls out.   The stitch that is holding the drain in place at the skin is coming loose or missing.   The drainage fluid is very thick and/or has a bad odor.   The squeeze bulb does not stay collapsed.   The drainage suddenly stops or dramatically decreases when the drain has been having steady amounts of drainage.  Please keep a log of the drainage amounts every time you empty the drain.  Dr. Morillo will assess the drain and tentatively plan to pull the PTC drain in 6 wks.     Diet   Order Comments: See discharge instructions.     Order Specific Question Answer Comments   Is discharge order? Yes         Patient seen by chief residnet Dr. Diez who discussed with staff Dr. Morillo on day of  discharge.  - - - - - - - - - - - - - - - - - -  Lor Marroquin MD  General Surgery PGY-3  See [facility] for on call information prior to paging me directly. Pager [phone].

## 2019-03-18 NOTE — PLAN OF CARE
Occupational Therapy Discharge Summary    Reason for therapy discharge:    Discharged to home.    Progress towards therapy goal(s). See goals on Care Plan in [facility] electronic health record for goal details.  Goals partially met.  Barriers to achieving goals:   discharge from facility.    Therapy recommendation(s):    Home with A for heavy I/ADLs

## 2019-04-10 ENCOUNTER — OFFICE VISIT (OUTPATIENT)
Dept: SURGERY | Facility: CLINIC | Age: 39
End: 2019-04-10
Attending: SURGERY
Payer: MEDICAID

## 2019-04-10 VITALS
WEIGHT: 186 LBS | HEART RATE: 68 BPM | SYSTOLIC BLOOD PRESSURE: 116 MMHG | BODY MASS INDEX: 36.33 KG/M2 | DIASTOLIC BLOOD PRESSURE: 79 MMHG | OXYGEN SATURATION: 99 % | TEMPERATURE: 97.9 F

## 2019-04-10 DIAGNOSIS — S36.13XD INJURY OF BILE DUCT, SUBSEQUENT ENCOUNTER: Primary | ICD-10-CM

## 2019-04-10 PROCEDURE — G0463 HOSPITAL OUTPT CLINIC VISIT: HCPCS | Mod: ZF

## 2019-04-10 PROCEDURE — T1013 SIGN LANG/ORAL INTERPRETER: HCPCS | Mod: U3,ZF

## 2019-04-10 RX ORDER — IBUPROFEN 200 MG
200-400 TABLET ORAL
COMMUNITY

## 2019-04-10 ASSESSMENT — PAIN SCALES - GENERAL: PAINLEVEL: NO PAIN (0)

## 2019-04-10 NOTE — NURSING NOTE
Oncology Rooming Note    April 10, 2019 8:47 AM   Bryanna Taylor is a 38 year old female who presents for:    Chief Complaint   Patient presents with     Oncology Clinic Visit     Return; Leila En Y Biliary Reconstruction     Initial Vitals: Wt 84.4 kg (186 lb)   BMI 36.33 kg/m   Estimated body mass index is 36.33 kg/m  as calculated from the following:    Height as of 3/13/19: 1.524 m (5').    Weight as of this encounter: 84.4 kg (186 lb). Body surface area is 1.89 meters squared.  No Pain (0) Comment: Data Unavailable   No LMP recorded. (Menstrual status: Tubal ).  Allergies reviewed: Yes  Medications reviewed: Yes    Medications: Medication refills not needed today.  Pharmacy name entered into EPIC: Data Unavailable    Clinical concerns: Patient states there are no new concerns to discuss with provider.  Dr Morillo was not notified.         Esther Kaplan CMA

## 2019-04-10 NOTE — PATIENT INSTRUCTIONS
Below is a brief summary of your discussion and care plan from today's visit below.   ______________________________________________________________________    As discussed with Dr. Morillo we will proceed with the following:     - Please schedule IR Cholangiogram for the drain removal.     - The number for release of information for your records is [phone]  ______________________________________________________________________    It was a pleasure seeing you in clinic today - please be in touch if there are any further questions that arise following today's visit.  During business hours, you may reach my Clinic Coordinator at [phone].  For urgent/emergent questions after business hours, you may reach the on-call Surgery Resident by contacting the [facility]  at [phone].    Any benign/non-urgent test results are usually communicated via letter or [employer] [facility] message within 1-2 weeks after completion.  Urgent results (those that require a change in the previously-discussed care plan) are usually communicated via a phone call once available from our clinic staff to discuss the results and the next steps in your evaluation.    I recommend signing up for [employer] access if you have not already done so and are comfortable with using a computer.  This allows for online access to your lab results and also helps you communicate efficiently with my clinic should any questions arise in your care.      Sincerely,    Oriana Perez RN  Care Coordinator -   Phone: [phone]  Fax: [phone]

## 2019-04-10 NOTE — LETTER
4/10/2019       RE: Bryanna Taylor  [address] [facility] [address]     Dear Colleague,    Thank you for referring your patient, Bryanna Taylor, to the [facility]. Please see a copy of my visit note below.    Looks great  Feels well  No complaints  Has had occasional chills but none recently  PTC has been capped with flushes    Abd benign - incision healing well    AP - doing well  Will refer to IR for cholangiogram and PTC removal asap  Follow-up with me PRN for any concerns  I encouraged her to call with any future problems or questions.    Again, thank you for allowing me to participate in the care of your patient.      Sincerely,    Michael Morillo MD

## 2019-04-10 NOTE — PROGRESS NOTES
Looks great  Feels well  No complaints  Has had occasional chills but none recently  PTC has been capped with flushes    Abd benign - incision healing well    AP - doing well  Will refer to IR for cholangiogram and PTC removal asap  Follow-up with me PRN for any concerns  I encouraged her to call with any future problems or questions.

## 2019-04-12 ENCOUNTER — TELEPHONE (OUTPATIENT)
Dept: INTERVENTIONAL RADIOLOGY/VASCULAR | Facility: CLINIC | Age: 39
End: 2019-04-12

## 2019-04-17 ENCOUNTER — APPOINTMENT (OUTPATIENT)
Dept: INTERVENTIONAL RADIOLOGY/VASCULAR | Facility: CLINIC | Age: 39
End: 2019-04-17
Attending: NURSE PRACTITIONER
Payer: MEDICAID

## 2019-04-17 ENCOUNTER — HOSPITAL ENCOUNTER (OUTPATIENT)
Facility: CLINIC | Age: 39
Discharge: HOME | End: 2019-04-17
Attending: RADIOLOGY | Admitting: RADIOLOGY
Payer: MEDICAID

## 2019-04-17 ENCOUNTER — APPOINTMENT (OUTPATIENT)
Dept: MEDSURG UNIT | Facility: CLINIC | Age: 39
End: 2019-04-17
Attending: RADIOLOGY
Payer: MEDICAID

## 2019-04-17 VITALS
HEART RATE: 70 BPM | SYSTOLIC BLOOD PRESSURE: 122 MMHG | TEMPERATURE: 97.7 F | OXYGEN SATURATION: 98 % | DIASTOLIC BLOOD PRESSURE: 68 MMHG | RESPIRATION RATE: 16 BRPM

## 2019-04-17 DIAGNOSIS — S36.13XD: Primary | ICD-10-CM

## 2019-04-17 DIAGNOSIS — S36.129D: Primary | ICD-10-CM

## 2019-04-17 DIAGNOSIS — S36.13XD INJURY OF BILE DUCT, SUBSEQUENT ENCOUNTER: ICD-10-CM

## 2019-04-17 LAB
B-HCG SERPL-ACNC: <1 IU/L (ref 0–5)
CREAT SERPL-MCNC: 0.56 MG/DL (ref 0.52–1.04)
GFR SERPL CREATININE-BSD FRML MDRD: >90 ML/MIN/{1.73_M2}

## 2019-04-17 PROCEDURE — 84702 CHORIONIC GONADOTROPIN TEST: CPT | Performed by: RADIOLOGY

## 2019-04-17 PROCEDURE — T1013 SIGN LANG/ORAL INTERPRETER: HCPCS | Mod: U3

## 2019-04-17 PROCEDURE — 25000125 ZZHC RX 250: Performed by: PHYSICIAN ASSISTANT

## 2019-04-17 PROCEDURE — 40000166 ZZH STATISTIC PP CARE STAGE 1

## 2019-04-17 PROCEDURE — C1729 CATH, DRAINAGE: HCPCS

## 2019-04-17 PROCEDURE — 47536 EXCHANGE BILIARY DRG CATH: CPT

## 2019-04-17 PROCEDURE — 25800030 ZZH RX IP 258 OP 636: Performed by: PHYSICIAN ASSISTANT

## 2019-04-17 PROCEDURE — 82565 ASSAY OF CREATININE: CPT | Performed by: RADIOLOGY

## 2019-04-17 PROCEDURE — 25000128 H RX IP 250 OP 636: Performed by: PHYSICIAN ASSISTANT

## 2019-04-17 PROCEDURE — 27210886 ZZH ACCESSORY CR5

## 2019-04-17 PROCEDURE — 27210903 ZZH KIT CR5

## 2019-04-17 PROCEDURE — 25500064 ZZH RX 255 OP 636: Performed by: RADIOLOGY

## 2019-04-17 PROCEDURE — C1769 GUIDE WIRE: HCPCS

## 2019-04-17 RX ORDER — AMPICILLIN AND SULBACTAM 2; 1 G/1; G/1
3 INJECTION, POWDER, FOR SOLUTION INTRAMUSCULAR; INTRAVENOUS
Status: COMPLETED | OUTPATIENT
Start: 2019-04-17 | End: 2019-04-17

## 2019-04-17 RX ORDER — LIDOCAINE 40 MG/G
CREAM TOPICAL
Status: DISCONTINUED | OUTPATIENT
Start: 2019-04-17 | End: 2019-04-17 | Stop reason: HOSPADM

## 2019-04-17 RX ORDER — SODIUM CHLORIDE 9 MG/ML
INJECTION, SOLUTION INTRAVENOUS CONTINUOUS
Status: DISCONTINUED | OUTPATIENT
Start: 2019-04-17 | End: 2019-04-17 | Stop reason: HOSPADM

## 2019-04-17 RX ORDER — IODIXANOL 320 MG/ML
50 INJECTION, SOLUTION INTRAVASCULAR ONCE
Status: COMPLETED | OUTPATIENT
Start: 2019-04-17 | End: 2019-04-17

## 2019-04-17 RX ORDER — DEXTROSE MONOHYDRATE 25 G/50ML
25-50 INJECTION, SOLUTION INTRAVENOUS
Status: DISCONTINUED | OUTPATIENT
Start: 2019-04-17 | End: 2019-04-17 | Stop reason: HOSPADM

## 2019-04-17 RX ORDER — NICOTINE POLACRILEX 4 MG
15-30 LOZENGE BUCCAL
Status: DISCONTINUED | OUTPATIENT
Start: 2019-04-17 | End: 2019-04-17 | Stop reason: HOSPADM

## 2019-04-17 RX ADMIN — LIDOCAINE HYDROCHLORIDE 5 ML: 10 INJECTION, SOLUTION EPIDURAL; INFILTRATION; INTRACAUDAL; PERINEURAL at 09:20

## 2019-04-17 RX ADMIN — SODIUM CHLORIDE: 9 INJECTION, SOLUTION INTRAVENOUS at 07:33

## 2019-04-17 RX ADMIN — AMPICILLIN SODIUM AND SULBACTAM SODIUM 3 G: 2; 1 INJECTION, POWDER, FOR SOLUTION INTRAMUSCULAR; INTRAVENOUS at 07:35

## 2019-04-17 RX ADMIN — IODIXANOL 15 ML: 320 INJECTION, SOLUTION INTRAVASCULAR at 08:45

## 2019-04-17 NOTE — IP AVS SNAPSHOT
[address] [address]                                    After Visit Summary   4/17/2019    Bryanna Taylor    MRN: [id number]           After Visit Summary Signature Page    I have received my discharge instructions, and my questions have been answered. I have discussed any challenges I see with this plan with the nurse or doctor.    ..........................................................................................................................................  Patient/Patient Representative Signature      ..........................................................................................................................................  Patient Representative Print Name and Relationship to Patient    ..................................................               ................................................  Date                                   Time    ..........................................................................................................................................  Reviewed by Signature/Title    ...................................................              ..............................................  Date                                               Time          22EPIC Rev 08/18

## 2019-04-17 NOTE — DISCHARGE INSTRUCTIONS
"[facility]  Interventional Radiology   Biliary Tube Instructions      AFTER YOU GO HOME:    Have an adult stay with you for 24 hours.     Drink plenty of fluids and resume your regular diet.    For 24 hours:       Do not drive or operate machinery at home or at work    No alcoholic beverages    Do not make any important legal decisions    No heavy lifting greater than 10 lb until instructed by your physician     Call Your Physician if:    You develop nausea or vomiting.    Your develop hives or rash or unexplained itching    Additional Instructions: Please call for the following problems:    No fluid draining from the tube, check that the tube is not kinked or if stop cock is closed.    Flush with 10 cc's Normal Saline, empty bag and monitor output daily subtracting amount inserted.    Skin around tube is red, painful or has any drainage.    You have increased pain in your abdomen    Fever greater than 100.5 F and chills    You feel nauseated and  \"just not right\"      Change dressing every 48 hour or when it gets wet    Interventional Radiology Department    Physician: Dr. León                         Date:April 17, 2019  Telehone numbers: [phone]...Monday-Friday 8:00am-4:30pm                                  [phone]... After 4:30 Monday-Friday, Weekends and Holiday. Ask for the Interverntional Radiologist on call. Someone is on call 24 hours a day.                                  "

## 2019-04-17 NOTE — PROGRESS NOTES
Patient Name: Bryanna Taylor  Medical Record Number: [id number]  Today's Date: 4/17/2019    Procedure: Biliary Tube Exchange  Proceduralist: Dr. Montana León and Dr. Hermilo Love    **No Sedation**    Procedure start time: 0900  Puncture time: n/a, existing tract  Procedure end time: 0923    Report given to: BRY Pacheco 2A  : BETHANY Woo (American)    Other Notes: Pt arrived to IR room #5 from [address]. Consent reviewed. Pt denies any questions or concerns regarding procedure. Pt positioned supine and monitored per protocol. 10 fr Flexima APD Biliary Tube Changed by Dr. Love. Pt tolerated procedure without any noted complications. Pt transferred back to [address].

## 2019-04-17 NOTE — PROCEDURES
Interventional Radiology Brief Post Procedure Note    Procedure: cholangiogram and biliary tube exchange and reposition    Proceduralist: Hermilo Love MD and Montana León MD    Assistant: None    Time Out: Prior to the start of the procedure and with procedural staff participation, I verbally confirmed the patient s identity using two indicators, relevant allergies, that the procedure was appropriate and matched the consent or emergent situation, and that the correct equipment/implants were available. Immediately prior to starting the procedure I conducted the Time Out with the procedural staff and re-confirmed the patient s name, procedure, and site/side. (The Joint Commission universal protocol was followed.)  Yes        Sedation: None. Local Anesthestic used    Findings: Tube check demonstrates biliary tube to be across the biliary anastamosis.  Tube exchanged over wire for new tube across the L and right ducts externalized.     Estimated Blood Loss: Minimal    Fluoroscopy Time: 2.3 minute(s)    SPECIMENS: None    Complications: 1. None     Condition: Stable    Plan:   -Tube to remain capped to allow for antegrade drainage across the anastamosis.   -Pt to return in 1 week for follow up cholangiogram and tube removal if no e/o obstruction. .     Comments: See dictated procedure note for full details.    Hermilo Love MD

## 2019-04-17 NOTE — PROGRESS NOTES
IV discontinued. Discharge instructions reviewed with patient and the . F/u appointment made for April 24th.

## 2019-04-25 ENCOUNTER — TELEPHONE (OUTPATIENT)
Dept: INTERVENTIONAL RADIOLOGY/VASCULAR | Facility: CLINIC | Age: 39
End: 2019-04-25

## 2019-04-29 ENCOUNTER — APPOINTMENT (OUTPATIENT)
Dept: MEDSURG UNIT | Facility: CLINIC | Age: 39
End: 2019-04-29
Attending: RADIOLOGY
Payer: MEDICAID

## 2019-04-29 ENCOUNTER — HOSPITAL ENCOUNTER (OUTPATIENT)
Facility: CLINIC | Age: 39
Discharge: HOME | End: 2019-04-29
Attending: RADIOLOGY | Admitting: RADIOLOGY
Payer: MEDICAID

## 2019-04-29 ENCOUNTER — APPOINTMENT (OUTPATIENT)
Dept: INTERVENTIONAL RADIOLOGY/VASCULAR | Facility: CLINIC | Age: 39
End: 2019-04-29
Attending: NURSE PRACTITIONER
Payer: MEDICAID

## 2019-04-29 VITALS
BODY MASS INDEX: 37.3 KG/M2 | HEIGHT: 60 IN | TEMPERATURE: 99 F | DIASTOLIC BLOOD PRESSURE: 71 MMHG | WEIGHT: 190 LBS | RESPIRATION RATE: 16 BRPM | HEART RATE: 102 BPM | OXYGEN SATURATION: 98 % | SYSTOLIC BLOOD PRESSURE: 122 MMHG

## 2019-04-29 DIAGNOSIS — S36.129D: ICD-10-CM

## 2019-04-29 DIAGNOSIS — S36.13XD: ICD-10-CM

## 2019-04-29 PROCEDURE — 25000128 H RX IP 250 OP 636: Performed by: PHYSICIAN ASSISTANT

## 2019-04-29 PROCEDURE — T1013 SIGN LANG/ORAL INTERPRETER: HCPCS | Mod: U3

## 2019-04-29 PROCEDURE — G0463 HOSPITAL OUTPT CLINIC VISIT: HCPCS

## 2019-04-29 PROCEDURE — 40000166 ZZH STATISTIC PP CARE STAGE 1

## 2019-04-29 PROCEDURE — 25800030 ZZH RX IP 258 OP 636: Performed by: PHYSICIAN ASSISTANT

## 2019-04-29 RX ORDER — AMPICILLIN AND SULBACTAM 2; 1 G/1; G/1
3 INJECTION, POWDER, FOR SOLUTION INTRAMUSCULAR; INTRAVENOUS
Status: COMPLETED | OUTPATIENT
Start: 2019-04-29 | End: 2019-04-29

## 2019-04-29 RX ORDER — SODIUM CHLORIDE 9 MG/ML
INJECTION, SOLUTION INTRAVENOUS CONTINUOUS
Status: DISCONTINUED | OUTPATIENT
Start: 2019-04-29 | End: 2019-04-29 | Stop reason: HOSPADM

## 2019-04-29 RX ORDER — LIDOCAINE 40 MG/G
CREAM TOPICAL
Status: DISCONTINUED | OUTPATIENT
Start: 2019-04-29 | End: 2019-04-29 | Stop reason: HOSPADM

## 2019-04-29 RX ADMIN — SODIUM CHLORIDE: 9 INJECTION, SOLUTION INTRAVENOUS at 12:01

## 2019-04-29 RX ADMIN — AMPICILLIN SODIUM AND SULBACTAM SODIUM 3 G: 2; 1 INJECTION, POWDER, FOR SOLUTION INTRAMUSCULAR; INTRAVENOUS at 12:01

## 2019-04-29 ASSESSMENT — MIFFLIN-ST. JEOR: SCORE: 1458.33

## 2019-04-29 NOTE — PROGRESS NOTES
Pt back from IR s/p biliary tube removal.  VSS.  Pt alert and oriented x4.  Pt c/o slight discomfort at the upper abd site.  Upper abd site F/D/I.   at the bedside.

## 2019-04-29 NOTE — PROGRESS NOTES
Patient prepped and ready for procedure. Needs consent completed. No labs sent. Antibiotic infusing.

## 2019-04-29 NOTE — PROGRESS NOTES
Patient Name: Bryanna Taylor  Medical Record Number: [id number]  Today's Date: 4/29/2019    Procedure: biliary tube removal  Proceduralist: Dr. Thiago LOBO    No sedation per providers    Patient in room:  1229  Procedure start time: 1244  Procedure end time: 1246  Patient out of room:  1249    Report given to: tammy carson  : Ralph from [facility]  services    Other Notes: Pt arrived to IR room 3 from . Consent reviewed. Pt denies any questions or concerns regarding procedure. Pt positioned supine and monitored per protocol. Pt tolerated procedure without any noted complications. Pt transferred back to .

## 2019-04-29 NOTE — IP AVS SNAPSHOT
[address] [address]                                    After Visit Summary   4/29/2019    Bryanna Taylor    MRN: [id number]           After Visit Summary Signature Page    I have received my discharge instructions, and my questions have been answered. I have discussed any challenges I see with this plan with the nurse or doctor.    ..........................................................................................................................................  Patient/Patient Representative Signature      ..........................................................................................................................................  Patient Representative Print Name and Relationship to Patient    ..................................................               ................................................  Date                                   Time    ..........................................................................................................................................  Reviewed by Signature/Title    ...................................................              ..............................................  Date                                               Time          22EPIC Rev 08/18

## 2019-04-29 NOTE — DISCHARGE INSTRUCTIONS
"[facility]  Interventional Radiology   Drainage Tube  Removal Instructions      AFTER YOU GO HOME:    Have an adult stay with you for 24 hours.     Drink plenty of fluids and resume your regular diet.    For 24 hours:       Do not drive or operate machinery at home or at work    No alcoholic beverages    Do not make any important legal decisions    No heavy lifting greater than 10 lb until instructed by your physician     Call Your Physician if:    You develop nausea or vomiting.    Your develop hives or rash or unexplained itching    Additional Instructions: Please call for the following problems:    Skin around tube is red, painful or has any drainage.    You have increased pain in your abdomen    Fever greater than 100.5 F and chills    You feel nauseated and  \"just not right\"       Interventional Radiology Department    Physician:   Dr. Thiago PRUITT  Date:April 29, 2019    Telehone numbers: [phone]...Monday-Friday 8:00am-4:30pm                                  [phone]... After 4:30 Monday-Friday, Weekends and Holiday. Ask for the Interverntional Radiologist on call.                                   Someone is on call 24 hours a day.                                  "

## 2019-04-29 NOTE — PROGRESS NOTES
Discharge instructions given and pt voiced understanding. No scripts needed from pharmacy. Right upper abd  site is soft and flat. No hematoma. No sedation with procedure. Up walking in room. Ate well for lunch. Adequate for discharge. Discharged to home with Uber lift.

## 2021-01-14 NOTE — DISCHARGE INSTRUCTIONS
Establishment of Primary Care Provider Appointment    [facility] [place], operated [facility]-[address], [place]   [address]    Clinic phone number- [phone]    Name of new Primary Care Provider: JOSSE Roman    Clinic Appointment Date:  Monday January 21, 2019    Time: 3:00 p.m.    Please bring your ID, Insurance Card and Medication List with you to your appointment.                           [FreeTextEntry1] : Impression: posterior neck mass

## 2023-05-25 NOTE — CONSULTS
Patient is on IR schedule 1/16/19 for a percutaneous transhepatic cholangiography    Labs WNL for procedure.    Orders for NPO, scrubs and antibiotics have been entered.   Consent will be done prior to procedure.     S/p lap sheryl  at OSH, bile duct injury MRCP done 1/13/19 show significant leak.1/15/19- uncomplbiliary sphincterotomy observed complete common duct obstruction by surgical clips perhaps following upstream intraoperative transection.    [place] the [place] [phone] for estimated time of procedure.     Discussed with Dr. Arslan Matute IR RPA  [phone] Call pager  [phone] pager           Closure 2 Information: This tab is for additional flaps and grafts, including complex repair and grafts and complex repair and flaps. You can also specify a different location for the additional defect, if the location is the same you do not need to select a new one. We will insert the automated text for the repair you select below just as we do for solitary flaps and grafts. Please note that at this time if you select a location with a different insurance zone you will need to override the ICD10 and CPT if appropriate.

## 2023-10-16 NOTE — PROCEDURES
Interventional Radiology Brief Post Procedure Note    Procedure: IR BILIARY DRAIN PLACEMENT    Proceduralist: Pete Paredes MD    Assistant: Thiago Garcia MD and None    Time Out: Prior to the start of the procedure and with procedural staff participation, I verbally confirmed the patient s identity using two indicators, relevant allergies, that the procedure was appropriate and matched the consent or emergent situation, and that the correct equipment/implants were available. Immediately prior to starting the procedure I conducted the Time Out with the procedural staff and re-confirmed the patient s name, procedure, and site/side. (The Joint Commission universal protocol was followed.)  Yes    Medications   Medication Event Details Admin User Admin Time       Sedation: General Endotracheal Anesthesia (GET) administered and documented by Anesthesia Care Provider    Findings: The biliary could not be placed due to non-dilated biliary ducts.     Estimated Blood Loss: Less than 10 ml    Fluoroscopy Time: 46.6 minute(s)    SPECIMENS: None    Complications: 1. None     Condition: Stable    Plan: Bedrest for 3 hours.    Comments: See dictated procedure note for full details.    Thiago Garcia MD       PT returning missed call. Pt states she is still in a lot of pain due to surgery and is requesting a call back.

## 2024-02-28 NOTE — H&P
Pre-Operative H & P     CC:  Preoperative exam to assess for increased cardiopulmonary risk while undergoing surgery and anesthesia.    Date of Encounter: 3/7/2019  Primary Care Physician:  No Ref-Primary, Physician    Reason for visit:  Preop examination  Bile duct leak      HPI  Bryanna Taylor is a 38 year old female who presents for pre-operative H & P in preparation for Open Leila En Y Biliary Reconstruction on 3/13/2019 by Dr. Morillo in treatment of bile duct leak   at [facility].     History is obtained from the patient.      Previous anesthesia, last at [facility], without complications     1) Cardiac: No known cardiac diagnosis or symptoms. METS >4  2) Pulmonary:  Never smoked, denies pulmonary symptoms  3) GI: Bile duct transection during a laparoscopic cholecystectomy on 01/14. PTC drain placed by IR. She currently has 2 drains in place, both connected to bags with drainage only in the left bag.  4) Endo: BMI 47           Past Medical History  Past Medical History:   Diagnosis Date     Bile duct injury        Past Surgical History  Past Surgical History:   Procedure Laterality Date     ENDOSCOPIC RETROGRADE CHOLANGIOPANCREATOGRAM N/A 1/15/2019    Procedure: ENDOSCOPIC RETROGRADE CHOLANGIOPANCREATOGRAM, biliary sphincterotomy;  Surgeon: Ryan Costa MD;  Location: [facility] OR     IR BILIARY DRAIN PLACEMENT  1/16/2019     IR CHOLIANGIOGRAM (VIA A NEEDLE/ NO EXISTING TUBE)  2/5/2019       Hx of Blood transfusions/reactions: none    Hx of abnormal bleeding or anti-platelet use: none    Menstrual history: Patient's last menstrual period was 02/21/2019 (approximate).:     Steroid use in the last year: none    Personal or FH with difficulty with Anesthesia:  None        Prior to Admission Medications  Current Outpatient Medications   Medication Sig Dispense Refill     acetaminophen (TYLENOL) 325 MG tablet Take 2 tablets (650 mg) by mouth every 4 hours as needed for  Followed up with Dr. Santillan regarding down-trending blood pressures for patient in PACU after labetalol administration; approval given to discharge patient.   mild pain 50 tablet 0     oxyCODONE (ROXICODONE) 5 MG tablet Take 1-2 tablets (5-10 mg) by mouth every 4 hours as needed for moderate to severe pain 20 tablet 0       Allergies  No Known Allergies    Social History  Social History     Socioeconomic History     Marital status:      Spouse name: Not on file     Number of children: Not on file     Years of education: Not on file     Highest education level: Not on file   Occupational History     Not on file   Social Needs     Financial resource strain: Not on file     Food insecurity:     Worry: Not on file     Inability: Not on file     Transportation needs:     Medical: Not on file     Non-medical: Not on file   Tobacco Use     Smoking status: Never Smoker     Smokeless tobacco: Never Used   Substance and Sexual Activity     Alcohol use: No     Frequency: Never     Drug use: No     Sexual activity: No   Lifestyle     Physical activity:     Days per week: Not on file     Minutes per session: Not on file     Stress: Not on file   Relationships     Social connections:     Talks on phone: Not on file     Gets together: Not on file     Attends Orthodoxy service: Not on file     Active member of club or organization: Not on file     Attends meetings of clubs or organizations: Not on file     Relationship status: Not on file     Intimate partner violence:     Fear of current or ex partner: Not on file     Emotionally abused: Not on file     Physically abused: Not on file     Forced sexual activity: Not on file   Other Topics Concern     Parent/sibling w/ CABG, MI or angioplasty before 65F 55M? Not Asked   Social History Narrative     Not on file       Family History  No family history on file.          Anesthesia Pre-Procedure Evaluation    Patient: Bryanna Taylor   MRN:     [id number] Gender:   female   Age:    38 year old :      1980        Preoperative Diagnosis: Bile Duct Injury   Procedure(s):  Open Leila En Y Biliary Reconstruction     Past Medical  History:   Diagnosis Date     Bile duct injury       Past Surgical History:   Procedure Laterality Date     ENDOSCOPIC RETROGRADE CHOLANGIOPANCREATOGRAM N/A 1/15/2019    Procedure: ENDOSCOPIC RETROGRADE CHOLANGIOPANCREATOGRAM, biliary sphincterotomy;  Surgeon: Ryan Costa MD;  Location: [facility] OR     IR BILIARY DRAIN PLACEMENT  1/16/2019     IR CHOLIANGIOGRAM (VIA A NEEDLE/ NO EXISTING TUBE)  2/5/2019          Anesthesia Evaluation     . Pt has had prior anesthetic. Type: General    No history of anesthetic complications          ROS/MED HX    ENT/Pulmonary:  - neg pulmonary ROS     Neurologic:  - neg neurologic ROS     Cardiovascular:  - neg cardiovascular ROS      (-) arrhythmias and irregular heartbeat/palpitations   METS/Exercise Tolerance:  4 - Raking leaves, gardening   Hematologic:         Musculoskeletal:         GI/Hepatic:     (+) cholecystitis/cholelithiasis (c/b bile duct injury), Other GI/Hepatic bile duct injury     (-) GERD   Renal/Genitourinary:  - ROS Renal section negative    (-) renal disease   Endo:  - neg endo ROS       Psychiatric:         Infectious Disease:  - neg infectious disease ROS       Malignancy:      - no malignancy   Other:    (+) No chance of pregnancy C-spine cleared: No, no H/O Chronic Pain,no other significant disability   - neg other ROS                 PHYSICAL EXAM:   Mental Status/Neuro: A/A/O   Airway: Facies: Feasible  Mallampati: II  Mouth/Opening: Full  TM distance: > 6 cm  Neck ROM: Full   Respiratory: Auscultation: CTAB     Resp. Rate: Normal     Resp. Effort: Normal      CV: Rhythm: Regular  Rate: Age appropriate  Heart: Normal Sounds   Comments:      Dental: Normal                    Preop Vitals  BP Readings from Last 3 Encounters:   02/22/19 125/86   02/06/19 100/62   01/18/19 140/85    Pulse Readings from Last 3 Encounters:   02/22/19 66   02/05/19 61   01/18/19 64      Resp Readings from Last 3 Encounters:   02/22/19 18   02/06/19 18   01/18/19 18    SpO2  "Readings from Last 3 Encounters:   02/22/19 100%   02/06/19 96%   01/18/19 98%      Temp Readings from Last 1 Encounters:   02/22/19 98  F (36.7  C) (Oral)    Ht Readings from Last 1 Encounters:   02/22/19 1.346 m (4' 5\")      Wt Readings from Last 1 Encounters:   02/22/19 85.9 kg (189 lb 6.4 oz)    Estimated body mass index is 47.41 kg/m  as calculated from the following:    Height as of 2/22/19: 1.346 m (4' 5\").    Weight as of 2/22/19: 85.9 kg (189 lb 6.4 oz).         Temp: 98.3  F (36.8  C) Temp src: Oral BP: 122/76 Pulse: 80 Heart Rate: 80 Resp: 18 SpO2: 95 %         189 lbs 8 oz  4' 5\"   Body mass index is 47.43 kg/m .       Physical Exam  Constitutional: Awake, alert, cooperative, no apparent distress, and appears stated age.  Eyes: Pupils equal, round and reactive to light, extra ocular muscles intact, sclera clear, conjunctiva normal.  HENT: Normocephalic, oral pharynx with moist mucus membranes, good dentition. No goiter appreciated.   Respiratory: Clear to auscultation bilaterally, no crackles or wheezing.  Cardiovascular: Regular rate and rhythm, normal S1 and S2, and no murmur noted.  Carotids +2, no bruits. No edema. Palpable pulses to radial  DP and PT arteries.   GI: Normal bowel sounds, soft, non-distended, non-tender, 2 drain in place, both connected to bags   Lymph/Hematologic: No cervical lymphadenopathy and no supraclavicular lymphadenopathy.  Genitourinary:  Deferred   Skin: Warm and dry.  No rashes at anticipated surgical site.   Musculoskeletal: Full ROM of neck. There is no redness, warmth, or swelling of the joints. Gross motor strength is normal.    Neurologic: Awake, alert, oriented to name, place and time. Cranial nerves II-XII are grossly intact. Gait is normal.   Neuropsychiatric: Calm, cooperative. Normal affect.     Labs: (personally reviewed)  Results for PATRIZIA CONRAD EDGAR (MRN [id number]) as of 3/7/2019 11:39   Ref. Range 3/7/2019 10:09   Sodium Latest Ref Range: 133 - 144 " mmol/L 136   Potassium Latest Ref Range: 3.4 - 5.3 mmol/L 4.0   Chloride Latest Ref Range: 94 - 109 mmol/L 105   Carbon Dioxide Latest Ref Range: 20 - 32 mmol/L 27   Urea Nitrogen Latest Ref Range: 7 - 30 mg/dL 7   Creatinine Latest Ref Range: 0.52 - 1.04 mg/dL 0.53   GFR Estimate Latest Ref Range: >60 mL/min/1.73_m2 >90   GFR Estimate If Black Latest Ref Range: >60 mL/min/1.73_m2 >90   Calcium Latest Ref Range: 8.5 - 10.1 mg/dL 8.3 (L)   Anion Gap Latest Ref Range: 3 - 14 mmol/L 4   Albumin Latest Ref Range: 3.4 - 5.0 g/dL 3.2 (L)   Protein Total Latest Ref Range: 6.8 - 8.8 g/dL 7.7   Bilirubin Total Latest Ref Range: 0.2 - 1.3 mg/dL 0.4   Alkaline Phosphatase Latest Ref Range: 40 - 150 U/L 402 (H)   ALT Latest Ref Range: 0 - 50 U/L 107 (H)   AST Latest Ref Range: 0 - 45 U/L 35   Glucose Latest Ref Range: 70 - 99 mg/dL 88   WBC Latest Ref Range: 4.0 - 11.0 10e9/L 6.2   Hemoglobin Latest Ref Range: 11.7 - 15.7 g/dL 11.4 (L)   Hematocrit Latest Ref Range: 35.0 - 47.0 % 37.1   Platelet Count Latest Ref Range: 150 - 450 10e9/L 278   RBC Count Latest Ref Range: 3.8 - 5.2 10e12/L 4.20   MCV Latest Ref Range: 78 - 100 fl 88   MCH Latest Ref Range: 26.5 - 33.0 pg 27.1   MCHC Latest Ref Range: 31.5 - 36.5 g/dL 30.7 (L)   RDW Latest Ref Range: 10.0 - 15.0 % 13.3   INR Latest Ref Range: 0.86 - 1.14  1.11   ABO Unknown O   RH(D) Unknown Pos   Antibody Screen Unknown Neg   Test Valid Only At Latest Units:     [facility]   Specimen Expires Unknown 03/10/2019         EKG: Personally reviewed but formal cardiology read pending: not indicated      Outside records reviewed from: care everywhere        ASSESSMENT and PLAN  Bryanna Taylor is a 38 year old female scheduled to undergo Open Leila En Y Biliary Reconstruction on 3/13/2019 by Dr. Morillo in treatment of bile duct leak      . She has the following specific operative considerations:   - RCRI : Low serious cardiac risks.  0.9% risk of major adverse cardiac event.    - Anesthesia considerations:  Refer to PAC assessment in anesthesia records  - VTE risk: 0.5%  - MARIAM # of risks 2/8 = low risk  - Post-op delirium risk: low risk  - Risk of PONV score = 2.  If > 2, anti-emetic intervention recommended.        I spent 20 minutes with patient, greater than 50% educating on preop meds, counseling on anesthesia and coordinating care for surgery  All labs and imaging personally reviewed.     Pt optimized for surgery. AVS with information on surgery time/arrival time, meds and NPO status given by nursing staff      Patient was discussed with Dr Jones.    PERLA Adkins CNS  Preoperative Assessment Center  [facility]  Clinic and Surgery Center  Phone: [phone]  Fax: [phone]

## (undated) DEVICE — BIOPSY VALVE BIOSHIELD 00711135

## (undated) DEVICE — SU SILK 4-0 TIE 12X30" A303H

## (undated) DEVICE — STPL ENDO LINEAR CUT ECHELON GST 60MM COMPACT PCEE60A

## (undated) DEVICE — Device

## (undated) DEVICE — DRSG GAUZE 4X4" 2187

## (undated) DEVICE — SU PDS II 4-0 RB-1 27" Z304H

## (undated) DEVICE — SURGICEL ABSORBABLE HEMOSTAT SNOW 4"X4" 2083

## (undated) DEVICE — CLIP HORIZON LG ORANGE 004200

## (undated) DEVICE — TAPE CLOTH 3" CARDINAL 3TRCL03

## (undated) DEVICE — ESU GROUND PAD ADULT W/CORD E7507

## (undated) DEVICE — CONNECTOR STOPCOCK 3 WAY MALE LL HI-FLO MX9311L

## (undated) DEVICE — SU SILK 3-0 SH CR 8X18" C013D

## (undated) DEVICE — ENDO BITE BLOCK ADULT OMNI-BLOC

## (undated) DEVICE — SOL WATER IRRIG 1000ML BOTTLE 2F7114

## (undated) DEVICE — DRAIN JACKSON PRATT CHANNEL 15FR ROUND HUBLESS SIL JP-2228

## (undated) DEVICE — STPL LINEAR CUT 60X3.5MM TX60B

## (undated) DEVICE — ENDO FUSION OMNI-TOME G31903

## (undated) DEVICE — SU PDS II 0 TP-1 60" Z991G

## (undated) DEVICE — SU DERMABOND ADVANCED .7ML DNX12

## (undated) DEVICE — DRSG PRIMAPORE 02X3" 7133

## (undated) DEVICE — STPL RELOAD REG TISSUE ECHELON 60 X 3.6MM BLUE GST60B

## (undated) DEVICE — DRAPE IOBAN INCISE 23X17" 6650EZ

## (undated) DEVICE — ESU ELEC NDL 1" COATED/INSULATED E1465

## (undated) DEVICE — NDL SPINAL 22GA 3.5" QUINCKE 405181

## (undated) DEVICE — SU SILK 3-0 TIE 12X30" A304H

## (undated) DEVICE — LINEN TOWEL PACK X30 5481

## (undated) DEVICE — ENDO DEVICE LOCKING AND BIOPSY CAP M00545261

## (undated) DEVICE — SU ETHILON 3-0 PS-1 18" 1663H

## (undated) DEVICE — CLIP HORIZON SM RED WIDE SLOT 001201

## (undated) DEVICE — SU PROLENE 6-0 RB-2DA 30" 8711H

## (undated) DEVICE — DRAIN JACKSON PRATT RESERVOIR 100ML SU130-1305

## (undated) DEVICE — SU PROLENE 4-0 RB-1DA 36" 8557H

## (undated) DEVICE — SU PROLENE 3-0 SHDA 36" 8522H

## (undated) DEVICE — NDL ECLIPSE 23GA 1" 305762

## (undated) DEVICE — CATH TRAY FOLEY SURESTEP 16FR W/URNE MTR STLK LATEX A303316A

## (undated) DEVICE — ESU HOLSTER PLASTIC DISP E2400

## (undated) DEVICE — NDL BLUNT 18GA 1" W/O FILTER 305181

## (undated) DEVICE — SU SILK 2-0 TIE 12X30" A305H

## (undated) DEVICE — SU UMBILICAL TAPE .125X30" U11T

## (undated) DEVICE — NDL COUNTER 20CT 31142493

## (undated) DEVICE — GLOVE PROTEXIS BLUE W/NEU-THERA 8.0  2D73EB80

## (undated) DEVICE — PREP CHLORAPREP 26ML TINTED ORANGE  260815

## (undated) DEVICE — WIPES FOLEY CARE SURESTEP PROVON DFC100

## (undated) DEVICE — DRSG MEDIPORE 3 1/2X13 3/4" 3573

## (undated) DEVICE — ENDO TUBING CO2 SMARTCAP STERILE DISP 100145CO2EXT

## (undated) DEVICE — SPONGE KITTNER 30-101

## (undated) DEVICE — SU SILK 0 TIE 6X30" A306H

## (undated) DEVICE — CATH RETRIEVAL BALLOON EXTRACTOR PRO RX-S INJ ABOVE 9-12MM

## (undated) DEVICE — GLOVE PROTEXIS MICRO 7.5  2D73PM75

## (undated) DEVICE — SYR 50ML LL W/O NDL 309653

## (undated) DEVICE — VESSEL LOOPS BLUE SUPERMAXI 011022PBX

## (undated) DEVICE — DRAPE C-ARM W/STRAPS 42X72" 07-CA104

## (undated) DEVICE — PACK ENDOSCOPY GI CUSTOM UMMC

## (undated) DEVICE — KIT CONNECTOR FOR OLYMPUS ENDOSCOPES DEFENDO 100310

## (undated) DEVICE — PREP POVIDONE IODINE SCRUB 7.5% 4OZ APL82212

## (undated) DEVICE — KIT ENDO FIRST STEP DISINFECTANT 200ML W/POUCH EP-4

## (undated) DEVICE — SU MONOCRYL 4-0 PS-2 27" UND Y426H

## (undated) DEVICE — CLIP HORIZON MED BLUE 002200

## (undated) DEVICE — ESU HARMONIC ACE LAPAROSCOPIC SHEARS 5MMX23CM HAR23

## (undated) DEVICE — SU VICRYL 3-0 SH 27" UND J416H

## (undated) DEVICE — SOL NACL 0.9% IRRIG 1000ML BOTTLE 2F7124

## (undated) DEVICE — SUCTION MANIFOLD DORNOCH ULTRA CART UL-CL500

## (undated) DEVICE — LINEN TOWEL PACK X6 WHITE 5487

## (undated) DEVICE — WIRE GUIDE 0.025"X270CM STR VISIGLIDE G-240-2527S

## (undated) DEVICE — VESSEL LOOPS YELLOW MAXI 31145694

## (undated) DEVICE — SPONGE LAP 18X18" X8435

## (undated) DEVICE — CATH CHOLANGIOGRAM 7.5FR TAUT PLASTIC TIP 20018-010

## (undated) DEVICE — SU PROLENE 5-0 RB-1DA 36"  8556H

## (undated) RX ORDER — LIDOCAINE HYDROCHLORIDE 20 MG/ML
INJECTION, SOLUTION EPIDURAL; INFILTRATION; INTRACAUDAL; PERINEURAL
Status: DISPENSED
Start: 2019-03-13

## (undated) RX ORDER — FENTANYL CITRATE 50 UG/ML
INJECTION, SOLUTION INTRAMUSCULAR; INTRAVENOUS
Status: DISPENSED
Start: 2019-03-13

## (undated) RX ORDER — LIDOCAINE HYDROCHLORIDE 20 MG/ML
INJECTION, SOLUTION EPIDURAL; INFILTRATION; INTRACAUDAL; PERINEURAL
Status: DISPENSED
Start: 2019-02-05

## (undated) RX ORDER — PIPERACILLIN SODIUM, TAZOBACTAM SODIUM 3; .375 G/15ML; G/15ML
INJECTION, POWDER, LYOPHILIZED, FOR SOLUTION INTRAVENOUS
Status: DISPENSED
Start: 2019-03-13

## (undated) RX ORDER — DEXAMETHASONE SODIUM PHOSPHATE 4 MG/ML
INJECTION, SOLUTION INTRA-ARTICULAR; INTRALESIONAL; INTRAMUSCULAR; INTRAVENOUS; SOFT TISSUE
Status: DISPENSED
Start: 2019-03-13

## (undated) RX ORDER — DEXAMETHASONE SODIUM PHOSPHATE 4 MG/ML
INJECTION, SOLUTION INTRA-ARTICULAR; INTRALESIONAL; INTRAMUSCULAR; INTRAVENOUS; SOFT TISSUE
Status: DISPENSED
Start: 2019-02-05

## (undated) RX ORDER — LIDOCAINE HYDROCHLORIDE 10 MG/ML
INJECTION, SOLUTION EPIDURAL; INFILTRATION; INTRACAUDAL; PERINEURAL
Status: DISPENSED
Start: 2019-02-05

## (undated) RX ORDER — SODIUM CHLORIDE 9 MG/ML
INJECTION, SOLUTION INTRAVENOUS
Status: DISPENSED
Start: 2019-04-29

## (undated) RX ORDER — HYDROMORPHONE HYDROCHLORIDE 1 MG/ML
INJECTION, SOLUTION INTRAMUSCULAR; INTRAVENOUS; SUBCUTANEOUS
Status: DISPENSED
Start: 2019-03-13

## (undated) RX ORDER — FENTANYL CITRATE 50 UG/ML
INJECTION, SOLUTION INTRAMUSCULAR; INTRAVENOUS
Status: DISPENSED
Start: 2019-01-15

## (undated) RX ORDER — PROPOFOL 10 MG/ML
INJECTION, EMULSION INTRAVENOUS
Status: DISPENSED
Start: 2019-03-13

## (undated) RX ORDER — LIDOCAINE HYDROCHLORIDE 10 MG/ML
INJECTION, SOLUTION EPIDURAL; INFILTRATION; INTRACAUDAL; PERINEURAL
Status: DISPENSED
Start: 2019-01-16

## (undated) RX ORDER — FENTANYL CITRATE 50 UG/ML
INJECTION, SOLUTION INTRAMUSCULAR; INTRAVENOUS
Status: DISPENSED
Start: 2019-02-05

## (undated) RX ORDER — ONDANSETRON 2 MG/ML
INJECTION INTRAMUSCULAR; INTRAVENOUS
Status: DISPENSED
Start: 2019-02-05

## (undated) RX ORDER — GABAPENTIN 300 MG/1
CAPSULE ORAL
Status: DISPENSED
Start: 2019-01-16

## (undated) RX ORDER — INDOMETHACIN 50 MG/1
SUPPOSITORY RECTAL
Status: DISPENSED
Start: 2019-01-15

## (undated) RX ORDER — GLYCOPYRROLATE 0.2 MG/ML
INJECTION, SOLUTION INTRAMUSCULAR; INTRAVENOUS
Status: DISPENSED
Start: 2019-01-16

## (undated) RX ORDER — AMPICILLIN AND SULBACTAM 2; 1 G/1; G/1
INJECTION, POWDER, FOR SOLUTION INTRAMUSCULAR; INTRAVENOUS
Status: DISPENSED
Start: 2019-01-16

## (undated) RX ORDER — ONDANSETRON 2 MG/ML
INJECTION INTRAMUSCULAR; INTRAVENOUS
Status: DISPENSED
Start: 2019-01-15

## (undated) RX ORDER — PROPOFOL 10 MG/ML
INJECTION, EMULSION INTRAVENOUS
Status: DISPENSED
Start: 2019-02-05

## (undated) RX ORDER — ACETAMINOPHEN 325 MG/1
TABLET ORAL
Status: DISPENSED
Start: 2019-01-16

## (undated) RX ORDER — AMPICILLIN AND SULBACTAM 2; 1 G/1; G/1
INJECTION, POWDER, FOR SOLUTION INTRAMUSCULAR; INTRAVENOUS
Status: DISPENSED
Start: 2019-04-17

## (undated) RX ORDER — SODIUM CHLORIDE, SODIUM LACTATE, POTASSIUM CHLORIDE, CALCIUM CHLORIDE 600; 310; 30; 20 MG/100ML; MG/100ML; MG/100ML; MG/100ML
INJECTION, SOLUTION INTRAVENOUS
Status: DISPENSED
Start: 2019-03-13

## (undated) RX ORDER — FENTANYL CITRATE 50 UG/ML
INJECTION, SOLUTION INTRAMUSCULAR; INTRAVENOUS
Status: DISPENSED
Start: 2019-01-16

## (undated) RX ORDER — HYDROMORPHONE HYDROCHLORIDE 1 MG/ML
INJECTION, SOLUTION INTRAMUSCULAR; INTRAVENOUS; SUBCUTANEOUS
Status: DISPENSED
Start: 2019-02-05

## (undated) RX ORDER — CEFOXITIN 2 G/1
INJECTION, POWDER, FOR SOLUTION INTRAVENOUS
Status: DISPENSED
Start: 2019-03-13

## (undated) RX ORDER — ONDANSETRON 2 MG/ML
INJECTION INTRAMUSCULAR; INTRAVENOUS
Status: DISPENSED
Start: 2019-03-13

## (undated) RX ORDER — GLYCOPYRROLATE 0.2 MG/ML
INJECTION, SOLUTION INTRAMUSCULAR; INTRAVENOUS
Status: DISPENSED
Start: 2019-02-05

## (undated) RX ORDER — SODIUM CHLORIDE, SODIUM LACTATE, POTASSIUM CHLORIDE, CALCIUM CHLORIDE 600; 310; 30; 20 MG/100ML; MG/100ML; MG/100ML; MG/100ML
INJECTION, SOLUTION INTRAVENOUS
Status: DISPENSED
Start: 2019-01-15

## (undated) RX ORDER — LIDOCAINE HYDROCHLORIDE 10 MG/ML
INJECTION, SOLUTION EPIDURAL; INFILTRATION; INTRACAUDAL; PERINEURAL
Status: DISPENSED
Start: 2019-04-17

## (undated) RX ORDER — DEXAMETHASONE SODIUM PHOSPHATE 4 MG/ML
INJECTION, SOLUTION INTRA-ARTICULAR; INTRALESIONAL; INTRAMUSCULAR; INTRAVENOUS; SOFT TISSUE
Status: DISPENSED
Start: 2019-01-16

## (undated) RX ORDER — SODIUM CHLORIDE 9 MG/ML
INJECTION, SOLUTION INTRAVENOUS
Status: DISPENSED
Start: 2019-04-17

## (undated) RX ORDER — ONDANSETRON 2 MG/ML
INJECTION INTRAMUSCULAR; INTRAVENOUS
Status: DISPENSED
Start: 2019-01-16

## (undated) RX ORDER — GABAPENTIN 300 MG/1
CAPSULE ORAL
Status: DISPENSED
Start: 2019-03-13

## (undated) RX ORDER — PROPOFOL 10 MG/ML
INJECTION, EMULSION INTRAVENOUS
Status: DISPENSED
Start: 2019-01-16

## (undated) RX ORDER — AMPICILLIN AND SULBACTAM 2; 1 G/1; G/1
INJECTION, POWDER, FOR SOLUTION INTRAMUSCULAR; INTRAVENOUS
Status: DISPENSED
Start: 2019-04-29